# Patient Record
Sex: MALE | Race: BLACK OR AFRICAN AMERICAN | NOT HISPANIC OR LATINO | Employment: OTHER | URBAN - METROPOLITAN AREA
[De-identification: names, ages, dates, MRNs, and addresses within clinical notes are randomized per-mention and may not be internally consistent; named-entity substitution may affect disease eponyms.]

---

## 2017-01-17 ENCOUNTER — CLINICAL SUPPORT (OUTPATIENT)
Dept: CARDIOLOGY | Facility: CLINIC | Age: 47
End: 2017-01-17
Payer: COMMERCIAL

## 2017-01-17 ENCOUNTER — OFFICE VISIT (OUTPATIENT)
Dept: CARDIOLOGY | Facility: CLINIC | Age: 47
End: 2017-01-17
Payer: COMMERCIAL

## 2017-01-17 VITALS
SYSTOLIC BLOOD PRESSURE: 120 MMHG | BODY MASS INDEX: 32.6 KG/M2 | DIASTOLIC BLOOD PRESSURE: 82 MMHG | HEIGHT: 73 IN | WEIGHT: 246 LBS | HEART RATE: 71 BPM

## 2017-01-17 DIAGNOSIS — E78.5 OTHER AND UNSPECIFIED HYPERLIPIDEMIA: ICD-10-CM

## 2017-01-17 DIAGNOSIS — E78.2 MIXED HYPERLIPIDEMIA: ICD-10-CM

## 2017-01-17 DIAGNOSIS — R07.89 CHEST PAIN, ATYPICAL: Primary | ICD-10-CM

## 2017-01-17 DIAGNOSIS — Z82.49 FAMILY HISTORY OF HEART DISEASE IN MALE FAMILY MEMBER BEFORE AGE 55: ICD-10-CM

## 2017-01-17 PROCEDURE — 99204 OFFICE O/P NEW MOD 45 MIN: CPT | Mod: S$GLB,,, | Performed by: INTERNAL MEDICINE

## 2017-01-17 PROCEDURE — 93000 ELECTROCARDIOGRAM COMPLETE: CPT | Mod: S$GLB,,, | Performed by: INTERNAL MEDICINE

## 2017-01-17 PROCEDURE — 99999 PR PBB SHADOW E&M-EST. PATIENT-LVL III: CPT | Mod: PBBFAC,,, | Performed by: INTERNAL MEDICINE

## 2017-01-17 PROCEDURE — 1159F MED LIST DOCD IN RCRD: CPT | Mod: S$GLB,,, | Performed by: INTERNAL MEDICINE

## 2017-01-17 RX ORDER — GABAPENTIN 300 MG/1
300 CAPSULE ORAL 3 TIMES DAILY
COMMUNITY
End: 2021-11-05

## 2017-01-17 RX ORDER — NABUMETONE 500 MG/1
500 TABLET, FILM COATED ORAL 2 TIMES DAILY
COMMUNITY
End: 2017-10-02

## 2017-01-17 RX ORDER — ROSUVASTATIN CALCIUM 10 MG/1
10 TABLET, COATED ORAL DAILY
COMMUNITY

## 2017-01-17 NOTE — MR AVS SNAPSHOT
[facility] Cardiology  [address] [place] [address]  Phone: [phone]  Fax: [phone]                  Jhony Matute   2017 3:40 PM   Office Visit    Description:  Male : 1970   Provider:  Melvin Javier MD   Department:  [facility] - Cardiology           Reason for Visit     Hyperlipidemia           Diagnoses this Visit        Comments    Chest pain, atypical         Mixed hyperlipidemia         Family history of heart disease in male family member before age 55                To Do List           Future Appointments        Provider Department Dept Phone    2017 3:40 PM Melvin Javier MD [facility] Cardiology [phone]    2017 8:00 AM CARDIOVASCULAR, [facility] [phone]      Goals (5 Years of Data)     None      Follow-Up and Disposition     Return if symptoms worsen or fail to improve.    Follow-up and Disposition History      [facility]     [facility] Care [facility] - / Assistance  Registered nurses in the [facility] provide clinical advisement, health education, appointment booking, and other advisory services.  Call for this free service at [phone].             Medications           Message regarding Medications     Verify the changes and/or additions to your medication regime listed below are the same as discussed with your clinician today.  If any of these changes or additions are incorrect, please notify your healthcare provider.             Verify that the below list of medications is an accurate representation of the medications you are currently taking.  If none reported, the list may be blank. If incorrect, please contact your healthcare provider. Carry this list with you in case of emergency.           Current Medications     gabapentin (NEURONTIN) 300 MG capsule Take 300 mg by mouth 3 (three) times daily.    hydrocodone-acetaminophen  (LORTAB)  mg per tablet Take 1 tablet by mouth every 6 (six) hours as needed  "for Pain.    nabumetone (RELAFEN) 500 MG tablet Take 500 mg by mouth 2 (two) times daily.    rosuvastatin (CRESTOR) 10 MG tablet Take 10 mg by mouth once daily.           Clinical Reference Information           Vital Signs - Last Recorded  Most recent update: 1/17/2017  1:50 PM by Shirley Guajardo MA    BP Pulse Ht Wt BMI    120/82 (BP Location: Left arm) 71 6' 1" (1.854 m) 111.6 kg (246 lb) 32.46 kg/m2      Blood Pressure          Most Recent Value    BP  120/82      Allergies as of 1/17/2017     No Known Allergies      Immunizations Administered on Date of Encounter - 1/17/2017     None      Orders Placed During Today's Visit     Future Labs/Procedures Expected by Expires    Exercise stress echo with color flow  As directed 1/17/2018      [facility] Sign-Up     Activating your [facility] account is as easy as 1-2-3!     1) Visit [email or web address], select Sign Up Now, enter this activation code and your date of birth, then select Next.  [email or web address]  Expires: 3/3/2017  2:23 PM      2) Create a username and password to use when you visit [facility] in the future and select a security question in case you lose your password and select Next.    3) Enter your e-mail address and click Sign Up!    Additional Information  If you have questions, please e-mail [email or web address] or call [phone] to talk to our [facility] staff. Remember, [facility] is NOT to be used for urgent needs. For medical emergencies, dial 911.         "

## 2017-01-17 NOTE — PROGRESS NOTES
Subjective:   Patient ID:  Jhony Matute is a 46 y.o. male who presents for evaluation of Hyperlipidemia (hasn't taken in2 or 3 months)      HPI Comments: 47 yo, male, local [employer]. PMH HLD and atypical chest pain.  5 years ago, woke up at night due to sharp chest pain and dyspnea. Then went to [facility]. Sate in the hospital for few days and was told to f/[facility] cardiologist for ? Pericarditis. Pt did not f/u with cardiologist. For years, had recurrent less severe chest pain, lasted for few seconds and resolved after resting. Most time, occurred when doing nothing. Few times a years and no change recently. Last episode was 4 months ago.  No smoking/drinking.  Father had 1st heart attack at early 50's, mother had stroke at 80's. Sister had open heart surgery at 50's.  EKG NSR,  ms      Past Medical History   Diagnosis Date    Hypertension        History reviewed. No pertinent past surgical history.    Social History   Substance Use Topics    Smoking status: Never Smoker    Smokeless tobacco: None    Alcohol use No      Comment: socially       Family History   Problem Relation Age of Onset    Stroke Mother     Stroke Father     Heart attack Father        Review of Systems   Constitution: Negative for decreased appetite, diaphoresis, fever, weakness, malaise/fatigue and night sweats.   HENT: Negative for headaches and nosebleeds.    Eyes: Negative for blurred vision and double vision.   Cardiovascular: Negative for chest pain, claudication, dyspnea on exertion, irregular heartbeat, leg swelling, near-syncope, orthopnea, palpitations, paroxysmal nocturnal dyspnea and syncope.   Respiratory: Negative for cough, shortness of breath, sleep disturbances due to breathing, snoring, sputum production and wheezing.    Endocrine: Negative for cold intolerance and polyuria.   Hematologic/Lymphatic: Does not bruise/bleed easily.   Skin: Negative for rash.   Musculoskeletal: Negative for  back pain, falls, joint pain, joint swelling and neck pain.   Gastrointestinal: Negative for abdominal pain, heartburn, nausea and vomiting.   Genitourinary: Negative for dysuria, frequency and hematuria.   Neurological: Negative for difficulty with concentration, dizziness, focal weakness, light-headedness, numbness and seizures.   Psychiatric/Behavioral: Negative for depression, memory loss and substance abuse. The patient does not have insomnia.    Allergic/Immunologic: Negative for HIV exposure and hives.       Objective:   Physical Exam   Constitutional: He is oriented to person, place, and time. He appears well-nourished.   HENT:   Head: Normocephalic.   Eyes: Pupils are equal, round, and reactive to light.   Neck: Normal carotid pulses and no JVD present. Carotid bruit is not present. No thyromegaly present.   Cardiovascular: Normal rate, regular rhythm, normal heart sounds and normal pulses.   No extrasystoles are present. PMI is not displaced.  Exam reveals no gallop and no S3.    No murmur heard.  Pulmonary/Chest: Breath sounds normal. No stridor. No respiratory distress.   Abdominal: Soft. Bowel sounds are normal. There is no tenderness. There is no rebound.   Musculoskeletal: Normal range of motion.   Neurological: He is alert and oriented to person, place, and time.   Skin: Skin is intact. No rash noted.   Psychiatric: His behavior is normal.       No results found for: CHOL  No results found for: HDL  No results found for: LDLCALC  No results found for: TRIG  No results found for: CHOLHDL    Chemistry    No results found for: NA, K, CL, CO2, BUN, CREATININE, GLU No results found for: CALCIUM, ALKPHOS, AST, ALT, BILITOT       No results found for: TSH  No results found for: INR, PROTIME  No results found for: WBC, HGB, HCT, MCV, PLT  BNP  @LABRCNTIP(BNP,BNPTRIAGEBLO)@  CrCl cannot be calculated (Patient has no serum creatinine result on file.).     Assessment:      1. Chest pain, atypical    2. Mixed  hyperlipidemia    3. Family history of heart disease in male family member before age 55        Plan:   Exercise stress test ordered  Will call for the result.  If negative, f/u with PCP.

## 2017-10-02 ENCOUNTER — OFFICE VISIT (OUTPATIENT)
Dept: INTERNAL MEDICINE | Facility: CLINIC | Age: 47
End: 2017-10-02
Payer: COMMERCIAL

## 2017-10-02 ENCOUNTER — HOSPITAL ENCOUNTER (OUTPATIENT)
Dept: RADIOLOGY | Facility: HOSPITAL | Age: 47
Discharge: HOME | End: 2017-10-02
Attending: NURSE PRACTITIONER
Payer: COMMERCIAL

## 2017-10-02 VITALS
WEIGHT: 245.38 LBS | DIASTOLIC BLOOD PRESSURE: 78 MMHG | BODY MASS INDEX: 32.52 KG/M2 | TEMPERATURE: 97 F | HEART RATE: 81 BPM | SYSTOLIC BLOOD PRESSURE: 128 MMHG | HEIGHT: 73 IN

## 2017-10-02 DIAGNOSIS — M79.671 FOOT PAIN, RIGHT: Primary | ICD-10-CM

## 2017-10-02 DIAGNOSIS — M79.671 FOOT PAIN, RIGHT: ICD-10-CM

## 2017-10-02 DIAGNOSIS — M10.071 ACUTE IDIOPATHIC GOUT OF RIGHT FOOT: Primary | ICD-10-CM

## 2017-10-02 PROCEDURE — 99204 OFFICE O/P NEW MOD 45 MIN: CPT | Mod: 25,S$GLB,, | Performed by: NURSE PRACTITIONER

## 2017-10-02 PROCEDURE — 99999 PR PBB SHADOW E&M-EST. PATIENT-LVL III: CPT | Mod: PBBFAC,,, | Performed by: NURSE PRACTITIONER

## 2017-10-02 PROCEDURE — 73630 X-RAY EXAM OF FOOT: CPT | Mod: TC,PO,RT

## 2017-10-02 PROCEDURE — 73630 X-RAY EXAM OF FOOT: CPT | Mod: 26,RT,, | Performed by: RADIOLOGY

## 2017-10-02 PROCEDURE — 96372 THER/PROPH/DIAG INJ SC/IM: CPT | Mod: S$GLB,,, | Performed by: NURSE PRACTITIONER

## 2017-10-02 RX ORDER — COLCHICINE 0.6 MG/1
0.6 TABLET ORAL 2 TIMES DAILY
Qty: 10 TABLET | Refills: 0 | Status: SHIPPED | OUTPATIENT
Start: 2017-10-02 | End: 2018-01-15

## 2017-10-02 RX ORDER — NAPROXEN 500 MG/1
500 TABLET ORAL 2 TIMES DAILY WITH MEALS
Qty: 20 TABLET | Refills: 0 | Status: SHIPPED | OUTPATIENT
Start: 2017-10-02 | End: 2017-10-13

## 2017-10-02 RX ORDER — KETOROLAC TROMETHAMINE 30 MG/ML
60 INJECTION, SOLUTION INTRAMUSCULAR; INTRAVENOUS ONCE
Status: COMPLETED | OUTPATIENT
Start: 2017-10-02 | End: 2017-10-02

## 2017-10-02 RX ADMIN — KETOROLAC TROMETHAMINE 60 MG: 30 INJECTION, SOLUTION INTRAMUSCULAR; INTRAVENOUS at 02:10
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
## 2017-10-02 NOTE — PATIENT INSTRUCTIONS
Ice/warm compresses to affected area   Warm epsom salt soaks to affected area  Start naproxen tomorrow   Will call with xray results and uric acid level

## 2017-10-13 ENCOUNTER — OFFICE VISIT (OUTPATIENT)
Dept: INTERNAL MEDICINE | Facility: CLINIC | Age: 47
End: 2017-10-13
Payer: COMMERCIAL

## 2017-10-13 VITALS
HEIGHT: 73 IN | OXYGEN SATURATION: 96 % | WEIGHT: 244.25 LBS | BODY MASS INDEX: 32.37 KG/M2 | TEMPERATURE: 98 F | DIASTOLIC BLOOD PRESSURE: 76 MMHG | HEART RATE: 90 BPM | SYSTOLIC BLOOD PRESSURE: 122 MMHG

## 2017-10-13 DIAGNOSIS — M10.9 ACUTE GOUT OF RIGHT FOOT, UNSPECIFIED CAUSE: Primary | ICD-10-CM

## 2017-10-13 DIAGNOSIS — G47.26 SHIFT WORK SLEEP DISORDER: Chronic | ICD-10-CM

## 2017-10-13 DIAGNOSIS — Z11.4 SCREENING FOR HIV WITHOUT PRESENCE OF RISK FACTORS: ICD-10-CM

## 2017-10-13 DIAGNOSIS — E66.9 CLASS 1 OBESITY: Chronic | ICD-10-CM

## 2017-10-13 DIAGNOSIS — Z79.899 ENCOUNTER FOR LONG-TERM (CURRENT) USE OF MEDICATIONS: Chronic | ICD-10-CM

## 2017-10-13 DIAGNOSIS — E78.2 MIXED HYPERLIPIDEMIA: ICD-10-CM

## 2017-10-13 DIAGNOSIS — G47.10 HYPERSOMNIA: Chronic | ICD-10-CM

## 2017-10-13 DIAGNOSIS — R53.83 FATIGUE, UNSPECIFIED TYPE: ICD-10-CM

## 2017-10-13 PROBLEM — E66.811 CLASS 1 OBESITY: Chronic | Status: ACTIVE | Noted: 2017-10-13

## 2017-10-13 PROCEDURE — 99999 PR PBB SHADOW E&M-EST. PATIENT-LVL III: CPT | Mod: PBBFAC,,, | Performed by: FAMILY MEDICINE

## 2017-10-13 PROCEDURE — 99204 OFFICE O/P NEW MOD 45 MIN: CPT | Mod: S$GLB,,, | Performed by: FAMILY MEDICINE

## 2017-10-13 RX ORDER — DEXTROAMPHETAMINE SACCHARATE, AMPHETAMINE ASPARTATE, DEXTROAMPHETAMINE SULFATE AND AMPHETAMINE SULFATE 2.5; 2.5; 2.5; 2.5 MG/1; MG/1; MG/1; MG/1
10 TABLET ORAL 2 TIMES DAILY PRN
COMMUNITY
Start: 2017-09-16 | End: 2018-01-15

## 2017-10-13 RX ORDER — ALLOPURINOL 100 MG/1
100 TABLET ORAL DAILY
Qty: 30 TABLET | Refills: 1 | Status: SHIPPED | OUTPATIENT
Start: 2017-10-13 | End: 2018-01-15

## 2017-10-13 NOTE — ASSESSMENT & PLAN NOTE
STOP-BANG questionnaire to assess risk for obstructive sleep apnea (YANI)  · Snoring: Do you snore loudly? ANSWER: YES  · Tired: Do you often feel tired, fatigued, or sleepy during daytime? ANSWER: YES ([place] Sleepiness Scale score = 14.)  · Observed: Has anyone observed you stop breathing during your sleep? ANSWER: YES  · Pressure: Do you have or are you being treated for high blood pressure? ANSWER: NO  · BMI: BMI more than 35 kg/m2? ANSWER: NO (BMI = Body mass index is 32.23 kg/m².)   · Age: Age over 50 yr old? ANSWER: NO  · Neck circumference: Neck circumference greater than 40 cm? ANSWER: YES (Neck circumference = 43.5 cm, Mallampati class 3 oropharynx.  · Gender: Gender male? ANSWER: YES (Gender = male)    STOP-BANG Score = 6 (HIGH risk of YANI)    ADDITIONAL RELEVANT HISTORY: reports non-restorative sleep, reports waking up gasping/choking and reports involuntary weight-gain

## 2017-10-17 NOTE — PROGRESS NOTES
HEALTH MAINTENANCE REVIEW  Health Maintenance   Topic Date Due    TETANUS VACCINE  11/27/1988    Lipid Panel  08/19/2021    Influenza Vaccine  Completed        HEALTH MAINTENANCE INTERVENTIONS - DUE OR DUE SOON  Health Maintenance Due   Topic Date Due    TETANUS VACCINE  11/27/1988       FUTURE APPOINTMENTS  No future appointments.    CHIEF COMPLAINT  Foot Pain      HISTORY OF PRESENT ILLNESS  PROBLEM/CONDITION: No problem is acute inflammatory arthropathy of right midfoot, evaluated by urgent care, treated empirically with colchicine with greater than 90% improvement, evaluated with uric acid level elevated, and all history and symptoms consistent with acute gout, especially given his family history of his father having SEVERE gout. He reports no recent relevant trauma. We discussed risks and benefits of treatment options. In addition to appropriate therapeutic lifestyle changes, we are going to initiate allopurinol and recheck his uric acid level in about one month and titrate as clinically indicated.    PROBLEM/CONDITION: Appears to be tolerating statin for dyslipidemia without apparent myositis or hepatic dysfunction.    PROBLEM/CONDITION: MILD obesity noted with MILD to MODERATE involuntary weight gain over the last year. We discussed contributing factors.    PROBLEM/CONDITION: He reports chronic fatigue, hypersomnia, and difficulty focusing and maintaining attention. He has never been properly evaluated for attention deficit disorder, but his previous primary care physician treated empirically with Adderall to address his shiftwork sleep disorder. He reports fair but suboptimal results. We discussed differential diagnosis. He has multiple anatomic and historical risk factors for obstructive sleep apnea, as noted. He agreed to obtain tests as ordered in return soon thereafter to review test results and discuss treatment plan.    No other complaints or concerns reported.    Problem List Items Addressed  This Visit     Mixed hyperlipidemia    Relevant Orders    TSH    Comprehensive metabolic panel    Lipid panel    Acute gout of right foot - Primary    Relevant Medications    allopurinol (ZYLOPRIM) 100 MG tablet    Other Relevant Orders    Uric acid    Fatigue    Relevant Orders    Home Sleep Studies    TSH    CBC auto differential    Comprehensive metabolic panel    Encounter for long-term (current) use of medications (Chronic)    Shift work sleep disorder (Chronic)    Class 1 obesity (Chronic)    Relevant Orders    TSH    Hypersomnia (Chronic)    Current Assessment & Plan     STOP-BANG questionnaire to assess risk for obstructive sleep apnea (YANI)  · Snoring: Do you snore loudly? ANSWER: YES  · Tired: Do you often feel tired, fatigued, or sleepy during daytime? ANSWER: YES ([place] Sleepiness Scale score = 14.)  · Observed: Has anyone observed you stop breathing during your sleep? ANSWER: YES  · Pressure: Do you have or are you being treated for high blood pressure? ANSWER: NO  · BMI: BMI more than 35 kg/m2? ANSWER: NO (BMI = Body mass index is 32.23 kg/m².)   · Age: Age over 50 yr old? ANSWER: NO  · Neck circumference: Neck circumference greater than 40 cm? ANSWER: YES (Neck circumference = 43.5 cm, Mallampati class 3 oropharynx.  · Gender: Gender male? ANSWER: YES (Gender = male)    STOP-BANG Score = 6 (HIGH risk of YANI)    ADDITIONAL RELEVANT HISTORY: reports non-restorative sleep, reports waking up gasping/choking and reports involuntary weight-gain           Relevant Orders    Home Sleep Studies      Other Visit Diagnoses     Screening for HIV without presence of risk factors        Relevant Orders    HIV-1 and HIV-2 antibodies          REVIEW OF SYSTEMS  CONSTITUTIONAL: No fever reported.  CARDIOVASCULAR: No chest pain reported.  PULMONARY: No trouble breathing reported.     PHYSICAL EXAM  Vitals:    10/13/17 1549   BP: 122/76   BP Location: Right arm   Patient Position: Sitting   BP Method: Large  "(Manual)   Pulse: 90   Temp: 97.9 °F (36.6 °C)   TempSrc: Tympanic   SpO2: 96%   Weight: 110.8 kg (244 lb 4.3 oz)   Height: 6' 1" (1.854 m)   CONSTITUTIONAL: Vital signs noted. No apparent distress. Does not appear acutely ill or septic. Appears adequately hydrated.  HEENT: External ENT grossly unremarkable. Hearing grossly intact. Oropharynx moist.  PULM: Lungs clear. Breathing unlabored.  HEART: Auscultation reveals regular rate and rhythm without murmur, gallop or rub.  DERM: Skin warm and moist with normal turgor.  NEURO: There are no gross focal motor deficits or gross deficits of cranial nerves III-XII.  PSYCHIATRIC: Alert and oriented x 3. Mood is grossly neutral. Affect appropriate. Judgment and insight not grossly compromised.  MUSCULOSKELETAL: Grossly normal stance and gait. Feet are unremarkable to inspection, palpation, and ROM testing.    PAST MEDICAL HISTORY, FAMILY HISTORY, SOCIAL HISTORY, CURRENT MEDICATION LIST, and ALLERGY LIST reviewed by me (IQRA Gordon MD) and are updated consistent with the patient's report.    ASSESSMENT and PLAN  Acute gout of right foot, unspecified cause  -     allopurinol (ZYLOPRIM) 100 MG tablet; Take 1 tablet (100 mg total) by mouth once daily. - for gout prevention  Dispense: 30 tablet; Refill: 1  -     Uric acid; Future; Expected date: 11/03/2017    Encounter for long-term (current) use of medications    Shift work sleep disorder    Class 1 obesity  -     TSH; Future; Expected date: 11/03/2017    Fatigue, unspecified type  -     Home Sleep Studies; Future  -     TSH; Future; Expected date: 11/03/2017  -     CBC auto differential; Future; Expected date: 11/03/2017  -     Comprehensive metabolic panel; Future; Expected date: 11/03/2017    Hypersomnia  -     Home Sleep Studies; Future    Screening for HIV without presence of risk factors  -     HIV-1 and HIV-2 antibodies; Future; Expected date: 11/03/2017    Mixed hyperlipidemia  -     TSH; Future; Expected " "date: 11/03/2017  -     Comprehensive metabolic panel; Future; Expected date: 11/03/2017  -     Lipid panel; Future; Expected date: 11/03/2017        Medication List with Changes/Refills   New Medications    ALLOPURINOL (ZYLOPRIM) 100 MG TABLET    Take 1 tablet (100 mg total) by mouth once daily. - for gout prevention   Current Medications    COLCHICINE 0.6 MG TABLET    Take 1 tablet (0.6 mg total) by mouth 2 (two) times daily.    DEXTROAMPHETAMINE-AMPHETAMINE 10 MG TAB    Take 10 mg by mouth 2 (two) times daily as needed.    GABAPENTIN (NEURONTIN) 300 MG CAPSULE    Take 300 mg by mouth 3 (three) times daily.    ROSUVASTATIN (CRESTOR) 10 MG TABLET    Take 10 mg by mouth once daily.       Return in about 4 weeks (around 11/10/2017) for review test results and discuss treatment plan, re-evaluate problem(s) discussed today.    ABOUT THIS DOCUMENTATION:  · The order of the conditions listed in the HPI is one of convenience and does not necessarily reflect the chronology of the appointment, nor the relative importance of a condition. It is possible that additional description or status details about condition(s) may be found elsewhere in the documentation for today's encounter.  · Documentation entered by me for this encounter was done in part using speech-recognition technology. Although I have made an effort to ensure accuracy, "sound like" errors may exist and should be interpreted in context.                        -IQRA Gordon MD    There are no Patient Instructions on file for this visit.    "

## 2017-11-06 ENCOUNTER — TELEPHONE (OUTPATIENT)
Dept: PULMONOLOGY | Facility: HOSPITAL | Age: 47
End: 2017-11-06

## 2017-12-08 ENCOUNTER — OFFICE VISIT (OUTPATIENT)
Dept: SLEEP MEDICINE | Facility: CLINIC | Age: 47
End: 2017-12-08
Payer: COMMERCIAL

## 2017-12-08 DIAGNOSIS — G47.33 OSA (OBSTRUCTIVE SLEEP APNEA): ICD-10-CM

## 2017-12-08 DIAGNOSIS — G47.10 HYPERSOMNIA: Chronic | ICD-10-CM

## 2017-12-08 PROCEDURE — 99999 PR PBB SHADOW E&M-EST. PATIENT-LVL II: CPT | Mod: PBBFAC,,,

## 2017-12-08 PROCEDURE — 95806 SLEEP STUDY UNATT&RESP EFFT: CPT | Mod: S$GLB,,, | Performed by: INTERNAL MEDICINE

## 2017-12-08 PROCEDURE — 99499 UNLISTED E&M SERVICE: CPT | Mod: S$GLB,,, | Performed by: INTERNAL MEDICINE

## 2017-12-08 NOTE — Clinical Note
Assessment and Recommendations Device collected data for 7 hours 13 minutes. Excluded respiratory signal was 20 minutes. Lowest oxygen saturation was 82%. Average heart rate was 66 bpm with a maximal heart rate of 94 bpm. Snoring was recorded above 50 dB 100% of the time. Apnea hypopnea index: 15 events per hour. Total events 109. Obstructive sleep apnea-hypopnea syndrome detected. Treatment recommendations: CPAP would be the guideline recommendation for first-line treatment for obstructive sleep apnea. Either order in lab CPAP titration or AutoPAP device. Follow-up evaluation and treatment in the Ambulatory sleep disorder clinic. Other modalities for treatment of obstructive sleep apnea may be explored in patients with intolerant to CPAP including evaluation by ear nose and throat, mandibular advancement device, nonsupine sleep positioning device. Significant weight loss is recommended to normal ranges. Close follow-up to ensure resolution of symptoms.

## 2017-12-21 ENCOUNTER — TELEPHONE (OUTPATIENT)
Dept: PULMONOLOGY | Facility: CLINIC | Age: 47
End: 2017-12-21

## 2017-12-29 ENCOUNTER — TELEPHONE (OUTPATIENT)
Dept: PULMONOLOGY | Facility: CLINIC | Age: 47
End: 2017-12-29

## 2017-12-29 NOTE — TELEPHONE ENCOUNTER
Spoke to patient appointment offered for today and 12/ 30/17 patient states that he will keep scheduled appointment

## 2017-12-29 NOTE — TELEPHONE ENCOUNTER
----- Message from Samantha Del Toro sent at 12/29/2017  9:19 AM CST -----  Contact: Emma/wife  Emma called and stated the patient came in on 12/20/17 to see Mrs. Lejeune; he stated he arrived at 1:40 pm and sat there until 4:00 pm and no one ever called him to the back. He is trying to get in while he on vacation from work and would like to be worked in today. She can be contacted at [phone] or [phone] home.     Thanks,  Samantha

## 2018-01-15 ENCOUNTER — OFFICE VISIT (OUTPATIENT)
Dept: INTERNAL MEDICINE | Facility: CLINIC | Age: 48
End: 2018-01-15
Payer: COMMERCIAL

## 2018-01-15 ENCOUNTER — HOSPITAL ENCOUNTER (OUTPATIENT)
Dept: RADIOLOGY | Facility: HOSPITAL | Age: 48
Discharge: HOME | End: 2018-01-15
Attending: NURSE PRACTITIONER
Payer: COMMERCIAL

## 2018-01-15 ENCOUNTER — OFFICE VISIT (OUTPATIENT)
Dept: CARDIOLOGY | Facility: CLINIC | Age: 48
End: 2018-01-15
Payer: COMMERCIAL

## 2018-01-15 ENCOUNTER — TELEPHONE (OUTPATIENT)
Dept: CARDIOLOGY | Facility: CLINIC | Age: 48
End: 2018-01-15

## 2018-01-15 VITALS
HEART RATE: 84 BPM | DIASTOLIC BLOOD PRESSURE: 80 MMHG | HEIGHT: 73 IN | SYSTOLIC BLOOD PRESSURE: 132 MMHG | WEIGHT: 240 LBS | BODY MASS INDEX: 31.81 KG/M2

## 2018-01-15 VITALS
BODY MASS INDEX: 32.49 KG/M2 | HEIGHT: 73 IN | WEIGHT: 245.13 LBS | TEMPERATURE: 97 F | DIASTOLIC BLOOD PRESSURE: 80 MMHG | HEART RATE: 113 BPM | SYSTOLIC BLOOD PRESSURE: 130 MMHG

## 2018-01-15 DIAGNOSIS — M54.2 NECK PAIN: ICD-10-CM

## 2018-01-15 DIAGNOSIS — R07.89 ATYPICAL CHEST PAIN: Primary | ICD-10-CM

## 2018-01-15 DIAGNOSIS — Z82.49 FAMILY HISTORY OF HEART DISEASE IN MALE FAMILY MEMBER BEFORE AGE 55: ICD-10-CM

## 2018-01-15 DIAGNOSIS — M62.838 MUSCLE SPASMS OF NECK: Primary | ICD-10-CM

## 2018-01-15 DIAGNOSIS — E78.2 MIXED HYPERLIPIDEMIA: ICD-10-CM

## 2018-01-15 DIAGNOSIS — M62.838 MUSCLE SPASMS OF NECK: ICD-10-CM

## 2018-01-15 PROCEDURE — 72040 X-RAY EXAM NECK SPINE 2-3 VW: CPT | Mod: 26,,, | Performed by: RADIOLOGY

## 2018-01-15 PROCEDURE — 96372 THER/PROPH/DIAG INJ SC/IM: CPT | Mod: S$GLB,,, | Performed by: FAMILY MEDICINE

## 2018-01-15 PROCEDURE — 99999 PR PBB SHADOW E&M-EST. PATIENT-LVL III: CPT | Mod: PBBFAC,,, | Performed by: INTERNAL MEDICINE

## 2018-01-15 PROCEDURE — 99214 OFFICE O/P EST MOD 30 MIN: CPT | Mod: S$GLB,,, | Performed by: INTERNAL MEDICINE

## 2018-01-15 PROCEDURE — 93000 ELECTROCARDIOGRAM COMPLETE: CPT | Mod: S$GLB,,, | Performed by: NUCLEAR MEDICINE

## 2018-01-15 PROCEDURE — 99999 PR PBB SHADOW E&M-EST. PATIENT-LVL IV: CPT | Mod: PBBFAC,,, | Performed by: NURSE PRACTITIONER

## 2018-01-15 PROCEDURE — 72040 X-RAY EXAM NECK SPINE 2-3 VW: CPT | Mod: TC,FY,PO

## 2018-01-15 PROCEDURE — 99214 OFFICE O/P EST MOD 30 MIN: CPT | Mod: 25,S$GLB,, | Performed by: NURSE PRACTITIONER

## 2018-01-15 RX ORDER — CYCLOBENZAPRINE HCL 10 MG
10 TABLET ORAL NIGHTLY PRN
Qty: 14 TABLET | Refills: 0 | Status: SHIPPED | OUTPATIENT
Start: 2018-01-15 | End: 2018-01-25

## 2018-01-15 RX ORDER — NABUMETONE 500 MG/1
500 TABLET, FILM COATED ORAL 2 TIMES DAILY
COMMUNITY
End: 2022-04-18

## 2018-01-15 RX ORDER — METHYLPREDNISOLONE ACETATE 40 MG/ML
60 INJECTION, SUSPENSION INTRA-ARTICULAR; INTRALESIONAL; INTRAMUSCULAR; SOFT TISSUE
Status: COMPLETED | OUTPATIENT
Start: 2018-01-15 | End: 2018-01-15

## 2018-01-15 RX ADMIN — METHYLPREDNISOLONE ACETATE 60 MG: 40 INJECTION, SUSPENSION INTRA-ARTICULAR; INTRALESIONAL; INTRAMUSCULAR; SOFT TISSUE at 03:01

## 2018-01-15 NOTE — PROGRESS NOTES
Subjective:       Patient ID: Jhony Matute is a 47 y.o. male.    Chief Complaint: Back Pain (upper back) and Neck Pain    Neck pain x 1 week- believes he has slept wrong. He reports stiffness and limited ROM. Left side of neck is mostly affected. Radiates across shoulders and between shoulder blades. There is also intermittent moderate headache. No fever, blurred vision, dizziness, n/v. Pt was just seen by cardiology due to cp due to rotation of neck. Cardiac workup underway.       Review of Systems   Constitutional: Negative for chills, fatigue and fever.   HENT: Negative for congestion, ear pain, postnasal drip, rhinorrhea, sinus pressure, sneezing and sore throat.    Eyes: Negative for photophobia, pain and visual disturbance.   Respiratory: Negative for cough, chest tightness and shortness of breath.    Cardiovascular: Negative for chest pain, palpitations and leg swelling.   Gastrointestinal: Negative for abdominal pain, constipation, diarrhea, nausea and vomiting.   Genitourinary: Negative for dysuria and frequency.   Musculoskeletal: Positive for neck pain and neck stiffness. Negative for arthralgias.   Neurological: Negative for dizziness, light-headedness and headaches.   Psychiatric/Behavioral: Negative for sleep disturbance.       Objective:      Physical Exam   Constitutional: He is oriented to person, place, and time. He appears well-developed and well-nourished.   HENT:   Head: Normocephalic and atraumatic.   Right Ear: Tympanic membrane normal.   Left Ear: Tympanic membrane normal.   Eyes: Conjunctivae and EOM are normal. Pupils are equal, round, and reactive to light.   Neck: Neck supple. Muscular tenderness present. No spinous process tenderness present. No neck rigidity. Decreased range of motion present. No edema and no erythema present.   Cardiovascular: Normal rate, regular rhythm and normal heart sounds.    Pulmonary/Chest: Effort normal and breath sounds normal.   Abdominal: Soft. Bowel  sounds are normal.   Neurological: He is alert and oriented to person, place, and time.   Skin: Skin is warm and dry.   Psychiatric: He has a normal mood and affect.       Assessment:       1. Muscle spasms of neck        Plan:   Muscle spasms of neck  -     X-Ray Cervical Spine AP And Lateral; Future; Expected date: 01/15/2018  -     cyclobenzaprine (FLEXERIL) 10 MG tablet; Take 1 tablet (10 mg total) by mouth nightly as needed for Muscle spasms.  Dispense: 14 tablet; Refill: 0  -     methylPREDNISolone acetate injection 60 mg; Inject 1.5 mLs (60 mg total) into the muscle one time.      As above   Continue hot soaks/hot showers  Muscle rub/ neck and shoulder stretches  Steroid shot today. Restart Relafen tomorrow only if pain persists.  Muscle relaxant in evening as needed. Caution for sedation as discussed.  Will call with neck xray result.

## 2018-01-15 NOTE — PATIENT INSTRUCTIONS
Continue hot soaks/hot showers  Muscle rub/ neck and shoulder stretches  Steroid shot today. Restart Relafen tomorrow only if pain persists.  Muscle relaxant in evening as needed. Caution for sedation as discussed.  Will call with neck xray result.

## 2018-01-15 NOTE — PROGRESS NOTES
"Subjective:    Patient ID:  Jhony Matute is a 47 y.o. male who presents for evaluation of Chest Pain and Shortness of Breath      HPI  Pt presents for f/u.  This is my first clinic visit with pt.  He saw Dr. Javier one year ago for atypical cp after hospital eval at [facility], possible pericarditis.  He has family h/o CAD (father MI, mother stroke, sister CABG).  Stress echo was ordered by Dr. Javier a year ago but pt cancelled his test.   Now here.   States he has pain in back and chest over 1.5 week  Worse if he relaxes. Mineral bath helped.  Worse with positional movements.  If he moves a certain way, sxs get worse.   No typical anginal sxs.  No cp with exertion.    Some dyspnea, on chronic basis, some with exertion.  ecg today is normal other than possible LAE.   Had some cold and/or flu 2 weeks ago at work, no seems back to normal on this end.      Review of Systems   Constitution: Positive for malaise/fatigue.   HENT: Negative.    Eyes: Negative.    Cardiovascular: Positive for chest pain and dyspnea on exertion.   Respiratory: Positive for shortness of breath.    Endocrine: Negative.    Hematologic/Lymphatic: Negative.    Skin: Negative.    Musculoskeletal: Positive for arthritis, back pain and neck pain.   Gastrointestinal: Negative.    Genitourinary: Negative.    Neurological: Negative.    Psychiatric/Behavioral: Negative.    Allergic/Immunologic: Negative.        /80 (BP Location: Left arm)   Pulse 84   Ht 6' 1" (1.854 m)   Wt 108.9 kg (240 lb)   BMI 31.66 kg/m²     Wt Readings from Last 3 Encounters:   01/15/18 108.9 kg (240 lb)   10/13/17 110.8 kg (244 lb 4.3 oz)   10/02/17 111.3 kg (245 lb 6 oz)     Temp Readings from Last 3 Encounters:   10/13/17 97.9 °F (36.6 °C) (Tympanic)   10/02/17 96.8 °F (36 °C) (Tympanic)     BP Readings from Last 3 Encounters:   01/15/18 132/80   10/13/17 122/76   10/02/17 128/78     Pulse Readings from Last 3 Encounters:   01/15/18 84   10/13/17 90   10/02/17 81 "          Objective:    Physical Exam   Constitutional: He is oriented to person, place, and time. He appears well-developed and well-nourished.   HENT:   Head: Normocephalic.   Neck: Normal range of motion. Neck supple. Normal carotid pulses, no hepatojugular reflux and no JVD present. Carotid bruit is not present. No thyromegaly present.   Cardiovascular: Normal rate, regular rhythm, S1 normal and S2 normal.  PMI is not displaced.  Exam reveals no S3, no S4, no distant heart sounds, no friction rub, no midsystolic click and no opening snap.    No murmur heard.  Pulses:       Radial pulses are 2+ on the right side, and 2+ on the left side.   Reproducible cp with rotation of neck   Pulmonary/Chest: Effort normal and breath sounds normal. He has no wheezes. He has no rales.   Abdominal: Soft. Bowel sounds are normal. He exhibits no distension, no abdominal bruit, no ascites and no mass. There is no tenderness.   Musculoskeletal: He exhibits no edema.   Neurological: He is alert and oriented to person, place, and time.   Skin: Skin is warm.   Psychiatric: He has a normal mood and affect. His behavior is normal.   Nursing note and vitals reviewed.      I have reviewed all pertinent labs and cardiac studies.      Chemistry    No results found for: NA, K, CL, CO2, BUN, CREATININE, GLU No results found for: CALCIUM, ALKPHOS, AST, ALT, BILITOT, ESTGFRAFRICA, EGFRNONAA     No results found for: WBC, HGB, HCT, MCV, PLT  No results found for: LABA1C, HGBA1C  No results found for: CHOL  No results found for: HDL  No results found for: LDLCALC  No results found for: TRIG  No results found for: CHOLHDL        Assessment:       1. Atypical chest pain    2. Mixed hyperlipidemia    3. Family history of heart disease in male family member before age 55    4. Neck pain         Plan:             - Atypical cp sxs, not typical for cardiac etiology at all but seems MSK or cervical spine related sxs.  - ECG is normal other than possible  LAE.  - At risk for CAD, do advise stress test due to risk factors and h/o atypical cp and this was ordered a year ago but pt never did test.  Will reschedule.  - Echo  - CXR/c spine xray  - Advise pt to go to Urgent Care or PCP today for further eval of neck discomfort.   - Check lipids-- adjust Crestor dose if needed.    Phone review for test results.

## 2018-01-16 ENCOUNTER — PATIENT MESSAGE (OUTPATIENT)
Dept: CARDIOLOGY | Facility: CLINIC | Age: 48
End: 2018-01-16

## 2018-01-16 NOTE — TELEPHONE ENCOUNTER
Please call pt  Cholesterol 197 and triglycerides 259  Cholesterol could be better.  Increase Crestor to 20 mg nightly  Scheduled repeat CMP, FLP 2 months after starting new dose.    Dr Cooper

## 2018-01-18 ENCOUNTER — TELEPHONE (OUTPATIENT)
Dept: CARDIOLOGY | Facility: CLINIC | Age: 48
End: 2018-01-18

## 2018-01-19 NOTE — TELEPHONE ENCOUNTER
We need to call patient about his results, not forward my email about results.  Also need to have labs scheduled as recommended by telephone call.    Thanks    Dr Cooper

## 2018-01-19 NOTE — TELEPHONE ENCOUNTER
Patient was emailed due to unable to reach by phone at that time. It was to inform him until he could be reached by phone or return our call.

## 2018-01-22 ENCOUNTER — TELEPHONE (OUTPATIENT)
Dept: CARDIOLOGY | Facility: HOSPITAL | Age: 48
End: 2018-01-22

## 2018-01-23 NOTE — TELEPHONE ENCOUNTER
Did nursing staff contact pt by phone call and schedule labs and give my recommendations?    Dr Cooper

## 2019-08-23 ENCOUNTER — TELEPHONE (OUTPATIENT)
Dept: ORTHOPEDICS | Facility: CLINIC | Age: 49
End: 2019-08-23

## 2019-08-23 DIAGNOSIS — M51.36 DDD (DEGENERATIVE DISC DISEASE), LUMBAR: Primary | ICD-10-CM

## 2019-08-28 ENCOUNTER — OFFICE VISIT (OUTPATIENT)
Dept: ORTHOPEDICS | Facility: CLINIC | Age: 49
End: 2019-08-28
Payer: COMMERCIAL

## 2019-08-28 ENCOUNTER — HOSPITAL ENCOUNTER (OUTPATIENT)
Dept: RADIOLOGY | Facility: HOSPITAL | Age: 49
Discharge: HOME | End: 2019-08-28
Attending: PHYSICIAN ASSISTANT
Payer: COMMERCIAL

## 2019-08-28 VITALS — HEIGHT: 73 IN | WEIGHT: 241.88 LBS | BODY MASS INDEX: 32.06 KG/M2

## 2019-08-28 DIAGNOSIS — M51.36 DDD (DEGENERATIVE DISC DISEASE), LUMBAR: ICD-10-CM

## 2019-08-28 DIAGNOSIS — M54.16 LUMBAR RADICULOPATHY: Primary | ICD-10-CM

## 2019-08-28 PROCEDURE — 99204 OFFICE O/P NEW MOD 45 MIN: CPT | Mod: S$GLB,,, | Performed by: PHYSICIAN ASSISTANT

## 2019-08-28 PROCEDURE — 72120 X-RAY BEND ONLY L-S SPINE: CPT | Mod: 26,,, | Performed by: RADIOLOGY

## 2019-08-28 PROCEDURE — 99204 PR OFFICE/OUTPT VISIT, NEW, LEVL IV, 45-59 MIN: ICD-10-PCS | Mod: S$GLB,,, | Performed by: PHYSICIAN ASSISTANT

## 2019-08-28 PROCEDURE — 72100 X-RAY EXAM L-S SPINE 2/3 VWS: CPT | Mod: TC

## 2019-08-28 PROCEDURE — 3008F PR BODY MASS INDEX (BMI) DOCUMENTED: ICD-10-PCS | Mod: CPTII,S$GLB,, | Performed by: PHYSICIAN ASSISTANT

## 2019-08-28 PROCEDURE — 99999 PR PBB SHADOW E&M-EST. PATIENT-LVL III: ICD-10-PCS | Mod: PBBFAC,,, | Performed by: PHYSICIAN ASSISTANT

## 2019-08-28 PROCEDURE — 72120 XR LUMBAR SPINE AP AND LAT WITH FLEX/EXT: ICD-10-PCS | Mod: 26,,, | Performed by: RADIOLOGY

## 2019-08-28 PROCEDURE — 72100 X-RAY EXAM L-S SPINE 2/3 VWS: CPT | Mod: 26,,, | Performed by: RADIOLOGY

## 2019-08-28 PROCEDURE — 99999 PR PBB SHADOW E&M-EST. PATIENT-LVL III: CPT | Mod: PBBFAC,,, | Performed by: PHYSICIAN ASSISTANT

## 2019-08-28 PROCEDURE — 3008F BODY MASS INDEX DOCD: CPT | Mod: CPTII,S$GLB,, | Performed by: PHYSICIAN ASSISTANT

## 2019-08-28 PROCEDURE — 72100 XR LUMBAR SPINE AP AND LAT WITH FLEX/EXT: ICD-10-PCS | Mod: 26,,, | Performed by: RADIOLOGY

## 2019-08-28 RX ORDER — OMEGA-3-ACID ETHYL ESTERS 1 G/1
CAPSULE, LIQUID FILLED ORAL
COMMUNITY
Start: 2019-07-23

## 2019-08-28 RX ORDER — HYDROCODONE BITARTRATE AND ACETAMINOPHEN 10; 325 MG/1; MG/1
TABLET ORAL
COMMUNITY
Start: 2019-08-16

## 2019-08-28 RX ORDER — ONDANSETRON 8 MG/1
TABLET, ORALLY DISINTEGRATING ORAL
COMMUNITY
Start: 2019-08-21 | End: 2021-11-05

## 2019-08-28 RX ORDER — CYCLOBENZAPRINE HCL 10 MG
TABLET ORAL
COMMUNITY
Start: 2019-08-19 | End: 2023-03-08 | Stop reason: SDUPTHER

## 2019-08-28 RX ORDER — DEXTROAMPHETAMINE SACCHARATE, AMPHETAMINE ASPARTATE, DEXTROAMPHETAMINE SULFATE AND AMPHETAMINE SULFATE 3.75; 3.75; 3.75; 3.75 MG/1; MG/1; MG/1; MG/1
TABLET ORAL
COMMUNITY
Start: 2019-07-25

## 2019-08-28 NOTE — PROGRESS NOTES
DATE: 8/28/2019  PATIENT: Jhony Matute    Supervising Physician: Franklin Adkins M.D.    CHIEF COMPLAINT: back and right leg pain    HISTORY:  Jhony Matute is a 48 y.o. male here for initial evaluation of low back and right>left leg pain (Back - 7, Leg - 7).  The pain in the back is what bothers him most.  The pain has been present for 4-5 years but has progressively worsened over the last 2 months.  The pain is usually in the right leg but started in the left leg too about 2 months ago.  The patient describes the pain as sharp.  The pain is worse with standing, sitting, squatting and kneeling and improved by lying flat with his feet elevated. There is associated numbness and tingling in the right thigh. There is subjective weakness, particularly with going upstairs. Prior treatments have included gabapentin, norco, flexeril, relafen, physical therapy 3-4 years ago and 3 ESIs over the last 4 years, but no surgery.  His most recent [place] was 7/1/2019 with Dr. allred in [place].  He says it did not give him any relief, no even for a day.     The patient denies myelopathic symptoms such as handwriting changes or difficulty with buttons/coins/keys. Denies perineal paresthesias, bowel/bladder dysfunction.    PAST MEDICAL/SURGICAL HISTORY:  Past Medical History:   Diagnosis Date    Hypertension      History reviewed. No pertinent surgical history.    Medications:   Current Outpatient Medications on File Prior to Visit   Medication Sig Dispense Refill    cyclobenzaprine (FLEXERIL) 10 MG tablet       dextroamphetamine-amphetamine (ADDERALL) 15 mg tablet       gabapentin (NEURONTIN) 300 MG capsule Take 300 mg by mouth 3 (three) times daily.      HYDROcodone-acetaminophen (NORCO)  mg per tablet       nabumetone (RELAFEN) 500 MG tablet Take 500 mg by mouth 2 (two) times daily.      omega-3 acid ethyl esters (LOVAZA) 1 gram capsule       rosuvastatin (CRESTOR) 10 MG tablet Take 10 mg by mouth once daily.       ondansetron (ZOFRAN-ODT) 8 MG TbDL        No current facility-administered medications on file prior to visit.        Social History:   Social History     Socioeconomic History    Marital status:      Spouse name: Not on file    Number of children: Not on file    Years of education: Not on file    Highest education level: Not on file   Occupational History    Not on file   Social Needs    Financial resource strain: Not on file    Food insecurity:     Worry: Not on file     Inability: Not on file    Transportation needs:     Medical: Not on file     Non-medical: Not on file   Tobacco Use    Smoking status: Never Smoker    Smokeless tobacco: Never Used   Substance and Sexual Activity    Alcohol use: No     Comment: socially    Drug use: No    Sexual activity: Yes     Partners: Female   Lifestyle    Physical activity:     Days per week: Not on file     Minutes per session: Not on file    Stress: Not on file   Relationships    Social connections:     Talks on phone: Not on file     Gets together: Not on file     Attends [employer] service: Not on file     Active member of club or organization: Not on file     Attends meetings of clubs or organizations: Not on file     Relationship status: Not on file   Other Topics Concern    Not on file   Social History Narrative    Not on file       REVIEW OF SYSTEMS:  Constitution: Negative. Negative for chills, fever and night sweats.   Cardiovascular: Negative for chest pain and syncope.   Respiratory: Negative for cough and shortness of breath.   Gastrointestinal: See HPI. Negative for nausea/vomiting. Negative for abdominal pain.  Genitourinary: See HPI. Negative for discoloration or dysuria.  Skin: Negative for dry skin, itching and rash.   Hematologic/Lymphatic: Negative for bleeding problem. Does not bruise/bleed easily.   Musculoskeletal: Negative for falls and muscle weakness.   Neurological: See HPI. No seizures.   Endocrine: Negative for  "polydipsia, polyphagia and polyuria.   Allergic/Immunologic: Negative for hives and persistent infections.     EXAM:  Ht 6' 1" (1.854 m)   Wt 109.7 kg (241 lb 13.5 oz)   BMI 31.91 kg/m²     General: The patient is a very pleasant 48 y.o. male in no apparent distress, the patient is oriented to person, place and time.  Psych: Normal mood and affect  HEENT: Vision grossly intact, hearing intact to the spoken word.  Lungs: Respirations unlabored.  Gait: Normal station and gait, no difficulty with toe or heel walk.   Skin: Dorsal lumbar skin negative for rashes, lesions, hairy patches and surgical scars. There is mild lumbar tenderness to palpation.  Range of motion: Lumbar range of motion is acceptable.  Spinal Balance: Global saggital and coronal spinal balance acceptable, not significant for scoliosis and kyphosis.  Musculoskeletal: No pain with the range of motion of the bilateral hips. No trochanteric tenderness to palpation.  Vascular: Bilateral lower extremities warm and well perfused, dorsalis pedis pulses 2+ bilaterally.  Neurological: Normal strength and tone in all major motor groups in the bilateral lower extremities. Normal sensation to light touch in the L2-S1 dermatomes bilaterally.  Deep tendon reflexes symmetric 2+ in the bilateral lower extremities.  Negative Babinski bilaterally. Straight leg raise positive bilaterally, reproduces back pain.  straight leg raise on the right reproduces leg pain as well.     IMAGING:      Today I personally reviewed AP, Lat and Flex/Ex  upright L-spine films that demonstrate normal disc spacing and alignment.  Mild degenerative changes.         Body mass index is 31.91 kg/m².    No results found for: HGBA1C        ASSESSMENT/PLAN:    Jhony was seen today for low-back pain and leg pain.    Diagnoses and all orders for this visit:    Lumbar radiculopathy        The patient is having low back and R>L lower extremity radiculopathy that has been present for several " years.    Today we discussed at length all of the different treatment options including anti-inflammatories, acetaminophen, rest, ice, heat, physical therapy including strengthening and stretching exercises, home exercises, ROM, aerobic conditioning, aqua therapy, other modalities including ultrasound, massage, and dry needling, epidural steroid injections and finally surgical intervention.      The patient has failed conservative treatment including medications, physical therapy and injections.  He recently had an MRI at an outside facility but does not have the disc with him.  He will have this put on a disc and drop it off for me to review.  I will also have him fill out a release of information today so we can get his records from Dr. Penaloza.  I will call once I receive his records and review his imaging.       Follow up if symptoms worsen or fail to improve.

## 2019-09-09 DIAGNOSIS — M54.16 LUMBAR RADICULOPATHY: Primary | ICD-10-CM

## 2019-09-12 ENCOUNTER — TELEPHONE (OUTPATIENT)
Dept: NEUROLOGY | Facility: CLINIC | Age: 49
End: 2019-09-12

## 2019-09-12 NOTE — TELEPHONE ENCOUNTER
Returned call no answer no vm.      ----- Message from Jacqueline French sent at 9/12/2019  8:59 AM CDT -----  Contact: Emma (wife)[phone]  Type: Patient Call Back    Who called:Emma    What is the request in detail: Emma, wife of the patient, is requesting a call back from the staff. She would like to reschedule the patient's EMG appointment    Can the clinic reply by [facility]?no    Would the patient rather a call back or a response via [facility]? Call back    Best call back number:[phone]

## 2019-09-18 ENCOUNTER — PROCEDURE VISIT (OUTPATIENT)
Dept: NEUROLOGY | Facility: CLINIC | Age: 49
End: 2019-09-18
Payer: COMMERCIAL

## 2019-09-18 VITALS — WEIGHT: 241 LBS | HEIGHT: 73 IN | BODY MASS INDEX: 31.94 KG/M2

## 2019-09-18 DIAGNOSIS — M54.16 LUMBAR RADICULOPATHY: ICD-10-CM

## 2019-09-18 PROCEDURE — 95909 NRV CNDJ TST 5-6 STUDIES: CPT | Mod: S$GLB,,, | Performed by: PSYCHIATRY & NEUROLOGY

## 2019-09-18 PROCEDURE — 95886 MUSC TEST DONE W/N TEST COMP: CPT | Mod: S$GLB,,, | Performed by: PSYCHIATRY & NEUROLOGY

## 2019-09-18 PROCEDURE — 95886 PR EMG COMPLETE, W/ NERVE CONDUCTION STUDIES, 5+ MUSCLES: ICD-10-PCS | Mod: S$GLB,,, | Performed by: PSYCHIATRY & NEUROLOGY

## 2019-09-18 PROCEDURE — 95909 PR NERVE CONDUCTION STUDY; 5-6 STUDIES: ICD-10-PCS | Mod: S$GLB,,, | Performed by: PSYCHIATRY & NEUROLOGY

## 2019-09-22 NOTE — PROCEDURES
Procedures     Patient was referred for EMG/NCS. EMG/NCS was performed. Please see scanned EMG/NCS report for further details.    Patient was advised to follow up with referring physician for further management.    Namita Florence

## 2019-09-25 ENCOUNTER — PATIENT OUTREACH (OUTPATIENT)
Dept: ADMINISTRATIVE | Facility: OTHER | Age: 49
End: 2019-09-25

## 2019-09-25 ENCOUNTER — TELEPHONE (OUTPATIENT)
Dept: ORTHOPEDICS | Facility: CLINIC | Age: 49
End: 2019-09-25

## 2019-09-25 RX ORDER — HYDROCODONE BITARTRATE AND ACETAMINOPHEN 10; 325 MG/1; MG/1
TABLET ORAL
Status: CANCELLED | OUTPATIENT
Start: 2019-09-25

## 2019-10-14 ENCOUNTER — TELEPHONE (OUTPATIENT)
Dept: PAIN MEDICINE | Facility: CLINIC | Age: 49
End: 2019-10-14

## 2019-10-14 DIAGNOSIS — M54.16 LUMBAR RADICULOPATHY: Primary | ICD-10-CM

## 2019-11-15 ENCOUNTER — HOSPITAL ENCOUNTER (OUTPATIENT)
Facility: OTHER | Age: 49
Discharge: HOME | End: 2019-11-15
Attending: ANESTHESIOLOGY | Admitting: ANESTHESIOLOGY
Payer: COMMERCIAL

## 2019-11-15 VITALS
DIASTOLIC BLOOD PRESSURE: 91 MMHG | SYSTOLIC BLOOD PRESSURE: 158 MMHG | WEIGHT: 245 LBS | HEART RATE: 71 BPM | TEMPERATURE: 98 F | OXYGEN SATURATION: 96 % | BODY MASS INDEX: 32.47 KG/M2 | HEIGHT: 73 IN | RESPIRATION RATE: 18 BRPM

## 2019-11-15 DIAGNOSIS — M54.16 LUMBAR RADICULOPATHY: Primary | ICD-10-CM

## 2019-11-15 DIAGNOSIS — G89.29 CHRONIC PAIN: ICD-10-CM

## 2019-11-15 PROCEDURE — 63600175 PHARM REV CODE 636 W HCPCS: Performed by: ANESTHESIOLOGY

## 2019-11-15 PROCEDURE — 25000003 PHARM REV CODE 250: Performed by: ANESTHESIOLOGY

## 2019-11-15 PROCEDURE — 25500020 PHARM REV CODE 255: Performed by: ANESTHESIOLOGY

## 2019-11-15 PROCEDURE — 64484 NJX AA&/STRD TFRM EPI L/S EA: CPT | Mod: RT,,, | Performed by: ANESTHESIOLOGY

## 2019-11-15 PROCEDURE — 99152 PR MOD CONSCIOUS SEDATION, SAME PHYS, 5+ YRS, FIRST 15 MIN: ICD-10-PCS | Mod: ,,, | Performed by: ANESTHESIOLOGY

## 2019-11-15 PROCEDURE — 64483 PR EPIDURAL INJ, ANES/STEROID, TRANSFORAMINAL, LUMB/SACR, SNGL LEVL: ICD-10-PCS | Mod: RT,,, | Performed by: ANESTHESIOLOGY

## 2019-11-15 PROCEDURE — 64484 NJX AA&/STRD TFRM EPI L/S EA: CPT | Performed by: ANESTHESIOLOGY

## 2019-11-15 PROCEDURE — 99152 MOD SED SAME PHYS/QHP 5/>YRS: CPT | Mod: ,,, | Performed by: ANESTHESIOLOGY

## 2019-11-15 PROCEDURE — 64483 NJX AA&/STRD TFRM EPI L/S 1: CPT | Mod: RT,,, | Performed by: ANESTHESIOLOGY

## 2019-11-15 PROCEDURE — 64484 PRA INJECT ANES/STEROID FORAMEN LUMBAR/SACRAL W IMG GUIDE ,EA ADD LEVEL: ICD-10-PCS | Mod: RT,,, | Performed by: ANESTHESIOLOGY

## 2019-11-15 PROCEDURE — 64483 NJX AA&/STRD TFRM EPI L/S 1: CPT | Performed by: ANESTHESIOLOGY

## 2019-11-15 RX ORDER — FENTANYL CITRATE 50 UG/ML
INJECTION, SOLUTION INTRAMUSCULAR; INTRAVENOUS
Status: DISCONTINUED | OUTPATIENT
Start: 2019-11-15 | End: 2019-11-15 | Stop reason: HOSPADM

## 2019-11-15 RX ORDER — MIDAZOLAM HYDROCHLORIDE 1 MG/ML
INJECTION INTRAMUSCULAR; INTRAVENOUS
Status: DISCONTINUED | OUTPATIENT
Start: 2019-11-15 | End: 2019-11-15 | Stop reason: HOSPADM

## 2019-11-15 RX ORDER — LIDOCAINE HYDROCHLORIDE 5 MG/ML
INJECTION, SOLUTION INFILTRATION; INTRAVENOUS
Status: DISCONTINUED | OUTPATIENT
Start: 2019-11-15 | End: 2019-11-15 | Stop reason: HOSPADM

## 2019-11-15 RX ORDER — LIDOCAINE HYDROCHLORIDE 10 MG/ML
INJECTION INFILTRATION; PERINEURAL
Status: DISCONTINUED | OUTPATIENT
Start: 2019-11-15 | End: 2019-11-15 | Stop reason: HOSPADM

## 2019-11-15 RX ORDER — SODIUM CHLORIDE 9 MG/ML
500 INJECTION, SOLUTION INTRAVENOUS CONTINUOUS
Status: DISCONTINUED | OUTPATIENT
Start: 2019-11-15 | End: 2019-11-15 | Stop reason: HOSPADM

## 2019-11-15 RX ORDER — DEXAMETHASONE SODIUM PHOSPHATE 10 MG/ML
INJECTION INTRAMUSCULAR; INTRAVENOUS
Status: DISCONTINUED | OUTPATIENT
Start: 2019-11-15 | End: 2019-11-15 | Stop reason: HOSPADM

## 2019-11-15 RX ADMIN — SODIUM CHLORIDE 500 ML: 0.9 INJECTION, SOLUTION INTRAVENOUS at 10:11

## 2019-11-15 NOTE — H&P
"HPI  Patient presenting for Procedure(s) (LRB):  LUMBAR TRANSFORAMINAL RIGHT L4/5 AND L5/S1 TF [place] (Right)     Patient on Anti-coagulation No    No health changes since previous encounter    Past Medical History:   Diagnosis Date    Hypertension      History reviewed. No pertinent surgical history.  Review of patient's allergies indicates:  No Known Allergies   Current Facility-Administered Medications   Medication    0.9%  NaCl infusion       PMHx, PSHx, Allergies, Medications reviewed in epic    ROS negative except pain complaints in HPI    OBJECTIVE:    BP (!) 163/88 (BP Location: Right arm, Patient Position: Sitting)   Pulse 72   Temp 98.2 °F (36.8 °C) (Oral)   Resp 18   Ht 6' 1" (1.854 m)   Wt 111.1 kg (245 lb)   SpO2 98%   BMI 32.32 kg/m²     PHYSICAL EXAMINATION:    GENERAL: Well appearing, in no acute distress, alert and oriented x3.  PSYCH:  Mood and affect appropriate.  SKIN: Skin color, texture, turgor normal, no rashes or lesions which will impact the procedure.  CV: RRR with palpation of the radial artery.  PULM: No evidence of respiratory difficulty, symmetric chest rise. Clear to auscultation.  NEURO: Cranial nerves grossly intact.    Plan:    Proceed with procedure as planned Procedure(s) (LRB):  LUMBAR TRANSFORAMINAL RIGHT L4/5 AND L5/S1 TF [place] (Right)    Sherif Preston  11/15/2019            "

## 2019-11-15 NOTE — DISCHARGE SUMMARY
Discharge Note  Short Stay      SUMMARY     Admit Date: 11/15/2019    Attending Physician: Sheirf Preston      Discharge Physician: Sherif Preston      Discharge Date: 11/15/2019 11:16 AM    Procedure(s) (LRB):  LUMBAR TRANSFORAMINAL RIGHT L4/5 AND L5/S1 TF [place] (Right)    Final Diagnosis: Lumbar radiculopathy [M54.16]    Disposition: Home or self care    Patient Instructions:   Current Discharge Medication List      CONTINUE these medications which have NOT CHANGED    Details   cyclobenzaprine (FLEXERIL) 10 MG tablet       dextroamphetamine-amphetamine (ADDERALL) 15 mg tablet       gabapentin (NEURONTIN) 300 MG capsule Take 300 mg by mouth 3 (three) times daily.      HYDROcodone-acetaminophen (NORCO)  mg per tablet       nabumetone (RELAFEN) 500 MG tablet Take 500 mg by mouth 2 (two) times daily.      omega-3 acid ethyl esters (LOVAZA) 1 gram capsule       ondansetron (ZOFRAN-ODT) 8 MG TbDL       rosuvastatin (CRESTOR) 10 MG tablet Take 10 mg by mouth once daily.                 Discharge Diagnosis: Lumbar radiculopathy [M54.16]  Condition on Discharge: Stable with no complications to procedure   Diet on Discharge: Same as before.  Activity: as per instruction sheet.  Discharge to: Home with a responsible adult.  Follow up: 2-4 weeks       Please call my office or pager at [phone] if experienced any weakness or loss of sensation, fever > 101.5, pain uncontrolled with oral medications, persistent nausea/vomiting/or diarrhea, redness or drainage from the incisions, or any other worrisome concerns. If physician on call was not reached or could not communicate with our office for any reason please go to the nearest emergency department

## 2019-11-15 NOTE — DISCHARGE INSTRUCTIONS
Thank you for allowing us to care for you today. You may receive a survey about the care we provided. Your feedback is valuable and helps us provide excellent care throughout the community.     Home Care Instructions for Pain Management:    1. DIET:   You may resume your normal diet today.   2. BATHING:   You may shower with luke warm water. No tub baths or anything that will soak injection sites under water for the next 24 hours.  3. DRESSING:   You may remove your bandage today.   4. ACTIVITY LEVEL:   You may resume your normal activities 24 hrs after your procedure. Nothing strenuous today.  5. MEDICATIONS:   You may resume your normal medications today. To restart blood thinners, ask your doctor.  6. DRIVING    If you have received any sedatives by mouth today, you may not drive for 12 hours.    If you have received any sedation through your IV, you may not drive for 24 hrs.   7. SPECIAL INSTRUCTIONS:   No heat to the injection site for 24 hrs including, hot bath or shower, heating pad, moist heat, or hot tubs.    Use ice pack to injection site for any pain or discomfort.  Apply ice packs for 20 minute intervals as needed.    IF you have diabetes, be sure to monitor your blood sugar more closely. IF your injection contained steroids your blood sugar levels may become higher than normal.    If you are still having pain upon discharge:  Your pain may improve over the next 48 hours. The anesthetic (numbing medication) works immediately to 48 hours. IF your injection contained a steroid (anti-inflammatory medication), it takes approximately 3 days to start feeling relief and 7-10 days to see your greatest results from the medication. It is possible you may need subsequent injections. This would be discussed at your follow up appointment with pain management or your referring doctor.    Please call the PAIN MANAGEMENT office at [phone] or ON CALL pager at [phone] if you experienced any:   -Weakness or  loss of sensation  -Fever > 101.5  -Pain uncontrolled with oral medications   -Persistent nausea, vomiting, or diarrhea  -Redness or drainage from the injection sites, or any other worrisome concerns.   If physician on call was not reached or could not communicate with our office for any reason please go to the nearest emergency department.  Adult Procedural Sedation Instructions    Recovery After Procedural Sedation (Adult)  You have been given medicine by vein to make you sleep during your surgery. This may have included both a pain medicine and sleeping medicine. Most of the effects have worn off. But you may still have some drowsiness for the next 6 to 8 hours.  Home care  Follow these guidelines when you get home:  · For the next 8 hours, you should be watched by a responsible adult. This person should make sure your condition is not getting worse.  · Don't drink any alcohol for the next 24 hours.  · Don't drive, operate dangerous machinery, or make important business or personal decisions during the next 24 hours.  Note: Your healthcare provider may tell you not to take any medicine by mouth for pain or sleep in the next 4 hours. These medicines may react with the medicines you were given in the hospital. This could cause a much stronger response than usual.  Follow-up care  Follow up with your healthcare provider if you are not alert and back to your usual level of activity within 12 hours.  When to seek medical advice  Call your healthcare provider right away if any of these occur:  · Drowsiness gets worse  · Weakness or dizziness gets worse  · Repeated vomiting  · You can't be awakened   Date Last Reviewed: 10/18/2016  © 8819-0336 [employer]. [address] [place] [address], [place] [address]. All rights reserved. This information is not intended as a substitute for professional medical care. Always follow your healthcare professional's instructions.

## 2019-11-15 NOTE — OP NOTE
Patient Name: Jhony Matute  MRN: [id number]    INFORMED CONSENT: The procedure, risks, benefits and options were discussed with patient. There are no contraindications to the procedure. The patient expressed understanding and agreed to proceed. The personnel performing the procedure was discussed. I verify that I personally obtained Jhony's consent prior to the start of the procedure and the signed consent can be found on the patient's chart.    Procedure Date: 11/15/2019    Anesthesia: Topical    Pre Procedure diagnosis: Lumbar radiculopathy [M54.16]  1. Lumbar radiculopathy    2. Chronic pain      Post-Procedure diagnosis: SAME      Sedation: Yes - Fentanyl 50 mcg and Midazolam 2 mg    PROCEDURE:Right L4/L5 and L5/S1   TRANSFORAMINAL EPIDURAL STEROID INJECTION        DESCRIPTION OF PROCEDURE: The patient was brought to the procedure room. After performing time out IV access was obtained prior to the procedure. The patient was positioned prone on the fluoroscopy table. Continuous hemodynamic monitoring was initiated including blood pressure, EKG, and pulse oximetry.  The skin was prepped with chlorhexidine three times and draped in a sterile fashion. Skin anesthesia was achieved using 3 mL of lidocaine 1% over the respective injection site.     An oblique fluoroscopic view was obtained, with the superior articular process of the inferior vertebral body aligned with the pedicle. The tip of a 22-gauge 5-inch Quincke-type spinal needle was advanced toward the 6 oclock position of the pedicle under intermittent fluoroscopic guidance. Confirmation of proper needle position was made with AP, oblique, and lateral fluoroscopic views. Negative aspiration for blood or CSF was confirmed. 2 mL of Omnipaque 300 was injected. Live fluoroscopic imaging revealed a clear outline of the spinal nerve with proximal spread of agent through the neural foramen into the anterior epidural space. A total combination of 3 mL of Lidocaine 0.5%  and 10 mg decadron was injected at each level. Contrast spread was noted from L3 to S1 level. There was no pain on injection. The needle was removed and bleeding was nil.  A sterile dressing was applied. Jhony was taken back to the recovery room for further observation.     Blood Loss: Nill  Specimen: None    Michael Montes MD

## 2020-01-06 ENCOUNTER — OFFICE VISIT (OUTPATIENT)
Dept: ORTHOPEDICS | Facility: CLINIC | Age: 50
End: 2020-01-06
Payer: COMMERCIAL

## 2020-01-06 VITALS — HEIGHT: 73 IN | BODY MASS INDEX: 32.47 KG/M2 | WEIGHT: 245 LBS

## 2020-01-06 DIAGNOSIS — M54.16 LUMBAR RADICULOPATHY: Primary | ICD-10-CM

## 2020-01-06 PROCEDURE — 99213 OFFICE O/P EST LOW 20 MIN: CPT | Mod: S$GLB,,, | Performed by: PHYSICIAN ASSISTANT

## 2020-01-06 PROCEDURE — 99213 PR OFFICE/OUTPT VISIT, EST, LEVL III, 20-29 MIN: ICD-10-PCS | Mod: S$GLB,,, | Performed by: PHYSICIAN ASSISTANT

## 2020-01-06 PROCEDURE — 99999 PR PBB SHADOW E&M-EST. PATIENT-LVL III: CPT | Mod: PBBFAC,,, | Performed by: PHYSICIAN ASSISTANT

## 2020-01-06 PROCEDURE — 3008F BODY MASS INDEX DOCD: CPT | Mod: CPTII,S$GLB,, | Performed by: PHYSICIAN ASSISTANT

## 2020-01-06 PROCEDURE — 99999 PR PBB SHADOW E&M-EST. PATIENT-LVL III: ICD-10-PCS | Mod: PBBFAC,,, | Performed by: PHYSICIAN ASSISTANT

## 2020-01-06 PROCEDURE — 3008F PR BODY MASS INDEX (BMI) DOCUMENTED: ICD-10-PCS | Mod: CPTII,S$GLB,, | Performed by: PHYSICIAN ASSISTANT

## 2020-01-06 NOTE — PROGRESS NOTES
DATE: 1/6/2020  PATIENT: Jhony Matute    Attending Physician: Franklin Adkins M.D.    HISTORY:  Jhony Matute is a 49 y.o. male who returns to me today for follow up of low back and right leg pain.  He was last seen by me 8/28/2019.  Today he is doing well but notes he had an [place] 11/15/2019 with Dr. Montes.  He says he had about 65% relief after the injection but it only lasted about 2 weeks.  He has had several injections in the past.  The first injection several years ago lasted a year.  The injections are providing less relief and lasting shorter amounts of time at this point.  The pain is primarily in the low back and radiates to the right leg, but he says now it is radiating to the left as well.  He takes gabapentin, norco, flexeril and relafen for pain.  He says only the norco gives him some relief.  He has tried physical therapy in the past as well with little relief.     The Patient denies myelopathic symptoms such as handwriting changes or difficulty with buttons/coins/keys. Denies perineal paresthesias, bowel/bladder dysfunction.    PMH/PSH/FamHx/SocHx:  Unchanged from prior visit    ROS:  REVIEW OF SYSTEMS:  Constitution: Negative. Negative for chills, fever and night sweats.   HENT: Negative for congestion and headaches.    Eyes: Negative for blurred vision, left vision loss and right vision loss.   Cardiovascular: Negative for chest pain and syncope.   Respiratory: Negative for cough and shortness of breath.    Endocrine: Negative for polydipsia, polyphagia and polyuria.   Hematologic/Lymphatic: Negative for bleeding problem. Does not bruise/bleed easily.   Skin: Negative for dry skin, itching and rash.   Musculoskeletal: Negative for falls and muscle weakness.   Gastrointestinal: Negative for abdominal pain and bowel incontinence.   Allergic/Immunologic: Negative for hives and persistent infections.  Genitourinary: Negative for urinary retention/incontinence and nocturia.   Neurological: Negative  "for disturbances in coordination, no myelopathic symptoms such as handwriting changes or difficulty with buttons, coins, keys or small objects. No loss of balance and seizures.   Psychiatric/Behavioral: Negative for depression. The patient does not have insomnia.   Denies perineal paresthesias, bowel or bladder incontinence    EXAM:  Ht 6' 1" (1.854 m)   Wt 111.1 kg (245 lb)   BMI 32.32 kg/m²     Physical exam stable. Neuro exam stable.       IMAGING:  No new imaging today.    Today I personally re- reviewed AP, Lat and Flex/Ex  upright L-spine that demonstrate normal disc spacing and alignment.  Mild degenerative changes.     MRI lumbar spine from an outside facility shows no significant spinal stenosis.  There is mild neural foraminal stenosis at L4/5 bilaterally.     EMG shows mild chronic bilateral L4/5 and L5/S1 lumbar radiculopathies.  No active denervation.       Body mass index is 32.32 kg/m².    No results found for: HGBA1C      ASSESSMENT/PLAN:    Jhony was seen today for follow-up.    Diagnoses and all orders for this visit:    Lumbar radiculopathy      I will discuss with Dr. Adkins to see what surgical options he might recommend.    Follow up if symptoms worsen or fail to improve.    "

## 2020-01-14 DIAGNOSIS — M54.16 LUMBAR RADICULOPATHY: Primary | ICD-10-CM

## 2020-01-17 ENCOUNTER — HOSPITAL ENCOUNTER (OUTPATIENT)
Dept: RADIOLOGY | Facility: HOSPITAL | Age: 50
Discharge: HOME | End: 2020-01-17
Attending: PHYSICIAN ASSISTANT
Payer: COMMERCIAL

## 2020-01-17 DIAGNOSIS — M54.16 LUMBAR RADICULOPATHY: ICD-10-CM

## 2020-01-17 PROCEDURE — 72148 MRI LUMBAR SPINE WITHOUT CONTRAST: ICD-10-PCS | Mod: 26,,, | Performed by: RADIOLOGY

## 2020-01-17 PROCEDURE — 72148 MRI LUMBAR SPINE W/O DYE: CPT | Mod: TC

## 2020-01-17 PROCEDURE — 72148 MRI LUMBAR SPINE W/O DYE: CPT | Mod: 26,,, | Performed by: RADIOLOGY

## 2020-01-20 ENCOUNTER — OFFICE VISIT (OUTPATIENT)
Dept: ORTHOPEDICS | Facility: CLINIC | Age: 50
End: 2020-01-20
Payer: COMMERCIAL

## 2020-01-20 VITALS — BODY MASS INDEX: 33.29 KG/M2 | WEIGHT: 251.19 LBS | HEIGHT: 73 IN

## 2020-01-20 DIAGNOSIS — M51.36 DDD (DEGENERATIVE DISC DISEASE), LUMBAR: Primary | ICD-10-CM

## 2020-01-20 PROCEDURE — 3008F PR BODY MASS INDEX (BMI) DOCUMENTED: ICD-10-PCS | Mod: CPTII,S$GLB,, | Performed by: ORTHOPAEDIC SURGERY

## 2020-01-20 PROCEDURE — 99213 OFFICE O/P EST LOW 20 MIN: CPT | Mod: S$GLB,,, | Performed by: ORTHOPAEDIC SURGERY

## 2020-01-20 PROCEDURE — 99999 PR PBB SHADOW E&M-EST. PATIENT-LVL II: CPT | Mod: PBBFAC,,, | Performed by: ORTHOPAEDIC SURGERY

## 2020-01-20 PROCEDURE — 99213 PR OFFICE/OUTPT VISIT, EST, LEVL III, 20-29 MIN: ICD-10-PCS | Mod: S$GLB,,, | Performed by: ORTHOPAEDIC SURGERY

## 2020-01-20 PROCEDURE — 3008F BODY MASS INDEX DOCD: CPT | Mod: CPTII,S$GLB,, | Performed by: ORTHOPAEDIC SURGERY

## 2020-01-20 PROCEDURE — 99999 PR PBB SHADOW E&M-EST. PATIENT-LVL II: ICD-10-PCS | Mod: PBBFAC,,, | Performed by: ORTHOPAEDIC SURGERY

## 2020-01-24 NOTE — PROGRESS NOTES
The patient returns for follow-up.  He has been dealing with low back pain and right lower extremity radiculopathy.    This has not responded well to an epidural or physical therapy.    Today I reviewed an MRI of his lumbar spine demonstrate no significant central stenosis or evidence of instability.    An EMG of his lower extremities demonstrates bilateral L4-S1 radiculopathy.    Today we discussed options, I do not recommend surgical management of this problem.  I have referred him to pain management for further evaluation.    I spent 15 minutes with the patient of which greater than 1/2 the time was devoted to counciling the patient regarding treatment options.

## 2020-02-05 ENCOUNTER — OFFICE VISIT (OUTPATIENT)
Dept: PAIN MEDICINE | Facility: CLINIC | Age: 50
End: 2020-02-05
Attending: ANESTHESIOLOGY
Payer: COMMERCIAL

## 2020-02-05 VITALS
DIASTOLIC BLOOD PRESSURE: 94 MMHG | SYSTOLIC BLOOD PRESSURE: 133 MMHG | WEIGHT: 253.06 LBS | RESPIRATION RATE: 18 BRPM | HEART RATE: 71 BPM | BODY MASS INDEX: 33.54 KG/M2 | HEIGHT: 73 IN | OXYGEN SATURATION: 100 % | TEMPERATURE: 98 F

## 2020-02-05 DIAGNOSIS — M54.16 LUMBAR RADICULOPATHY: ICD-10-CM

## 2020-02-05 DIAGNOSIS — G89.4 CHRONIC PAIN DISORDER: ICD-10-CM

## 2020-02-05 DIAGNOSIS — M51.36 DDD (DEGENERATIVE DISC DISEASE), LUMBAR: Primary | ICD-10-CM

## 2020-02-05 PROBLEM — M51.369 DDD (DEGENERATIVE DISC DISEASE), LUMBAR: Status: ACTIVE | Noted: 2020-02-05

## 2020-02-05 PROCEDURE — 99999 PR PBB SHADOW E&M-EST. PATIENT-LVL IV: CPT | Mod: PBBFAC,,, | Performed by: ANESTHESIOLOGY

## 2020-02-05 PROCEDURE — 99214 PR OFFICE/OUTPT VISIT, EST, LEVL IV, 30-39 MIN: ICD-10-PCS | Mod: S$GLB,,, | Performed by: ANESTHESIOLOGY

## 2020-02-05 PROCEDURE — 99214 OFFICE O/P EST MOD 30 MIN: CPT | Mod: S$GLB,,, | Performed by: ANESTHESIOLOGY

## 2020-02-05 PROCEDURE — 3008F PR BODY MASS INDEX (BMI) DOCUMENTED: ICD-10-PCS | Mod: CPTII,S$GLB,, | Performed by: ANESTHESIOLOGY

## 2020-02-05 PROCEDURE — 3008F BODY MASS INDEX DOCD: CPT | Mod: CPTII,S$GLB,, | Performed by: ANESTHESIOLOGY

## 2020-02-05 PROCEDURE — 99999 PR PBB SHADOW E&M-EST. PATIENT-LVL IV: ICD-10-PCS | Mod: PBBFAC,,, | Performed by: ANESTHESIOLOGY

## 2020-02-05 NOTE — H&P (VIEW-ONLY)
"  Chronic Pain - New Consult    Referring Physician: Franklin Adkins MD    Chief Complaint:   Chief Complaint   Patient presents with    Low-back Pain    Leg Pain     Bilateral    Foot Pain     Bilateral        SUBJECTIVE: Disclaimer: This note has been generated using voice-recognition software. There may be typographical errors that have been missed during proof-reading    Initial encounter:    Jhony Matute presents to the clinic for the evaluation of lower back pain. The pain started several eyars ago following no inciting event and symptoms have been unchanged.    Brief history:    Pain Description:    The pain is located in the lumbosacral area and radiates to the BLE to his heels. For several years he has had numbness of his Right lateral thigh. He endorses weakness in his thighs after prolonged sitting and sometimes feels he needs to prop himself up at his desk.     At BEST  8/10     At WORST  10/10 on the WORST day.      On average pain is rated as 10/10.     Today the pain is rated as 9/10    The pain is described as numbing, sharp and shooting      Symptoms interfere with daily activity, sleeping and work.     Exacerbating factors: Sitting, Bending and Walking.      Mitigating factors medications.     Patient denies night fever/night sweats, urinary incontinence, bowel incontinence and significant weight loss.  Patient denies any suicidal or homicidal ideations    Pain Medications:  Current:  -Hydrocodone tid prn; gabapentin 300 bid, nabumetone, flexeril    Tried in Past:  NSAIDs -nabumetone  TCA -Never  SNRI -Never  Anti-convulsants -gabapentin  Muscle Relaxants -flexeril  Opioids-norco    Physical Therapy/Home Exercise: yes (in 2015 or 2016) "did not help much"     report:  Reviewed and consistent with medication use as prescribed.    Pain Procedures:   -11/15/19 R L4-5 and L5-S1 TFESI  - 3 other ESIs over past 3 years in [place]    Chiropractor -never  Acupuncture - never  TENS unit " -never  Spinal decompression -never  Joint replacement -never    Imagin20 MRI Lumbar   FINDINGS:  Alignment: Alignment is within normal limits.    Vertebrae: Vertebral body heights are well maintained with no evidence of fracture or marrow replacement process.    Discs: Multilevel disc desiccation with maintained disc heights.    Cord: Distal cord is normal in contour and signal intensity.  Conus terminates at L1.    Degenerative findings:    T12-L1: Circumferential disc bulge with left foraminal zone protrusion resulting in mild left neural foraminal narrowing.    L1-L2: Circumferential disc bulge results in mild right neural foraminal narrowing.    L2-L3: Circumferential disc bulge and mild facet arthropathy result in mild right neural foraminal narrowing.    L3-L4: Circumferential disc bulge and mild facet arthropathy noted.  No spinal canal stenosis or neural foraminal narrowing.    L4-L5: Circumferential disc bulge and mild facet arthropathy result in mild bilateral neural foraminal narrowing.    L5-S1: Mild facet arthropathy noted.  No spinal canal stenosis or neural foraminal narrowing.    Paraspinal muscles & soft tissues: Unremarkable.    Partially visualized abdominal organs are unremarkable.    Sacroiliac joints are unremarkable.  Small subcentimeter T1/T2 hypointense lesion in the right inferior iliac bone, favoring a bone island.      Impression       Mild multilevel degenerative changes of the lumbar spine resulting in mild neural foraminal narrowing.     19 EMG BLE:  bilateral L4-S1 radiculopathy.      Past Medical History:   Diagnosis Date    Hypertension      Past Surgical History:   Procedure Laterality Date    TRANSFORAMINAL EPIDURAL INJECTION OF STEROID Right 11/15/2019    Procedure: LUMBAR TRANSFORAMINAL RIGHT L4/5 AND L5/S1 TF [place];  Surgeon: Michael Montes MD;  Location: [facility];  Service: Pain Management;  Laterality: Right;  NEEDS CONSENT     Social History      Socioeconomic History    Marital status:      Spouse name: Not on file    Number of children: Not on file    Years of education: Not on file    Highest education level: Not on file   Occupational History    Not on file   Social Needs    Financial resource strain: Not on file    Food insecurity:     Worry: Not on file     Inability: Not on file    Transportation needs:     Medical: Not on file     Non-medical: Not on file   Tobacco Use    Smoking status: Never Smoker    Smokeless tobacco: Never Used   Substance and Sexual Activity    Alcohol use: No     Comment: socially    Drug use: No    Sexual activity: Yes     Partners: Female   Lifestyle    Physical activity:     Days per week: Not on file     Minutes per session: Not on file    Stress: Not on file   Relationships    Social connections:     Talks on phone: Not on file     Gets together: Not on file     Attends Shinto service: Not on file     Active member of club or organization: Not on file     Attends meetings of clubs or organizations: Not on file     Relationship status: Not on file   Other Topics Concern    Not on file   Social History Narrative    Not on file     Family History   Problem Relation Age of Onset    Stroke Mother     Stroke Father     Heart attack Father        Review of patient's allergies indicates:  No Known Allergies    Current Outpatient Medications   Medication Sig    cyclobenzaprine (FLEXERIL) 10 MG tablet     dextroamphetamine-amphetamine (ADDERALL) 15 mg tablet     gabapentin (NEURONTIN) 300 MG capsule Take 300 mg by mouth 3 (three) times daily.    HYDROcodone-acetaminophen (NORCO)  mg per tablet     omega-3 acid ethyl esters (LOVAZA) 1 gram capsule     rosuvastatin (CRESTOR) 10 MG tablet Take 10 mg by mouth once daily.    nabumetone (RELAFEN) 500 MG tablet Take 500 mg by mouth 2 (two) times daily.    ondansetron (ZOFRAN-ODT) 8 MG TbDL      No current facility-administered medications  "for this visit.        REVIEW OF SYSTEMS:    GENERAL:  No weight loss, malaise or fevers.  HEENT:   No recent changes in vision or hearing  NECK:  Negative for lumps, no difficulty with swallowing.  RESPIRATORY:  Negative for cough, wheezing or shortness of breath, patient denies any recent URI.  CARDIOVASCULAR:  Negative for chest pain, leg swelling or palpitations.  GI:  Negative for abdominal discomfort, blood in stools or black stools or change in bowel habits.  MUSCULOSKELETAL:  See HPI.  SKIN:  Negative for lesions, rash, and itching.  PSYCH:  No mood disorder or recent psychosocial stressors.  Sleep disorder  HEMATOLOGY/LYMPHOLOGY:  Negative for prolonged bleeding, bruising easily or swollen nodes.  Patient is not currently taking any anti-coagulants  ENDO: No history of diabetes or thyroid dysfunction. gout  NEURO:   No history of headaches, syncope, paralysis, seizures or tremors.  All other reviewed and negative other than HPI.    OBJECTIVE:    BP (!) 133/94   Pulse 71   Temp 98.1 °F (36.7 °C)   Resp 18   Ht 6' 1" (1.854 m)   Wt 114.8 kg (253 lb 1.4 oz)   SpO2 100%   BMI 33.39 kg/m²     BMP  Lab Results   Component Value Date     01/15/2018    K 4.2 01/15/2018    CL 99 01/15/2018    CO2 28 01/15/2018    BUN 13 01/15/2018    CREATININE 1.2 01/15/2018    CALCIUM 9.7 01/15/2018    ANIONGAP 9 01/15/2018    ESTGFRAFRICA >60.0 01/15/2018    EGFRNONAA >60.0 01/15/2018       PHYSICAL EXAMINATION:    GENERAL: Well appearing, in no acute distress, alert and oriented x3.  PSYCH:  Mood and affect appropriate.  SKIN: Skin color, texture, turgor normal, no rashes or lesions.  HEAD/FACE:  Normocephalic, atraumatic. Cranial nerves grossly intact.  NECK: No pain to palpation over the cervical paraspinous muscles. Spurling Negative. No pain with neck flexion, extension, or lateral flexion.   CV: RRR with palpation of the radial artery.  PULM: No evidence of respiratory difficulty, symmetric chest rise.  GI:  " Soft and non-tender.  BACK: +SLR bilaterally. No pain to palpation over the facet joints of the lumbar spine or spinous processes. Normal range of motion without pain reproduction.  EXTREMITIES: Peripheral joint ROM is full and pain free without obvious instability or laxity in all four extremities. No deformities, edema, or skin discoloration. Good capillary refill.  MUSCULOSKELETAL: Shoulder, hip, and knee provocative maneuvers are negative.  There is no pain with palpation over the sacroiliac joints bilaterally.  FABERs test is negative although causes axial back pain.  FADIRs test is negative.   Strength 4/5 Right EHL extension and thigh flexion. Otherwise strength is normal upper and lower extremity on left.  No atrophy or tone abnormalities are noted.  NEURO: Bilateral upper and lower extremity coordination and muscle stretch reflexes are physiologic and symmetric.  Plantar response are downgoing. No clonus.  Decreased sensation Right thigh above knee  GAIT: antalgic.      ASSESSMENT: 49 y.o. year old male with pain, consistent with     Encounter Diagnoses   Name Primary?    DDD (degenerative disc disease), lumbar Yes    Lumbar radiculopathy     Chronic pain disorder        PLAN:   - I have stressed the importance of physical activity and a home exercise plan to help with pain and improve health.    - Patient can continue with medications for now since they are providing benefits, using them appropriately, and without side effects.    - Schedule for R L2-3 and L4-5 TFESI    - Advised patient to take Gabapentin 300mg TID instead of BID    - Referral to Healthy Back Program    - RTC 2 weeks    The above plan and management options were discussed at length with patient. Patient is in agreement with the above and verbalized understanding. It will be communicated with the referring physician via electronic record, fax, or mail.    Hamzah Cade  02/05/2020    I reviewed and edited the  fellow's note, I  conducted my own interview and physical examination and agree with the findings.     Shon Brunner  02/06/2020

## 2020-02-05 NOTE — PROGRESS NOTES
"  Chronic Pain - New Consult    Referring Physician: Franklin Adkins MD    Chief Complaint:   Chief Complaint   Patient presents with    Low-back Pain    Leg Pain     Bilateral    Foot Pain     Bilateral        SUBJECTIVE: Disclaimer: This note has been generated using voice-recognition software. There may be typographical errors that have been missed during proof-reading    Initial encounter:    Jhony Matute presents to the clinic for the evaluation of lower back pain. The pain started several eyars ago following no inciting event and symptoms have been unchanged.    Brief history:    Pain Description:    The pain is located in the lumbosacral area and radiates to the BLE to his heels. For several years he has had numbness of his Right lateral thigh. He endorses weakness in his thighs after prolonged sitting and sometimes feels he needs to prop himself up at his desk.     At BEST  8/10     At WORST  10/10 on the WORST day.      On average pain is rated as 10/10.     Today the pain is rated as 9/10    The pain is described as numbing, sharp and shooting      Symptoms interfere with daily activity, sleeping and work.     Exacerbating factors: Sitting, Bending and Walking.      Mitigating factors medications.     Patient denies night fever/night sweats, urinary incontinence, bowel incontinence and significant weight loss.  Patient denies any suicidal or homicidal ideations    Pain Medications:  Current:  -Hydrocodone tid prn; gabapentin 300 bid, nabumetone, flexeril    Tried in Past:  NSAIDs -nabumetone  TCA -Never  SNRI -Never  Anti-convulsants -gabapentin  Muscle Relaxants -flexeril  Opioids-norco    Physical Therapy/Home Exercise: yes (in 2015 or 2016) "did not help much"     report:  Reviewed and consistent with medication use as prescribed.    Pain Procedures:   -11/15/19 R L4-5 and L5-S1 TFESI  - 3 other ESIs over past 3 years in [place]    Chiropractor -never  Acupuncture - never  TENS unit " -never  Spinal decompression -never  Joint replacement -never    Imagin20 MRI Lumbar   FINDINGS:  Alignment: Alignment is within normal limits.    Vertebrae: Vertebral body heights are well maintained with no evidence of fracture or marrow replacement process.    Discs: Multilevel disc desiccation with maintained disc heights.    Cord: Distal cord is normal in contour and signal intensity.  Conus terminates at L1.    Degenerative findings:    T12-L1: Circumferential disc bulge with left foraminal zone protrusion resulting in mild left neural foraminal narrowing.    L1-L2: Circumferential disc bulge results in mild right neural foraminal narrowing.    L2-L3: Circumferential disc bulge and mild facet arthropathy result in mild right neural foraminal narrowing.    L3-L4: Circumferential disc bulge and mild facet arthropathy noted.  No spinal canal stenosis or neural foraminal narrowing.    L4-L5: Circumferential disc bulge and mild facet arthropathy result in mild bilateral neural foraminal narrowing.    L5-S1: Mild facet arthropathy noted.  No spinal canal stenosis or neural foraminal narrowing.    Paraspinal muscles & soft tissues: Unremarkable.    Partially visualized abdominal organs are unremarkable.    Sacroiliac joints are unremarkable.  Small subcentimeter T1/T2 hypointense lesion in the right inferior iliac bone, favoring a bone island.      Impression       Mild multilevel degenerative changes of the lumbar spine resulting in mild neural foraminal narrowing.     19 EMG BLE:  bilateral L4-S1 radiculopathy.      Past Medical History:   Diagnosis Date    Hypertension      Past Surgical History:   Procedure Laterality Date    TRANSFORAMINAL EPIDURAL INJECTION OF STEROID Right 11/15/2019    Procedure: LUMBAR TRANSFORAMINAL RIGHT L4/5 AND L5/S1 TF [place];  Surgeon: Michael Montes MD;  Location: [facility];  Service: Pain Management;  Laterality: Right;  NEEDS CONSENT     Social History      Socioeconomic History    Marital status:      Spouse name: Not on file    Number of children: Not on file    Years of education: Not on file    Highest education level: Not on file   Occupational History    Not on file   Social Needs    Financial resource strain: Not on file    Food insecurity:     Worry: Not on file     Inability: Not on file    Transportation needs:     Medical: Not on file     Non-medical: Not on file   Tobacco Use    Smoking status: Never Smoker    Smokeless tobacco: Never Used   Substance and Sexual Activity    Alcohol use: No     Comment: socially    Drug use: No    Sexual activity: Yes     Partners: Female   Lifestyle    Physical activity:     Days per week: Not on file     Minutes per session: Not on file    Stress: Not on file   Relationships    Social connections:     Talks on phone: Not on file     Gets together: Not on file     Attends Shinto service: Not on file     Active member of club or organization: Not on file     Attends meetings of clubs or organizations: Not on file     Relationship status: Not on file   Other Topics Concern    Not on file   Social History Narrative    Not on file     Family History   Problem Relation Age of Onset    Stroke Mother     Stroke Father     Heart attack Father        Review of patient's allergies indicates:  No Known Allergies    Current Outpatient Medications   Medication Sig    cyclobenzaprine (FLEXERIL) 10 MG tablet     dextroamphetamine-amphetamine (ADDERALL) 15 mg tablet     gabapentin (NEURONTIN) 300 MG capsule Take 300 mg by mouth 3 (three) times daily.    HYDROcodone-acetaminophen (NORCO)  mg per tablet     omega-3 acid ethyl esters (LOVAZA) 1 gram capsule     rosuvastatin (CRESTOR) 10 MG tablet Take 10 mg by mouth once daily.    nabumetone (RELAFEN) 500 MG tablet Take 500 mg by mouth 2 (two) times daily.    ondansetron (ZOFRAN-ODT) 8 MG TbDL      No current facility-administered medications  "for this visit.        REVIEW OF SYSTEMS:    GENERAL:  No weight loss, malaise or fevers.  HEENT:   No recent changes in vision or hearing  NECK:  Negative for lumps, no difficulty with swallowing.  RESPIRATORY:  Negative for cough, wheezing or shortness of breath, patient denies any recent URI.  CARDIOVASCULAR:  Negative for chest pain, leg swelling or palpitations.  GI:  Negative for abdominal discomfort, blood in stools or black stools or change in bowel habits.  MUSCULOSKELETAL:  See HPI.  SKIN:  Negative for lesions, rash, and itching.  PSYCH:  No mood disorder or recent psychosocial stressors.  Sleep disorder  HEMATOLOGY/LYMPHOLOGY:  Negative for prolonged bleeding, bruising easily or swollen nodes.  Patient is not currently taking any anti-coagulants  ENDO: No history of diabetes or thyroid dysfunction. gout  NEURO:   No history of headaches, syncope, paralysis, seizures or tremors.  All other reviewed and negative other than HPI.    OBJECTIVE:    BP (!) 133/94   Pulse 71   Temp 98.1 °F (36.7 °C)   Resp 18   Ht 6' 1" (1.854 m)   Wt 114.8 kg (253 lb 1.4 oz)   SpO2 100%   BMI 33.39 kg/m²     BMP  Lab Results   Component Value Date     01/15/2018    K 4.2 01/15/2018    CL 99 01/15/2018    CO2 28 01/15/2018    BUN 13 01/15/2018    CREATININE 1.2 01/15/2018    CALCIUM 9.7 01/15/2018    ANIONGAP 9 01/15/2018    ESTGFRAFR[place] >60.0 01/15/2018    EGFRNONAA >60.0 01/15/2018       PHYSICAL EXAMINATION:    GENERAL: Well appearing, in no acute distress, alert and oriented x3.  PSYCH:  Mood and affect appropriate.  SKIN: Skin color, texture, turgor normal, no rashes or lesions.  HEAD/FACE:  Normocephalic, atraumatic. Cranial nerves grossly intact.  NECK: No pain to palpation over the cervical paraspinous muscles. Spurling Negative. No pain with neck flexion, extension, or lateral flexion.   CV: RRR with palpation of the radial artery.  PULM: No evidence of respiratory difficulty, symmetric chest rise.  GI:  " Soft and non-tender.  BACK: +SLR bilaterally. No pain to palpation over the facet joints of the lumbar spine or spinous processes. Normal range of motion without pain reproduction.  EXTREMITIES: Peripheral joint ROM is full and pain free without obvious instability or laxity in all four extremities. No deformities, edema, or skin discoloration. Good capillary refill.  MUSCULOSKELETAL: Shoulder, hip, and knee provocative maneuvers are negative.  There is no pain with palpation over the sacroiliac joints bilaterally.  FABERs test is negative although causes axial back pain.  FADIRs test is negative.   Strength 4/5 Right EHL extension and thigh flexion. Otherwise strength is normal upper and lower extremity on left.  No atrophy or tone abnormalities are noted.  NEURO: Bilateral upper and lower extremity coordination and muscle stretch reflexes are physiologic and symmetric.  Plantar response are downgoing. No clonus.  Decreased sensation Right thigh above knee  GAIT: antalgic.      ASSESSMENT: 49 y.o. year old male with pain, consistent with     Encounter Diagnoses   Name Primary?    DDD (degenerative disc disease), lumbar Yes    Lumbar radiculopathy     Chronic pain disorder        PLAN:   - I have stressed the importance of physical activity and a home exercise plan to help with pain and improve health.    - Patient can continue with medications for now since they are providing benefits, using them appropriately, and without side effects.    - Schedule for R L2-3 and L4-5 TFESI    - Advised patient to take Gabapentin 300mg TID instead of BID    - Referral to Healthy Back Program    - RTC 2 weeks    The above plan and management options were discussed at length with patient. Patient is in agreement with the above and verbalized understanding. It will be communicated with the referring physician via electronic record, fax, or mail.    Hamzah Cade  02/05/2020    I reviewed and edited the  fellow's note, I  conducted my own interview and physical examination and agree with the findings.     Shon Brunner  02/06/2020

## 2020-02-05 NOTE — LETTER
February 6, 2020      Franklin Adkins MD  [address]  [facility] [address]           [facility] [place] [address] [facility] [address]  [address]  [facility] [address]  Phone: [phone]  Fax: [phone]          Patient: Jhony Matute   MR Number: [id number]   YOB: 1970   Date of Visit: 2/5/2020       Dear Dr. Franklin Adkins:    Thank you for referring Jhony Matute to me for evaluation. Attached you will find relevant portions of my assessment and plan of care.    If you have questions, please do not hesitate to call me. I look forward to following Jhony Matute along with you.    Sincerely,    Shon Brunner MD    Enclosure  CC:  No Recipients    If you would like to receive this communication electronically, please contact [email or web address] or [phone] to request more information on [employer] Link access.    For providers and/or their staff who would like to refer a patient to [facility], please contact us through our one-stop-shop provider referral line, [facility], at [phone].    If you feel you have received this communication in error or would no longer like to receive these types of communications, please e-mail [email or web address]

## 2020-02-06 ENCOUNTER — TELEPHONE (OUTPATIENT)
Dept: PAIN MEDICINE | Facility: CLINIC | Age: 50
End: 2020-02-06

## 2020-02-10 ENCOUNTER — TELEPHONE (OUTPATIENT)
Dept: PAIN MEDICINE | Facility: CLINIC | Age: 50
End: 2020-02-10

## 2020-02-11 ENCOUNTER — TELEPHONE (OUTPATIENT)
Dept: ADMINISTRATIVE | Facility: OTHER | Age: 50
End: 2020-02-11

## 2020-02-11 NOTE — TELEPHONE ENCOUNTER
----- Message from Wendy Kinney MA sent at 2/11/2020  2:13 PM CST -----  Below is  response.                     Wendy   ----- Message -----  From: Shon Brunner MD  Sent: 2/11/2020   1:40 PM CST  To: Wendy Kinney MA    Lets do the procdure, but he will keep doing the healthy back program.  And the healthy back maintenance program.    ----- Message -----  From: Wendy Kinney MA  Sent: 2/10/2020   4:58 PM CST  To: Shon Brunner MD    Patient was contacted as per patient he asked if provider would like for him to schedule his procedure or hold off until he's almost done with the healthy back program. Please advise                                     Wendy   ----- Message -----  From: Sherri Young  Sent: 2/10/2020   4:06 PM CST  To: Myesha Menendez Staff    Pt. States he's scheduled to start Healthy Back Program 2/13/20, he think Dr. Brunner will like for him to schedule  Lumbar Right L2-L3 & L4-L5 Tf Jeannine, 2 or 3 wks after the program has been completed. Please advise.

## 2020-02-13 ENCOUNTER — TELEPHONE (OUTPATIENT)
Dept: ADMINISTRATIVE | Facility: OTHER | Age: 50
End: 2020-02-13

## 2020-02-13 ENCOUNTER — CLINICAL SUPPORT (OUTPATIENT)
Dept: REHABILITATION | Facility: HOSPITAL | Age: 50
End: 2020-02-13
Payer: COMMERCIAL

## 2020-02-13 DIAGNOSIS — M25.69 DECREASED ROM OF TRUNK AND BACK: ICD-10-CM

## 2020-02-13 DIAGNOSIS — R29.898 WEAKNESS OF BOTH HIPS: ICD-10-CM

## 2020-02-13 DIAGNOSIS — G89.4 CHRONIC PAIN SYNDROME: Primary | ICD-10-CM

## 2020-02-13 DIAGNOSIS — G89.4 CHRONIC PAIN DISORDER: ICD-10-CM

## 2020-02-13 DIAGNOSIS — R29.898 IMPAIRED FLEXIBILITY OF LOWER EXTREMITY: ICD-10-CM

## 2020-02-13 DIAGNOSIS — M54.16 LUMBAR RADICULOPATHY: ICD-10-CM

## 2020-02-13 DIAGNOSIS — M51.36 DDD (DEGENERATIVE DISC DISEASE), LUMBAR: ICD-10-CM

## 2020-02-13 PROCEDURE — 97161 PT EVAL LOW COMPLEX 20 MIN: CPT

## 2020-02-13 PROCEDURE — 97110 THERAPEUTIC EXERCISES: CPT

## 2020-02-13 NOTE — PLAN OF CARE
[facility] - PHYSICAL THERAPY EVALUATION     Name: Jhony Matute  Clinic Number: [id number]    Therapy Diagnosis:   Encounter Diagnoses   Name Primary?    DDD (degenerative disc disease), lumbar     Lumbar radiculopathy     Chronic pain disorder     Chronic pain syndrome Yes    Weakness of both hips     Impaired flexibility of lower extremity     Decreased ROM of trunk and back      Physician: Hamzah Cade MD    Physician Orders: PT Eval and Treat   Medical Diagnosis from Referral:  M51.36 (ICD-10-CM) - DDD (degenerative disc disease), lumbar   M54.16 (ICD-10-CM) - Lumbar radiculopathy   G89.4 (ICD-10-CM) - Chronic pain disorder        Evaluation Date: 2/13/2020  Authorization Period Expiration: 5/13/2020  Plan of Care Expiration: 5/13/2020  Reassessment Due: 3/13/2020  Visit # / Visits authorized: 1/ 20    Time In: 8:50  Time Out: 10:15  Total Billable Time: 30 minutes    Precautions: Standard    Pattern of pain determined: 1 PEP      Subjective   Date of onset: 2/5/2020  History of current condition - Jhony reports: bilateral LE radiculopathy and back pain since 2015 with an insidious onset. Reports numbness to lateral right thigh as well as intermittent numbness in feet.      Medical History:   Past Medical History:   Diagnosis Date    Hypertension        Surgical History:   Jhony Matute  has a past surgical history that includes Transforaminal epidural injection of steroid (Right, 11/15/2019).    Medications:   Jhony has a current medication list which includes the following prescription(s): cyclobenzaprine, dextroamphetamine-amphetamine, gabapentin, hydrocodone-acetaminophen, nabumetone, omega-3 acid ethyl esters, ondansetron, and rosuvastatin.    Allergies:   Review of patient's allergies indicates:  No Known Allergies     Imaging, MRI studies:   T12-L1: Circumferential disc bulge with left foraminal zone protrusion resulting in mild left neural foraminal narrowing.    L1-L2:  Circumferential disc bulge results in mild right neural foraminal narrowing.    L2-L3: Circumferential disc bulge and mild facet arthropathy result in mild right neural foraminal narrowing.    L3-L4: Circumferential disc bulge and mild facet arthropathy noted.  No spinal canal stenosis or neural foraminal narrowing.    L4-L5: Circumferential disc bulge and mild facet arthropathy result in mild bilateral neural foraminal narrowing.    L5-S1: Mild facet arthropathy noted.  No spinal canal stenosis or neural foraminal narrowing.    Paraspinal muscles & soft tissues: Unremarkable.    Partially visualized abdominal organs are unremarkable.    Sacroiliac joints are unremarkable.  Small subcentimeter T1/T2 hypointense lesion in the right inferior iliac bone, favoring a bone island.       Impression         Mild multilevel degenerative changes of the lumbar spine resulting in mild neural foraminal narrowing.         Prior Therapy: Received PT without positive results in 2015  Prior Treatment: Received multiple injections in the past with mixed results.   Social History:  lives with their spouse  Occupation:  at [employer]  Leisure: Unable to participate in [employer] activites due to pain      Prior Level of Function: No functional deficits prior to onset of pain in 2015  Current Level of Function: Pain prevents his participation in community or recreational activities  DME owned/used: none        Pain:  Current 8/10, worst 10/10, best 5/10   Location: bilateral back , buttocks  and lower legs  Description: Numb, Sharp and Shooting  Aggravating Factors: Sitting, Bending and Walking  Easing Factors: relaxation  Disturbed Sleep: yes        Pattern of pain questions:  1.  Where is your pain the worst? Back and bilateral LE  2.  Is your pain constant or intermittent? constant  3.  Does bending forward make your typical pain worse? yes  4.  Since the start of your back pain, has there been a change in your bowel or  bladder? no  5.  What can't you do now that you use to be able to do? Bending, recreational duties      Pts goals: Perform occupational duties painfree      Red Flag Screening:   Cough  Sneeze  Strain: (--)  Bladder/ bowel: (--)  Falls: (--)  Night pain: (--)  Unexplained weight loss: (--)  General health: good    OBJECTIVE     Postural examination/scapula alignment: Abnormal trunk flexion, Slouched posture and Decreased lordosis  Joint integrity: Firm end feeling  Skin integrity: intact  Edema: none  Sitting: slouched posture  Standing: minimal trunk flexion  Correction of posture: no effect with lumbar roll    MOVEMENT LOSS    ROM Loss   Flexion moderate loss   Extension minimal loss   Side bending Right minimal loss   Side bending Left minimal loss   Rotation Right minimal loss   Rotation Left moderate loss     Lower Extremity Strength  Right LE  Left LE    Hip flexion: 4/5 Hip flexion: 4/5   Hip extension:  3+/5 Hip extension: 3+/5   Hip abduction: 3+/5 Hip abduction: 3+/5   Hip adduction:  4/5 Hip adduction:  4/5   Hip Internal rotation   4/5 Hip Internal rotation 4/5   Knee Flexion 4/5 Knee Flexion 4/5   Knee Extension 4+/5 Knee Extension 4+/5   Ankle dorsiflexion: 4+/5 Ankle dorsiflexion: 4+/5   Ankle plantarflexion: 4+/5 Ankle plantarflexion: 4+/5       GAIT:  Assistive Device used: none  Level of Assistance: independent  Patient displays the following gait deviations:  antalgic gait.     Special Tests:   Test Name  Test Result   Prone Instability Test (+)   SI Joint Provocation Test (+)   Straight Leg Raise (+)   Neural Tension Test (+)   Crossed Straight Leg Raise (--)   Walking on toes (--)   Walking on heels  (--)       NEUROLOGICAL SCREENING     Sensory deficit: numbness to right lateral thigh    Reflexes:    Left Right   Patella Tendon 2+ 2+   Achilles Tendon 2+ 2+   Babinski  (--) (--)   Clonus (--) (--)     REPEATED TEST MOVEMENTS:  Repeated Flexion in Standing better   Repeated Extension in  Standing no effect   Repeated Flexion in lying worse   Repeated Extension in lying  better       STATIC TESTS   Sitting slouched  worse   Sitting erect better   Standing slouched worse   Standing erect  better   Lying prone in extension  better   Long sitting   worse       Baseline Isometric Testing on Med X equipment: Testing administered by PT  Date of testin2020  ROM 0-36 deg   Max Peak Torque 277    Min Peak Torque 242    Flex/Ext Ratio 1.14   % above normative data 30%         CMS Impairment/Limitation/Restriction for FOTO Survey    Therapist reviewed FOTO scores for Jhony Matute on 2020.   FOTO documents entered into EPIC - see Media section.    Limitation Score: 61%  Category: Other    Current : CL = least 60% but < 80% impaired, limited or restricted  Goal: CK = at least 40% but < 60% impaired, limited or restricted  Discharge:        Oswestry: 37/50= 74%      Treatment   Treatment Time In: 9:45  Treatment Time Out: 10:15  Total Treatment time separate from Evaluation: 30 minutes      Jhony received therapeutic exercises to develop/improve posture, lumbar/cervical ROM, strength and muscular endurance for 30 minutes including the following exercises:     Med x dynamic exercise and baseline IM test  [facility] 2020   Visit Number 1   VAS Pain Rating 8   Extension in Lying 10   Flexion in Lying 10   Flexion in Sitting 10   Femur Restraint 6   Top Dead Center 24   Counterweight 412   Lumbar Flexion 36   Lumbar Extension 0   Lumbar Peak Torque 277   Min Torque 242   Ice - Z Lie (in min.) 10           Written Home Exercises Provided: yes.  Exercises were reviewed and Jhony was able to demonstrate them prior to the end of the session.  Jhony demonstrated good  understanding of the education provided.     See EMR under Media for exercises provided 2020.      Education provided:   - Patient received education regarding proper posture and body mechanics.  Patient was given top 10 tips  handout which discusses posture seated, standing, lifting correctly, components of exercise, importance of nutrition and hydration, and importance of sleep.  - Antonio roll tried, recommended, and purchase information was provided.    - Patient received a handout regarding anticipated muscular soreness following the isometric test and strategies for management were reviewed with patient including stretching, using ice and scheduled rest.   - Patient received education on the Healthy Back program, purpose of the isometric test, progression of back strengthening as well as wellness approach and systemic strengthening.  Details of the program were discussed.  Reviewed that patient should feel support/pressure from med ex restraints but no pain or discomfort and patient expressed understanding.    Jhony received cold pack for 10 minutes to back.    Assessment   Jhony is a 49 y.o. male referred to [facility] with a medical diagnosis of DDD with bilateral LE radiculopathy. Pt presents with point tenderness to L5-S1. Exhibits a guarded trunk with poor hamstring, IT band and piriformis flexibility as well as trunk ROM deficits in all planes. Patient has decreased strength in abdominals as well as bilateral hips. Reported reduced intensity of pain but same locations with repeated trunk extension.  Trunk extension strength is >age related norm. Pain prevents his ability to participate in occupational and recreational duties. Pain reduced from 8 to 6/10. Plans to have a spinal injection next week.    Pain Pattern: 1 PEP       Pt prognosis is Good.   Pt will benefit from skilled outpatient Physical Therapy to address the deficits stated above and in the chart below, provide pt/family education, and to maximize pt's level of independence. Based on the above history and physical examination an active physical therapy program is recommended.  Pt will continue to benefit from skilled outpatient physical therapy to address  the deficits listed below in the chart, provide pt/family education and to maximize pt's level of independence in the home and community environment. .       Plan of care discussed with patient: Yes  Pt's spiritual, cultural and educational needs considered and patient is agreeable to the plan of care and goals as stated below:     Anticipated Barriers for therapy: chronic pain    PT Evaluation Completed? Yes    Medical necessity is demonstrated by the following problem list.    Pt presents with the following impairments:     History  Co-morbidities and personal factors that may impact the plan of care Co-morbidities:   difficulty sleeping    Personal Factors:   no deficits     low   Examination  Body Structures and Functions, activity limitations and participation restrictions that may impact the plan of care Body Regions:   back  lower extremities    Body Systems:    gross symmetry  ROM  strength  gait    Participation Restrictions:   none    Activity limitations:   Learning and applying knowledge  no deficits    General Tasks and Commands  no deficits    Communication  no deficits    Mobility  lifting and carrying objects  walking    Self care  washing oneself (bathing, drying, washing hands)  caring for body parts (brushing teeth, shaving, grooming)    Domestic Life  shopping  cooking  doing house work (cleaning house, washing dishes, laundry)    Interactions/Relationships  no deficits    Life Areas  no deficits    Community and Social Life  community life  recreation and leisure         low   Clinical Presentation stable and uncomplicated low   Decision Making/ Complexity Score: low       GOALS: Pt is in agreement with the following goals.    Short term goals:  6 weeks or 10 visits   1.  Pt will demonstrate increased lumbar ROM by at least 3 degrees from the initial ROM value with improvements noted in functional ROM and ability to perform ADLs.  2.  Pt will demonstrate increased maximum isometric torque value  "by 5% when compared to the initial value resulting in improved ability to perform bending, lifting, and carrying activities safely, confidently.    3.  Patient report a reduction in worst pain score by 1-2 points for improved tolerance for washing his face.  4.  Pt able to perform HEP correctly with minimal cueing or supervision from therapist to encourage independent management of symptoms.       Long term goals: 10 weeks or 20 visits   1. Pt will demonstrate increased lumbar ROM by at least 6 degrees from initial ROM value, resulting in improved ability to perform functional fwd bending while standing and sitting.   2. Pt will demonstrate increased maximum isometric torque value by 10% when compared to the initial value resulting in improved ability to perform bending, lifting, and carrying activities safely, confidently.  3. Pt to demonstrate ability to independently control and reduce their pain through posture positioning and mechanical movements throughout a typical day.  4.  Pt will demonstrate reduced pain and improved functional outcomes as reported on the Oswestry Disability Index by reaching a score of 64 or less in order to demonstrate subjective improvement in pt's condition.    5. Pt will demonstrate independence with the HEP at discharge  6.  Patient will report ability to perform ADL's without pain increase.    Plan   Outpatient physical therapy 2x week for 10 weeks or 20 visits to include the following:   - Patient education  - Therapeutic exercise  - Manual therapy  - Performance testing   - Neuromuscular Re-education  - Therapeutic activity   - Modalities  -Dry needling    Pt may be seen by PTA as part of the rehabilitation team.     Therapist: Michael Mcdermott, PT  2/13/2020    "I certify the need for these services furnished under this plan of treatment and while under my care."    ____________________________________  Physician/Referring Practitioner    _______________  Date of " Signature

## 2020-02-18 ENCOUNTER — CLINICAL SUPPORT (OUTPATIENT)
Dept: REHABILITATION | Facility: HOSPITAL | Age: 50
End: 2020-02-18
Payer: COMMERCIAL

## 2020-02-18 DIAGNOSIS — R29.898 WEAKNESS OF BOTH HIPS: ICD-10-CM

## 2020-02-18 DIAGNOSIS — R29.898 IMPAIRED FLEXIBILITY OF LOWER EXTREMITY: ICD-10-CM

## 2020-02-18 DIAGNOSIS — M25.69 DECREASED ROM OF TRUNK AND BACK: ICD-10-CM

## 2020-02-18 PROCEDURE — 97110 THERAPEUTIC EXERCISES: CPT

## 2020-02-20 ENCOUNTER — CLINICAL SUPPORT (OUTPATIENT)
Dept: REHABILITATION | Facility: HOSPITAL | Age: 50
End: 2020-02-20
Payer: COMMERCIAL

## 2020-02-20 DIAGNOSIS — R29.898 WEAKNESS OF BOTH HIPS: ICD-10-CM

## 2020-02-20 DIAGNOSIS — M25.69 DECREASED ROM OF TRUNK AND BACK: ICD-10-CM

## 2020-02-20 DIAGNOSIS — R29.898 IMPAIRED FLEXIBILITY OF LOWER EXTREMITY: ICD-10-CM

## 2020-02-20 PROCEDURE — 97140 MANUAL THERAPY 1/> REGIONS: CPT

## 2020-02-20 PROCEDURE — 97110 THERAPEUTIC EXERCISES: CPT

## 2020-02-20 NOTE — PROGRESS NOTES
[facility]      Name: Jhony Matute  Clinic Number: [id number]    Therapy Diagnosis:   Encounter Diagnoses   Name Primary?    Weakness of both hips     Impaired flexibility of lower extremity     Decreased ROM of trunk and back      Physician: Hamzah Cade MD    Visit Date: 2020       Physician Orders: PT Eval and Treat   Medical Diagnosis from Referral:  M51.36 (ICD-10-CM) - DDD (degenerative disc disease), lumbar   M54.16 (ICD-10-CM) - Lumbar radiculopathy   G89.4 (ICD-10-CM) - Chronic pain disorder         Evaluation Date: 2020  Authorization Period Expiration: 2020  Plan of Care Expiration: 2020  Reassessment Due: 3/13/2020  Visit # / Visits authorized:       Time In: 1100   Time Out: 1200  Total Billable Time: 60 minutes    Precautions: Standard    Pattern of pain determined: 1 PEP    Subjective   Jhony reports improvement of symptoms.      Patient reports tolerating previous visit 2  Patient reports their pain to be 7/10 on a 0-10 scale with 0 being no pain and 10 being the worst pain imaginable.  Pain Location: bilateral low back, buttocks     Work and leisure:   Pt goals: Perform occupational duties painfree    Objective     Baseline IM Testing Results:   Date of testin2020  ROM 0-36 deg   Max Peak Torque 277    Min Peak Torque 242    Flex/Ext Ratio 1.14   % below normative data 30     Outcomes:  Initial score: CL = least 60% but < 80% impaired, limited or restricted  Visit 5 score:  Goal: CK = at least 40% but < 60% impaired, limited or restricted    [facility] 2020   Visit Number 2   VAS Pain Rating 7   Time 10   Extension in Lying -   Flexion in Lying -   Flexion in Sitting -   Femur Restraint 6   Top Dead Center 24   Counterweight 412   Lumbar Flexion 36   Lumbar Extension 0   Lumbar Peak Torque 277   Min Torque 242   Lumbar Weight 138   Repetitions 15   Rating of Perceived Exertion 8   Ice - Z Lie (in min.) -          Treatment    Pt was instructed in and performed the following:     Jhony received therapeutic exercises to develop/improved posture, cardiovascular endurance, muscular endurance, lumbar/cervical ROM, strength and muscular endurance for  minutes including the following exercises:   SKTC  LTR  Posterior pelvic tilt  Prone hip extension  Prone push ups  Piriformis stretch    Peripheral muscle strengthening which included 1 set of 15-20 repetitions at a slow, controlled 10-13 second per rep pace focused on strengthening supporting musculature for improved body mechanics and functional mobility.  Pt and therapist focused on proper form during treatment to ensure optimal strengthening of each targeted muscle group.  Machines were utilized including torso rotation, leg extension, leg curl, chest press, upright row. Tricep extension, bicep curl, leg press, and hip abduction added visit 3    Jhony received the following manual therapy techniques: Joint mobilizations, Manual traction and Soft tissue Mobilization were applied to the: lumbar, hip, and SI  for 0 minutes.         Home Exercises Provided and Patient Education Provided     Education provided:   - Review of home program    Written Home Exercises Provided: Patient instructed to cont prior HEP.  Exercises were reviewed and Jhony was able to demonstrate them prior to the end of the session.  Jhony demonstrated good  understanding of the education provided.     See EMR under Patient Instructions for exercises provided prior visit.          Assessment       Patient is making good progress towards established goals.  Pt will continue to benefit from skilled outpatient physical therapy to address the deficits stated in the impairment chart, provide pt/family education and to maximize pt's level of independence in the home and community environment.     Anticipated Barriers for therapy: none  Pt's spiritual, cultural and educational needs considered and pt agreeable to  plan of care and goals as stated below:             Goals:   Short term goals:  6 weeks or 10 visits   1.  Pt will demonstrate increased lumbar ROM by at least 3 degrees from the initial ROM value with improvements noted in functional ROM and ability to perform ADLs.  2.  Pt will demonstrate increased maximum isometric torque value by 5% when compared to the initial value resulting in improved ability to perform bending, lifting, and carrying activities safely, confidently.     3.  Patient report a reduction in worst pain score by 1-2 points for improved tolerance for washing his face.  4.  Pt able to perform HEP correctly with minimal cueing or supervision from therapist to encourage independent management of symptoms.         Long term goals: 10 weeks or 20 visits   1. Pt will demonstrate increased lumbar ROM by at least 6 degrees from initial ROM value, resulting in improved ability to perform functional fwd bending while standing and sitting.   2. Pt will demonstrate increased maximum isometric torque value by 10% when compared to the initial value resulting in improved ability to perform bending, lifting, and carrying activities safely, confidently.  3. Pt to demonstrate ability to independently control and reduce their pain through posture positioning and mechanical movements throughout a typical day.  4.  Pt will demonstrate reduced pain and improved functional outcomes as reported on the Oswestry Disability Index by reaching a score of 64 or less in order to demonstrate subjective improvement in pt's condition.    5. Pt will demonstrate independence with the HEP at discharge  6.  Patient will report ability to perform ADL's without pain increase.      Plan   Continue with established Plan of Care towards established PT goals.

## 2020-02-20 NOTE — PROGRESS NOTES
[facility]      Name: Jhony Matute  Clinic Number: [id number]    Therapy Diagnosis:   Encounter Diagnoses   Name Primary?    Weakness of both hips     Impaired flexibility of lower extremity     Decreased ROM of trunk and back      Physician: Hamzah Cade MD    Visit Date: 2020       Physician Orders: PT Eval and Treat   Medical Diagnosis from Referral:  M51.36 (ICD-10-CM) - DDD (degenerative disc disease), lumbar   M54.16 (ICD-10-CM) - Lumbar radiculopathy   G89.4 (ICD-10-CM) - Chronic pain disorder         Evaluation Date: 2020  Authorization Period Expiration: 2020  Plan of Care Expiration: 2020  Reassessment Due: 3/13/2020  Visit # / Visits authorized: 3/ 20      Time In: 11:30  Time Out: 12:32  Total Billable Time: 60 minutes    Precautions: Standard    Pattern of pain determined: 1 PEP    Subjective   Jhony reports soreness following previous visit with reduced leg pain    Patient reports tolerating previous visit with moderate soreness and reduced leg pain  Patient reports their pain to be 7/10 on a 0-10 scale with 0 being no pain and 10 being the worst pain imaginable.  Pain Location: bilateral low back, buttocks     Work and leisure:   Pt goals: Perform occupational duties painfree    Objective     Baseline IM Testing Results:   Date of testin2020  ROM 0-36 deg   Max Peak Torque 277    Min Peak Torque 242    Flex/Ext Ratio 1.14   % below normative data 30     Outcomes:  Initial score: CL = least 60% but < 80% impaired, limited or restricted  Visit 5 score:  Goal: CK = at least 40% but < 60% impaired, limited or restricted      Treatment    Pt was instructed in and performed the following:     Jhony received therapeutic exercises to develop/improved posture, cardiovascular endurance, muscular endurance, lumbar/cervical ROM, strength and muscular endurance for 40 minutes including the following exercises:   SKTC  LTR  Posterior pelvic tilt  with curl up  treadmill  Prone hip extension  Prone push ups  Piriformis stretch  SL hip ABD  Open book    [facility] 2/20/2020   Visit Number 3   VAS Pain Rating 7   Treadmill Time (in min.) 10   Speed 2   Time -   Extension in Lying 10   Flexion in Lying 10   Flexion in Sitting 10   Femur Restraint -   Top Dead Center -   Counterweight -   Lumbar Flexion -   Lumbar Extension -   Lumbar Peak Torque -   Min Torque -   Lumbar Weight 138   Repetitions 18   Rating of Perceived Exertion 5   Ice - Z Lie (in min.) -         Peripheral muscle strengthening which included 1 set of 15-20 repetitions at a slow, controlled 10-13 second per rep pace focused on strengthening supporting musculature for improved body mechanics and functional mobility.  Pt and therapist focused on proper form during treatment to ensure optimal strengthening of each targeted muscle group.  Machines were utilized including torso rotation, leg extension, leg curl, chest press, upright row. Tricep extension, bicep curl, leg press, and hip abduction added visit 3    Jhony received the following manual therapy techniques: Joint mobilizations, Manual traction and Soft tissue Mobilization were applied to the: lumbar, hip, and SI  for 14 minutes.         Home Exercises Provided and Patient Education Provided     Education provided:   - Review of home program    Written Home Exercises Provided: Patient instructed to cont prior HEP.  Exercises were reviewed and Jhony was able to demonstrate them prior to the end of the session.  Jhony demonstrated good  understanding of the education provided.     See EMR under Patient Instructions for exercises provided prior visit.          Assessment   Patient tolerated increased reps of same previous resistance with reduced RPE. Exhibits increased flexibility and trunk ROM. Reports reduced pain x 20%. Ambulating with improved gait mechanics. Will continue to progres as tolerated.    Patient is making good progress  towards established goals.  Pt will continue to benefit from skilled outpatient physical therapy to address the deficits stated in the impairment chart, provide pt/family education and to maximize pt's level of independence in the home and community environment.     Anticipated Barriers for therapy: none  Pt's spiritual, cultural and educational needs considered and pt agreeable to plan of care and goals as stated below:             Goals:   Short term goals:  6 weeks or 10 visits   1.  Pt will demonstrate increased lumbar ROM by at least 3 degrees from the initial ROM value with improvements noted in functional ROM and ability to perform ADLs.  2.  Pt will demonstrate increased maximum isometric torque value by 5% when compared to the initial value resulting in improved ability to perform bending, lifting, and carrying activities safely, confidently.     3.  Patient report a reduction in worst pain score by 1-2 points for improved tolerance for washing his face.  4.  Pt able to perform HEP correctly with minimal cueing or supervision from therapist to encourage independent management of symptoms.         Long term goals: 10 weeks or 20 visits   1. Pt will demonstrate increased lumbar ROM by at least 6 degrees from initial ROM value, resulting in improved ability to perform functional fwd bending while standing and sitting.   2. Pt will demonstrate increased maximum isometric torque value by 10% when compared to the initial value resulting in improved ability to perform bending, lifting, and carrying activities safely, confidently.  3. Pt to demonstrate ability to independently control and reduce their pain through posture positioning and mechanical movements throughout a typical day.  4.  Pt will demonstrate reduced pain and improved functional outcomes as reported on the Oswestry Disability Index by reaching a score of 64 or less in order to demonstrate subjective improvement in pt's condition.    5. Pt will  demonstrate independence with the HEP at discharge  6.  Patient will report ability to perform ADL's without pain increase.      Plan   Continue with established Plan of Care towards established PT goals.

## 2020-02-27 ENCOUNTER — HOSPITAL ENCOUNTER (OUTPATIENT)
Facility: OTHER | Age: 50
Discharge: HOME | End: 2020-02-27
Attending: ANESTHESIOLOGY | Admitting: ANESTHESIOLOGY
Payer: COMMERCIAL

## 2020-02-27 VITALS
HEART RATE: 74 BPM | SYSTOLIC BLOOD PRESSURE: 151 MMHG | TEMPERATURE: 98 F | RESPIRATION RATE: 18 BRPM | OXYGEN SATURATION: 97 % | BODY MASS INDEX: 33.13 KG/M2 | HEIGHT: 73 IN | DIASTOLIC BLOOD PRESSURE: 90 MMHG | WEIGHT: 250 LBS

## 2020-02-27 DIAGNOSIS — M54.16 LUMBAR RADICULOPATHY: ICD-10-CM

## 2020-02-27 DIAGNOSIS — G89.29 CHRONIC PAIN: ICD-10-CM

## 2020-02-27 DIAGNOSIS — M51.36 DDD (DEGENERATIVE DISC DISEASE), LUMBAR: Primary | ICD-10-CM

## 2020-02-27 PROCEDURE — 64484 NJX AA&/STRD TFRM EPI L/S EA: CPT | Mod: RT | Performed by: ANESTHESIOLOGY

## 2020-02-27 PROCEDURE — 64483 NJX AA&/STRD TFRM EPI L/S 1: CPT | Mod: RT,,, | Performed by: ANESTHESIOLOGY

## 2020-02-27 PROCEDURE — 64484 NJX AA&/STRD TFRM EPI L/S EA: CPT | Mod: RT,,, | Performed by: ANESTHESIOLOGY

## 2020-02-27 PROCEDURE — 99152 PR MOD CONSCIOUS SEDATION, SAME PHYS, 5+ YRS, FIRST 15 MIN: ICD-10-PCS | Mod: ,,, | Performed by: ANESTHESIOLOGY

## 2020-02-27 PROCEDURE — 25500020 PHARM REV CODE 255: Performed by: ANESTHESIOLOGY

## 2020-02-27 PROCEDURE — 63600175 PHARM REV CODE 636 W HCPCS: Performed by: ANESTHESIOLOGY

## 2020-02-27 PROCEDURE — 64483 PR EPIDURAL INJ, ANES/STEROID, TRANSFORAMINAL, LUMB/SACR, SNGL LEVL: ICD-10-PCS | Mod: RT,,, | Performed by: ANESTHESIOLOGY

## 2020-02-27 PROCEDURE — 64484 PRA INJECT ANES/STEROID FORAMEN LUMBAR/SACRAL W IMG GUIDE ,EA ADD LEVEL: ICD-10-PCS | Mod: RT,,, | Performed by: ANESTHESIOLOGY

## 2020-02-27 PROCEDURE — 25000003 PHARM REV CODE 250: Performed by: ANESTHESIOLOGY

## 2020-02-27 PROCEDURE — 99152 MOD SED SAME PHYS/QHP 5/>YRS: CPT | Mod: ,,, | Performed by: ANESTHESIOLOGY

## 2020-02-27 PROCEDURE — 64483 NJX AA&/STRD TFRM EPI L/S 1: CPT | Mod: RT | Performed by: ANESTHESIOLOGY

## 2020-02-27 RX ORDER — LIDOCAINE HYDROCHLORIDE 10 MG/ML
INJECTION INFILTRATION; PERINEURAL
Status: DISCONTINUED | OUTPATIENT
Start: 2020-02-27 | End: 2020-02-27 | Stop reason: HOSPADM

## 2020-02-27 RX ORDER — FENTANYL CITRATE 50 UG/ML
INJECTION, SOLUTION INTRAMUSCULAR; INTRAVENOUS
Status: DISCONTINUED | OUTPATIENT
Start: 2020-02-27 | End: 2020-02-27 | Stop reason: HOSPADM

## 2020-02-27 RX ORDER — SODIUM CHLORIDE 9 MG/ML
500 INJECTION, SOLUTION INTRAVENOUS CONTINUOUS
Status: DISCONTINUED | OUTPATIENT
Start: 2020-02-27 | End: 2020-02-27 | Stop reason: HOSPADM

## 2020-02-27 RX ORDER — DEXAMETHASONE SODIUM PHOSPHATE 10 MG/ML
INJECTION INTRAMUSCULAR; INTRAVENOUS
Status: DISCONTINUED | OUTPATIENT
Start: 2020-02-27 | End: 2020-02-27 | Stop reason: HOSPADM

## 2020-02-27 RX ORDER — MIDAZOLAM HYDROCHLORIDE 1 MG/ML
INJECTION INTRAMUSCULAR; INTRAVENOUS
Status: DISCONTINUED | OUTPATIENT
Start: 2020-02-27 | End: 2020-02-27 | Stop reason: HOSPADM

## 2020-02-27 RX ORDER — BUPIVACAINE HYDROCHLORIDE 2.5 MG/ML
INJECTION, SOLUTION EPIDURAL; INFILTRATION; INTRACAUDAL
Status: DISCONTINUED | OUTPATIENT
Start: 2020-02-27 | End: 2020-02-27 | Stop reason: HOSPADM

## 2020-02-27 NOTE — DISCHARGE INSTRUCTIONS

## 2020-02-27 NOTE — INTERVAL H&P NOTE
"HPI  Patient presenting for Procedure(s) (LRB):  INJECTION, STEROID, EPIDURAL, TRANSFORAMINAL APPROACH, L2-L3 AND L4-L5 (Right)     Patient on Anti-coagulation No    No health changes since previous encounter    Past Medical History:   Diagnosis Date    Hypertension      Past Surgical History:   Procedure Laterality Date    TRANSFORAMINAL EPIDURAL INJECTION OF STEROID Right 11/15/2019    Procedure: LUMBAR TRANSFORAMINAL RIGHT L4/5 AND L5/S1 TF [place];  Surgeon: Michael Montes MD;  Location: [facility];  Service: Pain Management;  Laterality: Right;  NEEDS CONSENT     Review of patient's allergies indicates:  No Known Allergies   Current Facility-Administered Medications   Medication    0.9%  NaCl infusion       PMHx, PSHx, Allergies, Medications reviewed in epic    ROS negative except pain complaints in HPI    OBJECTIVE:    BP (!) 147/71 (BP Location: Right arm, Patient Position: Lying)   Pulse 62   Temp 98 °F (36.7 °C) (Oral)   Resp 18   Ht 6' 1" (1.854 m)   Wt 113.4 kg (250 lb)   SpO2 98%   BMI 32.98 kg/m²     PHYSICAL EXAMINATION:    GENERAL: Well appearing, in no acute distress, alert and oriented x3.  PSYCH:  Mood and affect appropriate.  SKIN: Skin color, texture, turgor normal, no rashes or lesions which will impact the procedure.  CV: RRR with palpation of the radial artery.  PULM: No evidence of respiratory difficulty, symmetric chest rise. Clear to auscultation.  NEURO: Cranial nerves grossly intact.    Plan:    Proceed with procedure as planned Procedure(s) (LRB):  INJECTION, STEROID, EPIDURAL, TRANSFORAMINAL APPROACH, L2-L3 AND L4-L5 (Right)    Long Vasquez MD  02/27/2020              The patient has been examined and the H&P has been reviewed:    I concur with the findings and no changes have occurred since H&P was written.    Anesthesia/Surgery risks, benefits and alternative options discussed and understood by patient/family.          Active Hospital Problems    Diagnosis  POA    " Chronic pain [G89.29]  Yes      Resolved Hospital Problems   No resolved problems to display.

## 2020-02-27 NOTE — OP NOTE
INFORMED CONSENT: The procedure, risks, benefits and options were discussed with patient. There are no contraindications to the procedure. The patient expressed understanding and agreed to proceed. The personnel performing the procedure was discussed.    02/27/2020    Surgeon: Shon Brunner MD    Assistants: Long Vasquez MD    PROCEDURE:    1)  Right  L2/3 TRANSFORAMINAL EPIDURAL STEROID INJECTION    2)  Right  L4/5 TRANSFORAMINAL EPIDURAL STEROID INJECTION    Pre Procedure diagnosis:    Right  L2/3 and L4/5  Lumbar radiculopathy [M54.16]    Post-Procedure diagnosis:   same    Complications: None    Specimens: None      DESCRIPTION OF PROCEDURE: The patient was brought to the procedure room. IV access was obtained prior to the procedure. The patient was positioned prone on the fluoroscopy table. Continuous hemodynamic monitoring was initiated including blood pressure, EKG, and pulse oximetry. . The skin was prepped with chlorhexidine and draped in a sterile fashion. Skin anesthesia was achieved using a total of 10mL of lidocaine, 5mL over each respective injection site.     The  L2/3and L4/5 transforaminal spaces were identified with fluoroscopy in the  AP, oblique, and lateral views.  A 22 gauge spinal quinke needle was then advanced into the area of the trans foraminal spaces at the above levels with confirmation of proper needle position using AP, oblique, and lateral fluoroscopic views. Once the needle tip was in the area of the transforaminal space, and there was no blood, CSF or paraesthesias,  1 mL of Omnipaque 300mg/ml was injected on each side for a total of 2 mL.  Fluoroscopic imaging in the AP and lateral views revealed a clear outline of the spinal nerve with proximal spread of agent through the neural foramen into the epidural space. A total combination of 1 mL of Bupivicaine 0.25% and 40 mg depo medrol was injected into each level for a total of 4mL of injected medications with displacement of  the contrast dye confirming that the medication went into the area of the transforaminal spaces at each level. A sterile dressing was applied.   Patient tolerated the procedure well.    Conscious sedation provided by M.D    The patient was monitored with continuous pulse oximetry, EKG, and intermittent blood pressure monitors.  The patient was hemodynamically stable throughout the entire process was responsive to voice, and breathing spontaneously.  Supplemental O2 was provided at 2L/min via nasal cannula.  Patient was comfortable for the duration of the procedure. (See nurse documentation and case log for sedation time)    There was a total of 3mg IV Midazolam and 100mcg Fentanyl titrated for the procedure    Patient was taken back to the recovery room for further observation.     The patient was discharged to home in stable condition

## 2020-02-27 NOTE — DISCHARGE SUMMARY
Discharge Note  Short Stay      SUMMARY     Admit Date: 2/27/2020    Attending Physician: Long Vasquez      Discharge Physician: Long Vasquez      Discharge Date: 2/27/2020 3:19 PM    Procedure(s) (LRB):  INJECTION, STEROID, EPIDURAL, TRANSFORAMINAL APPROACH, L2-L3 AND L4-L5 (Right)    Final Diagnosis: Lumbar radiculopathy [M54.16]    Disposition: Home or self care    Patient Instructions:   Current Discharge Medication List      CONTINUE these medications which have NOT CHANGED    Details   cyclobenzaprine (FLEXERIL) 10 MG tablet       dextroamphetamine-amphetamine (ADDERALL) 15 mg tablet       gabapentin (NEURONTIN) 300 MG capsule Take 300 mg by mouth 3 (three) times daily.      HYDROcodone-acetaminophen (NORCO)  mg per tablet       nabumetone (RELAFEN) 500 MG tablet Take 500 mg by mouth 2 (two) times daily.      omega-3 acid ethyl esters (LOVAZA) 1 gram capsule       ondansetron (ZOFRAN-ODT) 8 MG TbDL       rosuvastatin (CRESTOR) 10 MG tablet Take 10 mg by mouth once daily.                 Discharge Diagnosis: Lumbar radiculopathy [M54.16]  Condition on Discharge: Stable with no complications to procedure   Diet on Discharge: Same as before.  Activity: as per instruction sheet.  Discharge to: Home with a responsible adult.  Follow up: 2-4 weeks       Please call my office or pager at [phone] if experienced any weakness or loss of sensation, fever > 101.5, pain uncontrolled with oral medications, persistent nausea/vomiting/or diarrhea, redness or drainage from the incisions, or any other worrisome concerns. If physician on call was not reached or could not communicate with our office for any reason please go to the nearest emergency department

## 2020-03-05 ENCOUNTER — CLINICAL SUPPORT (OUTPATIENT)
Dept: REHABILITATION | Facility: HOSPITAL | Age: 50
End: 2020-03-05
Payer: COMMERCIAL

## 2020-03-05 DIAGNOSIS — R29.898 IMPAIRED FLEXIBILITY OF LOWER EXTREMITY: ICD-10-CM

## 2020-03-05 DIAGNOSIS — M25.69 DECREASED ROM OF TRUNK AND BACK: ICD-10-CM

## 2020-03-05 DIAGNOSIS — R29.898 WEAKNESS OF BOTH HIPS: ICD-10-CM

## 2020-03-05 PROCEDURE — 97110 THERAPEUTIC EXERCISES: CPT

## 2020-03-05 NOTE — PROGRESS NOTES
[facility]      Name: Jhony Matute  Clinic Number: [id number]    Therapy Diagnosis:   Encounter Diagnoses   Name Primary?    Weakness of both hips     Impaired flexibility of lower extremity     Decreased ROM of trunk and back      Physician: Hamzah Cade MD    Visit Date: 3/5/2020       Physician Orders: PT Eval and Treat   Medical Diagnosis from Referral:  M51.36 (ICD-10-CM) - DDD (degenerative disc disease), lumbar   M54.16 (ICD-10-CM) - Lumbar radiculopathy   G89.4 (ICD-10-CM) - Chronic pain disorder         Evaluation Date: 2020  Authorization Period Expiration: 2020  Plan of Care Expiration: 2020  Reassessment Due: 3/13/2020  Visit # / Visits authorized: 3/ 20      Time In: 11:30  Time Out: 12:40  Total Billable Time: 60 minutes    Precautions: Standard    Pattern of pain determined: 1 PEP    Subjective   Jhony reports back pain has reduced but continues to report 7/10 pain  Patient reports tolerating previous visit with moderate soreness and reduced leg pain  Patient reports their pain to be 7/10 on a 0-10 scale with 0 being no pain and 10 being the worst pain imaginable.  Pain Location: bilateral low back, buttocks     Work and leisure:   Pt goals: Perform occupational duties painfree    Objective     Baseline IM Testing Results:   Date of testin2020  ROM 0-36 deg   Max Peak Torque 277    Min Peak Torque 242    Flex/Ext Ratio 1.14   % below normative data 30     Outcomes:  Initial score: CL = least 60% but < 80% impaired, limited or restricted  Visit 5 score:  Goal: CK = at least 40% but < 60% impaired, limited or restricted      Treatment    Pt was instructed in and performed the following:     Jhony received therapeutic exercises to develop/improved posture, cardiovascular endurance, muscular endurance, lumbar/cervical ROM, strength and muscular endurance for 60 minutes including the following exercises:   SKTC  LTR  Posterior pelvic tilt  with curl up  treadmill  Prone hip extension  Prone push ups  Piriformis stretch  SL hip ABD  Open book    [facility] Short 3/5/2020   Visit Number 4   VAS Pain Rating 7   Treadmill Time (in min.) 10   Speed 2   Time -   Extension in Lying 10   Flexion in Lying 10   Flexion in Sitting 10   Femur Restraint -   Top Dead Center -   Counterweight -   Lumbar Flexion -   Lumbar Extension -   Lumbar Peak Torque -   Lumbar Weight 138   Repetitions 12   Rating of Perceived Exertion 7       Peripheral muscle strengthening which included 1 set of 15-20 repetitions at a slow, controlled 10-13 second per rep pace focused on strengthening supporting musculature for improved body mechanics and functional mobility.  Pt and therapist focused on proper form during treatment to ensure optimal strengthening of each targeted muscle group.  Machines were utilized including torso rotation, leg extension, leg curl, chest press, upright row. Tricep extension, bicep curl, leg press, and hip abduction added visit 3    Jhony received the following manual therapy techniques: Joint mobilizations, Manual traction and Soft tissue Mobilization were applied to the: lumbar, hip, and SI  for 14 minutes.         Home Exercises Provided and Patient Education Provided     Education provided:   - Review of home program    Written Home Exercises Provided: Patient instructed to cont prior HEP.  Exercises were reviewed and Jhony was able to demonstrate them prior to the end of the session.  Jhony demonstrated good  understanding of the education provided.     See EMR under Patient Instructions for exercises provided prior visit.          Assessment   Patient limited this date due to noncompliance with scheduled visits. Last appt was 2-. Performed 138 ft-lbs for 12  Reps at 0-36 degrees ROM. Instructed patient to meet his desired goals he needs to attend as scheduled.     Patient is making good progress towards established goals.  Pt will  continue to benefit from skilled outpatient physical therapy to address the deficits stated in the impairment chart, provide pt/family education and to maximize pt's level of independence in the home and community environment.     Anticipated Barriers for therapy: none  Pt's spiritual, cultural and educational needs considered and pt agreeable to plan of care and goals as stated below:             Goals:   Short term goals:  6 weeks or 10 visits   1.  Pt will demonstrate increased lumbar ROM by at least 3 degrees from the initial ROM value with improvements noted in functional ROM and ability to perform ADLs.  2.  Pt will demonstrate increased maximum isometric torque value by 5% when compared to the initial value resulting in improved ability to perform bending, lifting, and carrying activities safely, confidently.     3.  Patient report a reduction in worst pain score by 1-2 points for improved tolerance for washing his face.  4.  Pt able to perform HEP correctly with minimal cueing or supervision from therapist to encourage independent management of symptoms.         Long term goals: 10 weeks or 20 visits   1. Pt will demonstrate increased lumbar ROM by at least 6 degrees from initial ROM value, resulting in improved ability to perform functional fwd bending while standing and sitting.   2. Pt will demonstrate increased maximum isometric torque value by 10% when compared to the initial value resulting in improved ability to perform bending, lifting, and carrying activities safely, confidently.  3. Pt to demonstrate ability to independently control and reduce their pain through posture positioning and mechanical movements throughout a typical day.  4.  Pt will demonstrate reduced pain and improved functional outcomes as reported on the Oswestry Disability Index by reaching a score of 64 or less in order to demonstrate subjective improvement in pt's condition.    5. Pt will demonstrate independence with the HEP at  discharge  6.  Patient will report ability to perform ADL's without pain increase.      Plan   Continue with established Plan of Care towards established PT goals.

## 2020-03-10 ENCOUNTER — CLINICAL SUPPORT (OUTPATIENT)
Dept: REHABILITATION | Facility: HOSPITAL | Age: 50
End: 2020-03-10
Payer: COMMERCIAL

## 2020-03-10 DIAGNOSIS — R29.898 IMPAIRED FLEXIBILITY OF LOWER EXTREMITY: ICD-10-CM

## 2020-03-10 DIAGNOSIS — M25.69 DECREASED ROM OF TRUNK AND BACK: ICD-10-CM

## 2020-03-10 DIAGNOSIS — R29.898 WEAKNESS OF BOTH HIPS: ICD-10-CM

## 2020-03-10 PROCEDURE — 97110 THERAPEUTIC EXERCISES: CPT

## 2020-03-11 ENCOUNTER — OFFICE VISIT (OUTPATIENT)
Dept: PAIN MEDICINE | Facility: CLINIC | Age: 50
End: 2020-03-11
Attending: ANESTHESIOLOGY
Payer: COMMERCIAL

## 2020-03-11 VITALS
WEIGHT: 253.31 LBS | TEMPERATURE: 99 F | BODY MASS INDEX: 33.57 KG/M2 | DIASTOLIC BLOOD PRESSURE: 89 MMHG | SYSTOLIC BLOOD PRESSURE: 155 MMHG | OXYGEN SATURATION: 100 % | HEART RATE: 66 BPM | HEIGHT: 73 IN | RESPIRATION RATE: 18 BRPM

## 2020-03-11 DIAGNOSIS — G89.4 CHRONIC PAIN SYNDROME: Primary | ICD-10-CM

## 2020-03-11 DIAGNOSIS — M51.36 DDD (DEGENERATIVE DISC DISEASE), LUMBAR: ICD-10-CM

## 2020-03-11 DIAGNOSIS — M54.16 LUMBAR RADICULOPATHY: ICD-10-CM

## 2020-03-11 PROCEDURE — 99999 PR PBB SHADOW E&M-EST. PATIENT-LVL IV: CPT | Mod: PBBFAC,,, | Performed by: ANESTHESIOLOGY

## 2020-03-11 PROCEDURE — 99214 OFFICE O/P EST MOD 30 MIN: CPT | Mod: S$GLB,,, | Performed by: ANESTHESIOLOGY

## 2020-03-11 PROCEDURE — 3008F PR BODY MASS INDEX (BMI) DOCUMENTED: ICD-10-PCS | Mod: CPTII,S$GLB,, | Performed by: ANESTHESIOLOGY

## 2020-03-11 PROCEDURE — 99999 PR PBB SHADOW E&M-EST. PATIENT-LVL IV: ICD-10-PCS | Mod: PBBFAC,,, | Performed by: ANESTHESIOLOGY

## 2020-03-11 PROCEDURE — 99214 PR OFFICE/OUTPT VISIT, EST, LEVL IV, 30-39 MIN: ICD-10-PCS | Mod: S$GLB,,, | Performed by: ANESTHESIOLOGY

## 2020-03-11 PROCEDURE — 3008F BODY MASS INDEX DOCD: CPT | Mod: CPTII,S$GLB,, | Performed by: ANESTHESIOLOGY

## 2020-03-11 RX ORDER — DULOXETIN HYDROCHLORIDE 20 MG/1
CAPSULE, DELAYED RELEASE ORAL
COMMUNITY
Start: 2020-02-12 | End: 2022-04-18

## 2020-03-11 RX ORDER — GABAPENTIN 300 MG/1
CAPSULE ORAL
Qty: 180 CAPSULE | Refills: 2 | Status: SHIPPED | OUTPATIENT
Start: 2020-03-11 | End: 2021-11-05

## 2020-03-11 NOTE — PROGRESS NOTES
[facility]      Name: Jhony Matute  Clinic Number: [id number]    Therapy Diagnosis:   Encounter Diagnoses   Name Primary?    Weakness of both hips     Impaired flexibility of lower extremity     Decreased ROM of trunk and back      Physician: Hamzah Cade MD    Visit Date: 3/10/2020       Physician Orders: PT Eval and Treat   Medical Diagnosis from Referral:  M51.36 (ICD-10-CM) - DDD (degenerative disc disease), lumbar   M54.16 (ICD-10-CM) - Lumbar radiculopathy   G89.4 (ICD-10-CM) - Chronic pain disorder         Evaluation Date: 2020  Authorization Period Expiration: 2020  Plan of Care Expiration: 2020  Reassessment Due: 3/13/2020  Visit # / Visits authorized:       Time In: 11:15  Time Out: 12:15  Total Billable Time: 60 minutes    Precautions: Standard    Pattern of pain determined: 1 PEP    Subjective   Jhony reports back pain has reduced but continues to report 7/10 pain. Denies performing HEP as recommended.  Patient reports tolerating previous visit with moderate soreness and reduced leg pain  Patient reports their pain to be 7/10 on a 0-10 scale with 0 being no pain and 10 being the worst pain imaginable.  Pain Location: bilateral low back, buttocks     Work and leisure:   Pt goals: Perform occupational duties painfree    Objective     Baseline IM Testing Results:   Date of testin2020  ROM 0-36 deg   Max Peak Torque 277    Min Peak Torque 242    Flex/Ext Ratio 1.14   % below normative data 30     Outcomes:  Initial score: CL = least 60% but < 80% impaired, limited or restricted  Visit 5 score:  Goal: CK = at least 40% but < 60% impaired, limited or restricted      Treatment    Pt was instructed in and performed the following:     Jhony received therapeutic exercises to develop/improved posture, cardiovascular endurance, muscular endurance, lumbar/cervical ROM, strength and muscular endurance for 60 minutes including the following  exercises:   SKTC  LTR  Posterior pelvic tilt with curl up  treadmill  Prone hip extension  Prone push ups  Piriformis stretch  SL hip ABD  Open book    [facility] 3/11/2020   Visit Number 5   VAS Pain Rating 7   Treadmill Time (in min.) 10   Speed 2   Time -   Extension in Lying 10   Flexion in Lying -   Flexion in Sitting -   Femur Restraint -   Top Dead Center -   Counterweight -   Lumbar Flexion -   Lumbar Extension -   Lumbar Peak Torque -   Min Torque -   Lumbar Weight 138   Repetitions 14   Rating of Perceived Exertion 5   Ice - Z Lie (in min.) -         Peripheral muscle strengthening which included 1 set of 15-20 repetitions at a slow, controlled 10-13 second per rep pace focused on strengthening supporting musculature for improved body mechanics and functional mobility.  Pt and therapist focused on proper form during treatment to ensure optimal strengthening of each targeted muscle group.  Machines were utilized including torso rotation, leg extension, leg curl, chest press, upright row. Tricep extension, bicep curl, leg press, and hip abduction added visit 3    Jhony received the following manual therapy techniques: Joint mobilizations, Manual traction and Soft tissue Mobilization were applied to the: lumbar, hip, and SI  for 14 minutes.         Home Exercises Provided and Patient Education Provided     Education provided:   - Review of home program    Written Home Exercises Provided: Patient instructed to cont prior HEP.  Exercises were reviewed and Jhony was able to demonstrate them prior to the end of the session.  Jhony demonstrated good  understanding of the education provided.     See EMR under Patient Instructions for exercises provided prior visit.          Assessment   Patient remains limited due to compliance with scheduled visits as well as decreased frequency of HEP. Would benefit from further flexibility and strength training to maximize trunk stability.   Will remain at current  resistance until completion of 20 reps.  Patient is making good progress towards established goals.  Pt will continue to benefit from skilled outpatient physical therapy to address the deficits stated in the impairment chart, provide pt/family education and to maximize pt's level of independence in the home and community environment.     Anticipated Barriers for therapy: none  Pt's spiritual, cultural and educational needs considered and pt agreeable to plan of care and goals as stated below:             Goals:   Short term goals:  6 weeks or 10 visits   1.  Pt will demonstrate increased lumbar ROM by at least 3 degrees from the initial ROM value with improvements noted in functional ROM and ability to perform ADLs.  2.  Pt will demonstrate increased maximum isometric torque value by 5% when compared to the initial value resulting in improved ability to perform bending, lifting, and carrying activities safely, confidently.     3.  Patient report a reduction in worst pain score by 1-2 points for improved tolerance for washing his face.  4.  Pt able to perform HEP correctly with minimal cueing or supervision from therapist to encourage independent management of symptoms.         Long term goals: 10 weeks or 20 visits   1. Pt will demonstrate increased lumbar ROM by at least 6 degrees from initial ROM value, resulting in improved ability to perform functional fwd bending while standing and sitting.   2. Pt will demonstrate increased maximum isometric torque value by 10% when compared to the initial value resulting in improved ability to perform bending, lifting, and carrying activities safely, confidently.  3. Pt to demonstrate ability to independently control and reduce their pain through posture positioning and mechanical movements throughout a typical day.  4.  Pt will demonstrate reduced pain and improved functional outcomes as reported on the Oswestry Disability Index by reaching a score of 64 or less in order to  demonstrate subjective improvement in pt's condition.    5. Pt will demonstrate independence with the HEP at discharge  6.  Patient will report ability to perform ADL's without pain increase.      Plan   Continue with established Plan of Care towards established PT goals.

## 2020-03-11 NOTE — PROGRESS NOTES
Chronic Pain - Follow Up    Referring Physician: No ref. provider found    Chief Complaint:   Chief Complaint   Patient presents with    Follow-up     2 weeks after injection        SUBJECTIVE: Disclaimer: This note has been generated using voice-recognition software. There may be typographical errors that have been missed during proof-reading  Interval history 03/11/2020:  Since previous encounter the patient is status post right-sided L2-3 and L4-5 transforaminal epidural steroid injections on 02/27/2020 he had about 30% improvement from the previous injection, he has also had some improvement from increasing his gabapentin to 300 mg t.i.d..  He does not have any side effects to this medication.  He continues to have weakness in his right lower extremity continues to have radicular pain symptoms, he is undergoing physical therapy without any worsening of his symptoms or significant improvement.  He needs paperwork filled out for FMLA for his with job.  Initial encounter:    Jhony Matute presents to the clinic for the evaluation of lower back pain. The pain started several eyars ago following no inciting event and symptoms have been unchanged.    Brief history:    Pain Description:    The pain is located in the lumbosacral area and radiates to the BLE to his heels. For several years he has had numbness of his Right lateral thigh. He endorses weakness in his thighs after prolonged sitting and sometimes feels he needs to prop himself up at his desk.     At BEST  8/10     At WORST  10/10 on the WORST day.      On average pain is rated as 10/10.     Today the pain is rated as 9/10    The pain is described as numbing, sharp and shooting      Symptoms interfere with daily activity, sleeping and work.     Exacerbating factors: Sitting, Bending and Walking.      Mitigating factors medications.     Patient denies night fever/night sweats, urinary incontinence, bowel incontinence and significant weight loss.  Patient  "denies any suicidal or homicidal ideations    Pain Medications:  Current:  -Hydrocodone tid prn; gabapentin 300 bid, nabumetone, flexeril    Tried in Past:  NSAIDs -nabumetone  TCA -Never  SNRI -Never  Anti-convulsants -gabapentin  Muscle Relaxants -flexeril  Opioids-norco    Physical Therapy/Home Exercise: yes (in  or ) "did not help much"     report:  Reviewed and consistent with medication use as prescribed.    Pain Procedures:   -11/15/19 R L4-5 and L5-S1 TFESI  - 3 other ESIs over past 3 years in [place]  2020-right L2-3 and L4-5 transforaminal epidural steroid    Chiropractor -never  Acupuncture - never  TENS unit -never  Spinal decompression -never  Joint replacement -never    Imagin20 MRI Lumbar   FINDINGS:  Alignment: Alignment is within normal limits.    Vertebrae: Vertebral body heights are well maintained with no evidence of fracture or marrow replacement process.    Discs: Multilevel disc desiccation with maintained disc heights.    Cord: Distal cord is normal in contour and signal intensity.  Conus terminates at L1.    Degenerative findings:    T12-L1: Circumferential disc bulge with left foraminal zone protrusion resulting in mild left neural foraminal narrowing.    L1-L2: Circumferential disc bulge results in mild right neural foraminal narrowing.    L2-L3: Circumferential disc bulge and mild facet arthropathy result in mild right neural foraminal narrowing.    L3-L4: Circumferential disc bulge and mild facet arthropathy noted.  No spinal canal stenosis or neural foraminal narrowing.    L4-L5: Circumferential disc bulge and mild facet arthropathy result in mild bilateral neural foraminal narrowing.    L5-S1: Mild facet arthropathy noted.  No spinal canal stenosis or neural foraminal narrowing.    Paraspinal muscles & soft tissues: Unremarkable.    Partially visualized abdominal organs are unremarkable.    Sacroiliac joints are unremarkable.  Small subcentimeter T1/T2 " hypointense lesion in the right inferior iliac bone, favoring a bone island.      Impression       Mild multilevel degenerative changes of the lumbar spine resulting in mild neural foraminal narrowing.     9/18/19 EMG BLE:  bilateral L4-S1 radiculopathy.      Past Medical History:   Diagnosis Date    Hypertension      Past Surgical History:   Procedure Laterality Date    TRANSFORAMINAL EPIDURAL INJECTION OF STEROID Right 11/15/2019    Procedure: LUMBAR TRANSFORAMINAL RIGHT L4/5 AND L5/S1 TF [place];  Surgeon: Michael Montes MD;  Location: [facility];  Service: Pain Management;  Laterality: Right;  NEEDS CONSENT    TRANSFORAMINAL EPIDURAL INJECTION OF STEROID Right 2/27/2020    Procedure: INJECTION, STEROID, EPIDURAL, TRANSFORAMINAL APPROACH, L2-L3 AND L4-L5;  Surgeon: Shon Brunner MD;  Location: [facility];  Service: Pain Management;  Laterality: Right;     Social History     Socioeconomic History    Marital status:      Spouse name: Not on file    Number of children: Not on file    Years of education: Not on file    Highest education level: Not on file   Occupational History    Not on file   Social Needs    Financial resource strain: Not on file    Food insecurity:     Worry: Not on file     Inability: Not on file    Transportation needs:     Medical: Not on file     Non-medical: Not on file   Tobacco Use    Smoking status: Never Smoker    Smokeless tobacco: Never Used   Substance and Sexual Activity    Alcohol use: No     Comment: socially    Drug use: No    Sexual activity: Yes     Partners: Female   Lifestyle    Physical activity:     Days per week: Not on file     Minutes per session: Not on file    Stress: Not on file   Relationships    Social connections:     Talks on phone: Not on file     Gets together: Not on file     Attends Worship service: Not on file     Active member of club or organization: Not on file     Attends meetings of clubs or organizations: Not on file      Relationship status: Not on file   Other Topics Concern    Not on file   Social History Narrative    Not on file     Family History   Problem Relation Age of Onset    Stroke Mother     Stroke Father     Heart attack Father        Review of patient's allergies indicates:  No Known Allergies    Current Outpatient Medications   Medication Sig    cyclobenzaprine (FLEXERIL) 10 MG tablet     dextroamphetamine-amphetamine (ADDERALL) 15 mg tablet     DULoxetine (CYMBALTA) 20 MG capsule     gabapentin (NEURONTIN) 300 MG capsule Take 300 mg by mouth 3 (three) times daily.    HYDROcodone-acetaminophen (NORCO)  mg per tablet     nabumetone (RELAFEN) 500 MG tablet Take 500 mg by mouth 2 (two) times daily.    omega-3 acid ethyl esters (LOVAZA) 1 gram capsule     rosuvastatin (CRESTOR) 10 MG tablet Take 10 mg by mouth once daily.    ondansetron (ZOFRAN-ODT) 8 MG TbDL      No current facility-administered medications for this visit.        REVIEW OF SYSTEMS:    GENERAL:  No weight loss, malaise or fevers.  HEENT:   No recent changes in vision or hearing  NECK:  Negative for lumps, no difficulty with swallowing.  RESPIRATORY:  Negative for cough, wheezing or shortness of breath, patient denies any recent URI.  CARDIOVASCULAR:  Negative for chest pain, leg swelling or palpitations.  GI:  Negative for abdominal discomfort, blood in stools or black stools or change in bowel habits.  MUSCULOSKELETAL:  See HPI.  SKIN:  Negative for lesions, rash, and itching.  PSYCH:  No mood disorder or recent psychosocial stressors.  Sleep disorder  HEMATOLOGY/LYMPHOLOGY:  Negative for prolonged bleeding, bruising easily or swollen nodes.  Patient is not currently taking any anti-coagulants  ENDO: No history of diabetes or thyroid dysfunction. gout  NEURO:   No history of headaches, syncope, paralysis, seizures or tremors.  All other reviewed and negative other than HPI.    OBJECTIVE:    BP (!) 155/89   Pulse 66   Temp 98.5 °F  "(36.9 °C)   Resp 18   Ht 6' 1" (1.854 m)   Wt 114.9 kg (253 lb 4.9 oz)   SpO2 100%   BMI 33.42 kg/m²     BMP  Lab Results   Component Value Date     01/15/2018    K 4.2 01/15/2018    CL 99 01/15/2018    CO2 28 01/15/2018    BUN 13 01/15/2018    CREATININE 1.2 01/15/2018    CALCIUM 9.7 01/15/2018    ANIONGAP 9 01/15/2018    ESTGFRAFRICA >60.0 01/15/2018    EGFRNONAA >60.0 01/15/2018       PHYSICAL EXAMINATION:    GENERAL: Well appearing, in no acute distress, alert and oriented x3.  PSYCH:  Mood and affect appropriate.  SKIN: Skin color, texture, turgor normal, no rashes or lesions.  HEAD/FACE:  Normocephalic, atraumatic. Cranial nerves grossly intact.  NECK: No pain to palpation over the cervical paraspinous muscles. Spurling Negative. No pain with neck flexion, extension, or lateral flexion.   CV: RRR with palpation of the radial artery.  PULM: No evidence of respiratory difficulty, symmetric chest rise.  GI:  Soft and non-tender.  BACK: +SLR bilaterally. No pain to palpation over the facet joints of the lumbar spine or spinous processes. Normal range of motion without pain reproduction.  EXTREMITIES: Peripheral joint ROM is full and pain free without obvious instability or laxity in all four extremities. No deformities, edema, or skin discoloration. Good capillary refill.  MUSCULOSKELETAL: Shoulder, hip, and knee provocative maneuvers are negative.  There is no pain with palpation over the sacroiliac joints bilaterally.  FABERs test is negative although causes axial back pain.  FADIRs test is negative.   Strength 4/5 Right EHL extension and thigh flexion. Otherwise strength is normal upper and lower extremity on left.  No atrophy or tone abnormalities are noted.  NEURO: Bilateral upper and lower extremity coordination and muscle stretch reflexes are physiologic and symmetric.  Plantar response are downgoing. No clonus.  Decreased sensation Right thigh above knee  GAIT: antalgic.    Creatinine 0.96 " 12/12/2019  ASSESSMENT: 49 y.o. year old male with pain, consistent with     Encounter Diagnoses   Name Primary?    Chronic pain syndrome Yes    DDD (degenerative disc disease), lumbar     Lumbar radiculopathy        PLAN:   - I have stressed the importance of physical activity and a home exercise plan to help with pain and improve health.    - Patient can continue with medications for now since they are providing benefits, using them appropriately, and without side effects.    - I will schedule the patient for a L5-S1 interlaminar epidural steroid injection in 2 weeks time after escalation of gabapentin    -escalate gabapentin to 1800 mg per day, dose escalation instructions were provided the patient a refill provided to the patient today.    -continue healthy back Program    -given his acute radiculopathy with weakness as the patient is continue not ago treatment he needs to continue to have work restrictions using FMLA paperwork was given to me from the patient today for his job this will be sent to our FMLA department.    The above plan and management options were discussed at length with patient. Patient is in agreement with the above and verbalized understanding. It will be communicated with the referring physician via electronic record, fax, or mail.    Shon Brunner  03/11/2020

## 2020-03-12 ENCOUNTER — CLINICAL SUPPORT (OUTPATIENT)
Dept: REHABILITATION | Facility: HOSPITAL | Age: 50
End: 2020-03-12
Payer: COMMERCIAL

## 2020-03-12 DIAGNOSIS — R29.898 WEAKNESS OF BOTH HIPS: ICD-10-CM

## 2020-03-12 DIAGNOSIS — M25.69 DECREASED ROM OF TRUNK AND BACK: ICD-10-CM

## 2020-03-12 DIAGNOSIS — R29.898 IMPAIRED FLEXIBILITY OF LOWER EXTREMITY: ICD-10-CM

## 2020-03-12 PROCEDURE — 97110 THERAPEUTIC EXERCISES: CPT

## 2020-03-12 NOTE — PROGRESS NOTES
[facility]      Name: Jhony Matute  Clinic Number: [id number]    Therapy Diagnosis:   Encounter Diagnoses   Name Primary?    Weakness of both hips     Impaired flexibility of lower extremity     Decreased ROM of trunk and back      Physician: Hamzah Cade MD    Visit Date: 3/12/2020       Physician Orders: PT Eval and Treat   Medical Diagnosis from Referral:  M51.36 (ICD-10-CM) - DDD (degenerative disc disease), lumbar   M54.16 (ICD-10-CM) - Lumbar radiculopathy   G89.4 (ICD-10-CM) - Chronic pain disorder         Evaluation Date: 2020  Authorization Period Expiration: 2020  Plan of Care Expiration: 2020  Reassessment Due: 3/13/2020  Visit # / Visits authorized:       Time In: 11:00  Time Out: 12:15  Total Billable Time: 60 minutes    Precautions: Standard    Pattern of pain determined: 1 PEP    Subjective   Jhony reports back pain has reduced . Pain reduced to 6/10 after treatment. Patient reports tolerating previous visit with moderate soreness and reduced leg pain  Patient reports their pain to be 7/10 on a 0-10 scale with 0 being no pain and 10 being the worst pain imaginable.  Pain Location: bilateral low back, buttocks     Work and leisure:   Pt goals: Perform occupational duties painfree    Objective     Baseline IM Testing Results:   Date of testin2020  ROM 0-36 deg   Max Peak Torque 277    Min Peak Torque 242    Flex/Ext Ratio 1.14   % below normative data 30     Outcomes:  Initial score: CL = least 60% but < 80% impaired, limited or restricted  Visit 5 score:  Goal: CK = at least 40% but < 60% impaired, limited or restricted      Treatment    Pt was instructed in and performed the following:     Jhony received therapeutic exercises to develop/improved posture, cardiovascular endurance, muscular endurance, lumbar/cervical ROM, strength and muscular endurance for 60 minutes including the following exercises:   SKTC  LTR  Posterior pelvic  tilt with curl up  treadmill  Prone hip extension  Prone push ups  Piriformis stretch  SL hip ABD  Open book  Dead lifts  Squat press  Rev lunge and curl    [facility] 3/12/2020   Visit Number 6   VAS Pain Rating 7   Treadmill Time (in min.) 10   Speed 2   Time -   Extension in Lying 10   Flexion in Lying -   Flexion in Sitting -   Femur Restraint -   Top Dead Center -   Counterweight -   Lumbar Flexion -   Lumbar Extension -   Lumbar Peak Torque -   Min Torque -   Lumbar Weight 138   Repetitions 11   Rating of Perceived Exertion 5   Ice - Z Lie (in min.) -         Peripheral muscle strengthening which included 1 set of 15-20 repetitions at a slow, controlled 10-13 second per rep pace focused on strengthening supporting musculature for improved body mechanics and functional mobility.  Pt and therapist focused on proper form during treatment to ensure optimal strengthening of each targeted muscle group.  Machines were utilized including torso rotation, leg extension, leg curl, chest press, upright row. Tricep extension, bicep curl, leg press, and hip abduction added visit 3    Jhony received the following manual therapy techniques: Joint mobilizations, Manual traction and Soft tissue Mobilization were applied to the: lumbar, hip, and SI  for 14 minutes.         Home Exercises Provided and Patient Education Provided     Education provided:   - Review of home program    Written Home Exercises Provided: Patient instructed to cont prior HEP.  Exercises were reviewed and Jhony was able to demonstrate them prior to the end of the session.  Jhony demonstrated good  understanding of the education provided.     See EMR under Patient Instructions for exercises provided prior visit.          Assessment   Patient participated in high intensity training this date with a reduction in pain. Patient is making good progress towards established goals. Educated patient on pain science and anxiety.    Pt will continue to benefit  from skilled outpatient physical therapy to address the deficits stated in the impairment chart, provide pt/family education and to maximize pt's level of independence in the home and community environment.     Anticipated Barriers for therapy: none  Pt's spiritual, cultural and educational needs considered and pt agreeable to plan of care and goals as stated below:             Goals:   Short term goals:  6 weeks or 10 visits   1.  Pt will demonstrate increased lumbar ROM by at least 3 degrees from the initial ROM value with improvements noted in functional ROM and ability to perform ADLs.  2.  Pt will demonstrate increased maximum isometric torque value by 5% when compared to the initial value resulting in improved ability to perform bending, lifting, and carrying activities safely, confidently.     3.  Patient report a reduction in worst pain score by 1-2 points for improved tolerance for washing his face.  4.  Pt able to perform HEP correctly with minimal cueing or supervision from therapist to encourage independent management of symptoms.         Long term goals: 10 weeks or 20 visits   1. Pt will demonstrate increased lumbar ROM by at least 6 degrees from initial ROM value, resulting in improved ability to perform functional fwd bending while standing and sitting.   2. Pt will demonstrate increased maximum isometric torque value by 10% when compared to the initial value resulting in improved ability to perform bending, lifting, and carrying activities safely, confidently.  3. Pt to demonstrate ability to independently control and reduce their pain through posture positioning and mechanical movements throughout a typical day.  4.  Pt will demonstrate reduced pain and improved functional outcomes as reported on the Oswestry Disability Index by reaching a score of 64 or less in order to demonstrate subjective improvement in pt's condition.    5. Pt will demonstrate independence with the HEP at discharge  6.   Patient will report ability to perform ADL's without pain increase.      Plan   Continue with established Plan of Care towards established PT goals.

## 2020-03-18 ENCOUNTER — TELEPHONE (OUTPATIENT)
Dept: PAIN MEDICINE | Facility: CLINIC | Age: 50
End: 2020-03-18

## 2020-03-19 ENCOUNTER — TELEPHONE (OUTPATIENT)
Dept: REHABILITATION | Facility: HOSPITAL | Age: 50
End: 2020-03-19

## 2020-03-19 NOTE — TELEPHONE ENCOUNTER
Patient: Jhony Matute  Date: 3/19/2020  Diagnosis: No diagnosis found.  MRN: [id number]    Spoke with patient due to therapy following updates regarding COVID-19 closely and taking every precaution to ensure the safety of our patients, staff and community.  In an abundance of caution and in an effort to help reduce risk and limit community spread, we have decided to temporarily postpone appointments for patients who may be at increased risk to attend in-person therapy or where therapy is not critically needed at this time. Plan of care and home exercise program were reviewed and patient has what they need to continue therapy at home. All patient questions were answered. Also stated to patient that we are exploring virtual methods of providing care and will be in touch over the next few weeks. Patient verbalized understanding to all.    Tay Weathers  3/19/2020

## 2020-04-07 ENCOUNTER — TELEPHONE (OUTPATIENT)
Dept: PAIN MEDICINE | Facility: CLINIC | Age: 50
End: 2020-04-07

## 2020-04-07 ENCOUNTER — OFFICE VISIT (OUTPATIENT)
Dept: PAIN MEDICINE | Facility: CLINIC | Age: 50
End: 2020-04-07
Attending: ANESTHESIOLOGY
Payer: COMMERCIAL

## 2020-04-07 ENCOUNTER — TELEPHONE (OUTPATIENT)
Dept: INTERNAL MEDICINE | Facility: CLINIC | Age: 50
End: 2020-04-07

## 2020-04-07 DIAGNOSIS — M51.36 DDD (DEGENERATIVE DISC DISEASE), LUMBAR: ICD-10-CM

## 2020-04-07 DIAGNOSIS — M54.16 LUMBAR RADICULOPATHY: Primary | ICD-10-CM

## 2020-04-07 PROCEDURE — 99213 PR OFFICE/OUTPT VISIT, EST, LEVL III, 20-29 MIN: ICD-10-PCS | Mod: 95,,, | Performed by: ANESTHESIOLOGY

## 2020-04-07 PROCEDURE — 99213 OFFICE O/P EST LOW 20 MIN: CPT | Mod: 95,,, | Performed by: ANESTHESIOLOGY

## 2020-04-07 RX ORDER — METHYLPREDNISOLONE 4 MG/1
TABLET ORAL
Qty: 1 PACKAGE | Refills: 0 | Status: SHIPPED | OUTPATIENT
Start: 2020-04-07 | End: 2020-04-28

## 2020-04-07 NOTE — TELEPHONE ENCOUNTER
Chronic Pain-Tele-Medicine-Established Note (Follow up visit)      The patient location is: home  The chief complaint leading to consultation is: leg pain  Visit type: Virtual visit with synchronous audio and video  Total time spent with patient: 25mins  Each patient to whom he or she provides medical services by telemedicine is:  (1) informed of the relationship between the physician and patient and the respective role of any other health care provider with respect to management of the patient; and (2) notified that he or she may decline to receive medical services by telemedicine and may withdraw from such care at any time.    Referring Physician: No ref. provider found    Chief Complaint:   No chief complaint on file.       SUBJECTIVE: Disclaimer: This note has been generated using voice-recognition software. There may be typographical errors that have been missed during proof-reading    Interval History 4/7/20:  Follow up for lumbar radicular pain. Patient's scheduled L5-S1 ILESI has been postponed due to covid outbreak.He continues to have lower back pain with bilateral radicular pain to his heels. His gabapentin was increased to 600 mg tid since last visit. He notices slight improvement in pain since increasing gabapentin. He also takes Norco 10 tid prn that is prescribed by his PCP. He is also taking Cymbalta 20 mg qday.     Interval history 03/11/2020:  Since previous encounter the patient is status post right-sided L2-3 and L4-5 transforaminal epidural steroid injections on 02/27/2020 he had about 30% improvement from the previous injection, he has also had some improvement from increasing his gabapentin to 300 mg t.i.d..  He does not have any side effects to this medication.  He continues to have weakness in his right lower extremity continues to have radicular pain symptoms, he is undergoing physical therapy without any worsening of his symptoms or significant improvement.  He needs paperwork filled out  "for FMLA for his with job. He stopped taking nabumetone because it did not help. He had started PT and he thinks it may have been helping but now he is doing home PT    Initial encounter:    Jhony Matute presents to the clinic for the evaluation of lower back pain. The pain started several eyars ago following no inciting event and symptoms have been unchanged.    Brief history:    Pain Description:    The pain is located in the lumbosacral area and radiates to the BLE to his heels. For several years he has had numbness of his Right lateral thigh. He endorses weakness in his thighs after prolonged sitting and sometimes feels he needs to prop himself up at his desk.     At BEST  8/10     At WORST  10/10 on the WORST day.      On average pain is rated as 10/10.     Today the pain is rated as 9/10    The pain is described as numbing, sharp and shooting      Symptoms interfere with daily activity, sleeping and work.     Exacerbating factors: Sitting, Bending and Walking.      Mitigating factors medications.     Patient denies night fever/night sweats, urinary incontinence, bowel incontinence and significant weight loss.  Patient denies any suicidal or homicidal ideations    Pain Medications:  Current:  -Hydrocodone tid prn; gabapentin 300 bid, nabumetone, flexeril    Tried in Past:  NSAIDs -nabumetone  TCA -Never  SNRI -Never  Anti-convulsants -gabapentin  Muscle Relaxants -flexeril  Opioids-norco    Physical Therapy/Home Exercise: yes (in  or ) "did not help much"     report:  Reviewed and consistent with medication use as prescribed.    Pain Procedures:   -11/15/19 R L4-5 and L5-S1 TFESI  - 3 other ESIs over past 3 years in [place]  2020-right L2-3 and L4-5 transforaminal epidural steroid    Chiropractor -never  Acupuncture - never  TENS unit -never  Spinal decompression -never  Joint replacement -never    Imagin20 MRI Lumbar   FINDINGS:  Alignment: Alignment is within normal " limits.    Vertebrae: Vertebral body heights are well maintained with no evidence of fracture or marrow replacement process.    Discs: Multilevel disc desiccation with maintained disc heights.    Cord: Distal cord is normal in contour and signal intensity.  Conus terminates at L1.    Degenerative findings:    T12-L1: Circumferential disc bulge with left foraminal zone protrusion resulting in mild left neural foraminal narrowing.    L1-L2: Circumferential disc bulge results in mild right neural foraminal narrowing.    L2-L3: Circumferential disc bulge and mild facet arthropathy result in mild right neural foraminal narrowing.    L3-L4: Circumferential disc bulge and mild facet arthropathy noted.  No spinal canal stenosis or neural foraminal narrowing.    L4-L5: Circumferential disc bulge and mild facet arthropathy result in mild bilateral neural foraminal narrowing.    L5-S1: Mild facet arthropathy noted.  No spinal canal stenosis or neural foraminal narrowing.    Paraspinal muscles & soft tissues: Unremarkable.    Partially visualized abdominal organs are unremarkable.    Sacroiliac joints are unremarkable.  Small subcentimeter T1/T2 hypointense lesion in the right inferior iliac bone, favoring a bone island.      Impression       Mild multilevel degenerative changes of the lumbar spine resulting in mild neural foraminal narrowing.     9/18/19 EMG BLE:  bilateral L4-S1 radiculopathy.      Past Medical History:   Diagnosis Date    Hypertension      Past Surgical History:   Procedure Laterality Date    TRANSFORAMINAL EPIDURAL INJECTION OF STEROID Right 11/15/2019    Procedure: LUMBAR TRANSFORAMINAL RIGHT L4/5 AND L5/S1 TF [place];  Surgeon: Michael Montes MD;  Location: [facility];  Service: Pain Management;  Laterality: Right;  NEEDS CONSENT    TRANSFORAMINAL EPIDURAL INJECTION OF STEROID Right 2/27/2020    Procedure: INJECTION, STEROID, EPIDURAL, TRANSFORAMINAL APPROACH, L2-L3 AND L4-L5;  Surgeon: Shon POLANCO  MD Myesha;  Location: [facility], [place], operated by [facility];  Service: Pain Management;  Laterality: Right;     Social History     Socioeconomic History    Marital status:      Spouse name: Not on file    Number of children: Not on file    Years of education: Not on file    Highest education level: Not on file   Occupational History    Not on file   Social Needs    Financial resource strain: Not on file    Food insecurity:     Worry: Not on file     Inability: Not on file    Transportation needs:     Medical: Not on file     Non-medical: Not on file   Tobacco Use    Smoking status: Never Smoker    Smokeless tobacco: Never Used   Substance and Sexual Activity    Alcohol use: No     Comment: socially    Drug use: No    Sexual activity: Yes     Partners: Female   Lifestyle    Physical activity:     Days per week: Not on file     Minutes per session: Not on file    Stress: Not on file   Relationships    Social connections:     Talks on phone: Not on file     Gets together: Not on file     Attends Latter day service: Not on file     Active member of club or organization: Not on file     Attends meetings of clubs or organizations: Not on file     Relationship status: Not on file   Other Topics Concern    Not on file   Social History Narrative    Not on file     Family History   Problem Relation Age of Onset    Stroke Mother     Stroke Father     Heart attack Father        Review of patient's allergies indicates:  No Known Allergies    Current Outpatient Medications   Medication Sig    cyclobenzaprine (FLEXERIL) 10 MG tablet     dextroamphetamine-amphetamine (ADDERALL) 15 mg tablet     DULoxetine (CYMBALTA) 20 MG capsule     gabapentin (NEURONTIN) 300 MG capsule Take 300 mg by mouth 3 (three) times daily.    gabapentin (NEURONTIN) 300 MG capsule Increase to 2 tablets at night, 1 in AM and 1 in afternoon for 3 days.  Then increase to 2 tablets at night and 2 in morning, 1 tablet in afternoon for 3 days.  Then  "increase to 2 tablets three times a day (1800mg)    HYDROcodone-acetaminophen (NORCO)  mg per tablet     nabumetone (RELAFEN) 500 MG tablet Take 500 mg by mouth 2 (two) times daily.    omega-3 acid ethyl esters (LOVAZA) 1 gram capsule     ondansetron (ZOFRAN-ODT) 8 MG TbDL     rosuvastatin (CRESTOR) 10 MG tablet Take 10 mg by mouth once daily.     No current facility-administered medications for this visit.        REVIEW OF SYSTEMS:    GENERAL:  No weight loss, malaise or fevers.  HEENT:   No recent changes in vision or hearing  NECK:  Negative for lumps, no difficulty with swallowing.  RESPIRATORY:  Negative for cough, wheezing or shortness of breath, patient denies any recent URI.  CARDIOVASCULAR:  Negative for chest pain, leg swelling or palpitations.  GI:  Negative for abdominal discomfort, blood in stools or black stools or change in bowel habits.  MUSCULOSKELETAL:  See HPI.  SKIN:  Negative for lesions, rash, and itching.  PSYCH:  No mood disorder or recent psychosocial stressors.  Sleep disorder  HEMATOLOGY/LYMPHOLOGY:  Negative for prolonged bleeding, bruising easily or swollen nodes.  Patient is not currently taking any anti-coagulants  ENDO: No history of diabetes or thyroid dysfunction. gout  NEURO:   No history of headaches, syncope, paralysis, seizures or tremors.  All other reviewed and negative other than HPI.    OBJECTIVE:    BP (!) 155/89   Pulse 66   Temp 98.5 °F (36.9 °C)   Resp 18   Ht 6' 1" (1.854 m)   Wt 114.9 kg (253 lb 4.9 oz)   SpO2 100%   BMI 33.42 kg/m²     BMP  Lab Results   Component Value Date     01/15/2018    K 4.2 01/15/2018    CL 99 01/15/2018    CO2 28 01/15/2018    BUN 13 01/15/2018    CREATININE 1.2 01/15/2018    CALCIUM 9.7 01/15/2018    ANIONGAP 9 01/15/2018    ESTGFRAFRICA >60.0 01/15/2018    EGFRNONAA >60.0 01/15/2018       PHYSICAL EXAMINATION:    FINDINGS BELOW ARE FROM PREVIOUS ENCOUNTER, AS THIS ENCOUNTER VIA TELEHEALTH VISIT    GENERAL: Well " appearing, in no acute distress, alert and oriented x3.  PSYCH:  Mood and affect appropriate.  SKIN: Skin color, texture, turgor normal, no rashes or lesions.  HEAD/FACE:  Normocephalic, atraumatic. Cranial nerves grossly intact.  NECK: No pain to palpation over the cervical paraspinous muscles. Spurling Negative. No pain with neck flexion, extension, or lateral flexion.   CV: RRR with palpation of the radial artery.  PULM: No evidence of respiratory difficulty, symmetric chest rise.  GI:  Soft and non-tender.  BACK: +SLR bilaterally. No pain to palpation over the facet joints of the lumbar spine or spinous processes. Normal range of motion without pain reproduction.  EXTREMITIES: Peripheral joint ROM is full and pain free without obvious instability or laxity in all four extremities. No deformities, edema, or skin discoloration. Good capillary refill.  MUSCULOSKELETAL: Shoulder, hip, and knee provocative maneuvers are negative.  There is no pain with palpation over the sacroiliac joints bilaterally.  FABERs test is negative although causes axial back pain.  FADIRs test is negative.   Strength 4/5 Right EHL extension and thigh flexion. Otherwise strength is normal upper and lower extremity on left.  No atrophy or tone abnormalities are noted.  NEURO: Bilateral upper and lower extremity coordination and muscle stretch reflexes are physiologic and symmetric.  Plantar response are downgoing. No clonus.  Decreased sensation Right thigh above knee  GAIT: antalgic.    Creatinine 0.96 12/12/2019  ASSESSMENT: 49 y.o. year old male with pain, consistent with     Encounter Diagnoses   Name Primary?    Lumbar radiculopathy Yes    DDD (degenerative disc disease), lumbar        PLAN:     - I have stressed the importance of physical activity and a home exercise plan to help with pain and improve health.    - Patient can continue with medications for now since they are providing benefits, using them appropriately, and without  side effects.    - Consider medrol dosepack if pain worsens prior to follow up    - Plan to s/f  L5-S1 ILESI when procedures resume    - Continue Gabapentin 600 mg tid and Cymbalta 20 mg qday    - Consider increasing Gabapentin or Cymbalta at next visit    - Resume Healthy Back Program when possible    - Follow up in 1 Month    The above plan and management options were discussed at length with patient. Patient is in agreement with the above and verbalized understanding. It will be communicated with the referring physician via electronic record, fax, or mail.    Hamzah Cade  04/07/2020

## 2020-04-07 NOTE — PATIENT INSTRUCTIONS
Exercise guide:    https://order.bethel.nih.gov/sites/default/files/[email or web address]/bethel-exercise-guide.pdf

## 2020-04-07 NOTE — PROGRESS NOTES
Chronic Pain-Tele-Medicine-Established Note (Follow up visit)        The patient location is: home  The chief complaint leading to consultation is: leg pain  Visit type: Virtual visit with synchronous audio and video  Total time spent with patient: 25mins  Each patient to whom he or she provides medical services by telemedicine is:  (1) informed of the relationship between the physician and patient and the respective role of any other health care provider with respect to management of the patient; and (2) notified that he or she may decline to receive medical services by telemedicine and may withdraw from such care at any time.     Referring Physician: No ref. provider found     Chief Complaint:   No chief complaint on file.                   SUBJECTIVE: Disclaimer: This note has been generated using voice-recognition software. There may be typographical errors that have been missed during proof-reading     Interval History 4/7/20:  Follow up for lumbar radicular pain. Patient's scheduled L5-S1 ILESI has been postponed due to covid outbreak.He continues to have lower back pain with bilateral radicular pain to his heels. His gabapentin was increased to 600 mg tid since last visit. He notices slight improvement in pain since increasing gabapentin. He also takes Norco 10 tid prn that is prescribed by his PCP. He is also taking Cymbalta 20 mg qday.      Interval history 03/11/2020:  Since previous encounter the patient is status post right-sided L2-3 and L4-5 transforaminal epidural steroid injections on 02/27/2020 he had about 30% improvement from the previous injection, he has also had some improvement from increasing his gabapentin to 300 mg t.i.d..  He does not have any side effects to this medication.  He continues to have weakness in his right lower extremity continues to have radicular pain symptoms, he is undergoing physical therapy without any worsening of his symptoms or significant improvement.   "He needs paperwork filled out for FMLA for his with job. He stopped taking nabumetone because it did not help. He had started PT and he thinks it may have been helping but now he is doing home PT     Initial encounter:     Jhony Matute presents to the clinic for the evaluation of lower back pain. The pain started several eyars ago following no inciting event and symptoms have been unchanged.     Brief history:     Pain Description:     The pain is located in the lumbosacral area and radiates to the BLE to his heels. For several years he has had numbness of his Right lateral thigh. He endorses weakness in his thighs after prolonged sitting and sometimes feels he needs to prop himself up at his desk.      At BEST  8/10      At WORST  10/10 on the WORST day.       On average pain is rated as 10/10.      Today the pain is rated as 9/10     The pain is described as numbing, sharp and shooting       Symptoms interfere with daily activity, sleeping and work.      Exacerbating factors: Sitting, Bending and Walking.       Mitigating factors medications.      Patient denies night fever/night sweats, urinary incontinence, bowel incontinence and significant weight loss.  Patient denies any suicidal or homicidal ideations     Pain Medications:  Current:  -Hydrocodone tid prn; gabapentin 300 bid, nabumetone, flexeril     Tried in Past:  NSAIDs -nabumetone  TCA -Never  SNRI -Never  Anti-convulsants -gabapentin  Muscle Relaxants -flexeril  Opioids-norco     Physical Therapy/Home Exercise: yes (in  or ) "did not help much"      report:  Reviewed and consistent with medication use as prescribed.     Pain Procedures:   -11/15/19 R L4-5 and L5-S1 TFESI  - 3 other ESIs over past 3 years in [place]  2020-right L2-3 and L4-5 transforaminal epidural steroid     Chiropractor -never  Acupuncture - never  TENS unit -never  Spinal decompression -never  Joint replacement -never     Imagin20 MRI Lumbar      "   FINDINGS:  Alignment: Alignment is within normal limits.    Vertebrae: Vertebral body heights are well maintained with no evidence of fracture or marrow replacement process.    Discs: Multilevel disc desiccation with maintained disc heights.    Cord: Distal cord is normal in contour and signal intensity.  Conus terminates at L1.    Degenerative findings:    T12-L1: Circumferential disc bulge with left foraminal zone protrusion resulting in mild left neural foraminal narrowing.    L1-L2: Circumferential disc bulge results in mild right neural foraminal narrowing.    L2-L3: Circumferential disc bulge and mild facet arthropathy result in mild right neural foraminal narrowing.    L3-L4: Circumferential disc bulge and mild facet arthropathy noted.  No spinal canal stenosis or neural foraminal narrowing.    L4-L5: Circumferential disc bulge and mild facet arthropathy result in mild bilateral neural foraminal narrowing.    L5-S1: Mild facet arthropathy noted.  No spinal canal stenosis or neural foraminal narrowing.    Paraspinal muscles & soft tissues: Unremarkable.    Partially visualized abdominal organs are unremarkable.    Sacroiliac joints are unremarkable.  Small subcentimeter T1/T2 hypointense lesion in the right inferior iliac bone, favoring a bone island.       Impression         Mild multilevel degenerative changes of the lumbar spine resulting in mild neural foraminal narrowing.      9/18/19 EMG BLE:  bilateral L4-S1 radiculopathy.             Past Medical History:   Diagnosis Date    Hypertension              Past Surgical History:   Procedure Laterality Date    TRANSFORAMINAL EPIDURAL INJECTION OF STEROID Right 11/15/2019     Procedure: LUMBAR TRANSFORAMINAL RIGHT L4/5 AND L5/S1 TF [place];  Surgeon: Michael Montes MD;  [facility];  Service: Pain Management;  Laterality: Right;  NEEDS CONSENT    TRANSFORAMINAL EPIDURAL INJECTION OF STEROID Right 2/27/2020     Procedure: INJECTION, STEROID,  EPIDURAL, TRANSFORAMINAL APPROACH, L2-L3 AND L4-L5;  Surgeon: Shon Brunner MD;  Location: [facility];  Service: Pain Management;  Laterality: Right;      Social History               Socioeconomic History    Marital status:        Spouse name: Not on file    Number of children: Not on file    Years of education: Not on file    Highest education level: Not on file   Occupational History    Not on file   Social Needs    Financial resource strain: Not on file    Food insecurity:       Worry: Not on file       Inability: Not on file    Transportation needs:       Medical: Not on file       Non-medical: Not on file   Tobacco Use    Smoking status: Never Smoker    Smokeless tobacco: Never Used   Substance and Sexual Activity    Alcohol use: No       Comment: socially    Drug use: No    Sexual activity: Yes       Partners: Female   Lifestyle    Physical activity:       Days per week: Not on file       Minutes per session: Not on file    Stress: Not on file   Relationships    Social connections:       Talks on phone: Not on file       Gets together: Not on file       Attends Bahai service: Not on file       Active member of club or organization: Not on file       Attends meetings of clubs or organizations: Not on file       Relationship status: Not on file   Other Topics Concern    Not on file   Social History Narrative    Not on file               Family History   Problem Relation Age of Onset    Stroke Mother      Stroke Father      Heart attack Father           Review of patient's allergies indicates:  No Known Allergies          Current Outpatient Medications   Medication Sig    cyclobenzaprine (FLEXERIL) 10 MG tablet      dextroamphetamine-amphetamine (ADDERALL) 15 mg tablet      DULoxetine (CYMBALTA) 20 MG capsule      gabapentin (NEURONTIN) 300 MG capsule Take 300 mg by mouth 3 (three) times daily.    gabapentin (NEURONTIN) 300 MG capsule Increase to 2 tablets at night, 1  "in AM and 1 in afternoon for 3 days.  Then increase to 2 tablets at night and 2 in morning, 1 tablet in afternoon for 3 days.  Then increase to 2 tablets three times a day (1800mg)    HYDROcodone-acetaminophen (NORCO)  mg per tablet      nabumetone (RELAFEN) 500 MG tablet Take 500 mg by mouth 2 (two) times daily.    omega-3 acid ethyl esters (LOVAZA) 1 gram capsule      ondansetron (ZOFRAN-ODT) 8 MG TbDL      rosuvastatin (CRESTOR) 10 MG tablet Take 10 mg by mouth once daily.      No current facility-administered medications for this visit.          REVIEW OF SYSTEMS:     GENERAL:  No weight loss, malaise or fevers.  HEENT:   No recent changes in vision or hearing  NECK:  Negative for lumps, no difficulty with swallowing.  RESPIRATORY:  Negative for cough, wheezing or shortness of breath, patient denies any recent URI.  CARDIOVASCULAR:  Negative for chest pain, leg swelling or palpitations.  GI:  Negative for abdominal discomfort, blood in stools or black stools or change in bowel habits.  MUSCULOSKELETAL:  See HPI.  SKIN:  Negative for lesions, rash, and itching.  PSYCH:  No mood disorder or recent psychosocial stressors.  Sleep disorder  HEMATOLOGY/LYMPHOLOGY:  Negative for prolonged bleeding, bruising easily or swollen nodes.  Patient is not currently taking any anti-coagulants  ENDO: No history of diabetes or thyroid dysfunction. gout  NEURO:   No history of headaches, syncope, paralysis, seizures or tremors.  All other reviewed and negative other than HPI.     OBJECTIVE:     BP (!) 155/89   Pulse 66   Temp 98.5 °F (36.9 °C)   Resp 18   Ht 6' 1" (1.854 m)   Wt 114.9 kg (253 lb 4.9 oz)   SpO2 100%   BMI 33.42 kg/m²      BMP        Lab Results   Component Value Date      01/15/2018     K 4.2 01/15/2018     CL 99 01/15/2018     CO2 28 01/15/2018     BUN 13 01/15/2018     CREATININE 1.2 01/15/2018     CALCIUM 9.7 01/15/2018     ANIONGAP 9 01/15/2018     ESTGFRAFRICA >60.0 01/15/2018     " EGFRNONAA >60.0 01/15/2018         PHYSICAL EXAMINATION:     FINDINGS BELOW ARE FROM PREVIOUS ENCOUNTER, AS THIS ENCOUNTER VIA TELEHEALTH VISIT     GENERAL: Well appearing, in no acute distress, alert and oriented x3.  PSYCH:  Mood and affect appropriate.  SKIN: Skin color, texture, turgor normal, no rashes or lesions.  HEAD/FACE:  Normocephalic, atraumatic. Cranial nerves grossly intact.  NECK: No pain to palpation over the cervical paraspinous muscles. Spurling Negative. No pain with neck flexion, extension, or lateral flexion.   CV: RRR with palpation of the radial artery.  PULM: No evidence of respiratory difficulty, symmetric chest rise.  GI:  Soft and non-tender.  BACK: +SLR bilaterally. No pain to palpation over the facet joints of the lumbar spine or spinous processes. Normal range of motion without pain reproduction.  EXTREMITIES: Peripheral joint ROM is full and pain free without obvious instability or laxity in all four extremities. No deformities, edema, or skin discoloration. Good capillary refill.  MUSCULOSKELETAL: Shoulder, hip, and knee provocative maneuvers are negative.  There is no pain with palpation over the sacroiliac joints bilaterally.  FABERs test is negative although causes axial back pain.  FADIRs test is negative.   Strength 4/5 Right EHL extension and thigh flexion. Otherwise strength is normal upper and lower extremity on left.  No atrophy or tone abnormalities are noted.  NEURO: Bilateral upper and lower extremity coordination and muscle stretch reflexes are physiologic and symmetric.  Plantar response are downgoing. No clonus.  Decreased sensation Right thigh above knee  GAIT: antalgic.     Creatinine 0.96 12/12/2019  ASSESSMENT: 49 y.o. year old male with pain, consistent with        Encounter Diagnoses   Name Primary?    Lumbar radiculopathy Yes    DDD (degenerative disc disease), lumbar       PLAN:      - I have stressed the importance of physical activity and a home exercise  plan to help with pain and improve health.     - Patient can continue with medications for now since they are providing benefits, using them appropriately, and without side effects.     - Consider medrol dosepack if pain worsens prior to follow up - a prescription was sent to his pharmacy     - Plan to s/f  L5-S1 ILESI when procedures resume     - Continue Gabapentin 600 mg tid and Cymbalta 20 mg qday     - Consider increasing Gabapentin or Cymbalta at next visit     - Resume Healthy Back Program when possible     - Follow up in 1 Month     The above plan and management options were discussed at length with patient. Patient is in agreement with the above and verbalized understanding. It will be communicated with the referring physician via electronic record, fax, or mail.     Hamzah Cade  04/07/2020     I have personally reviewed the phone encounter with resident/fellow/NP and personally spoke with patient for over 5 min after addressing all questions and concerns   The phone call was initiated by patient who consented and verbalized understanding to the type of encounter not related to any office visit or other encounter in the past 7 days    Shon Brunner MD 04/07/2020

## 2020-04-27 ENCOUNTER — TELEPHONE (OUTPATIENT)
Dept: PAIN MEDICINE | Facility: CLINIC | Age: 50
End: 2020-04-27

## 2020-05-13 NOTE — PROGRESS NOTES
[facility]      Name: Jhony Matute  Clinic Number: [id number]    Therapy Diagnosis:   Encounter Diagnoses   Name Primary?    Weakness of both hips     Impaired flexibility of lower extremity     Decreased ROM of trunk and back      Physician: Hamzah Cade MD    Visit Date: 2020       Physician Orders: PT Eval and Treat   Medical Diagnosis from Referral:  M51.36 (ICD-10-CM) - DDD (degenerative disc disease), lumbar   M54.16 (ICD-10-CM) - Lumbar radiculopathy   G89.4 (ICD-10-CM) - Chronic pain disorder         Evaluation Date: 2020  Authorization Period Expiration: 21  Plan of Care Expiration: 2020  Reassessment Due: 3/13/2020  Visit # / Visits authorized:       Time In: 10:00  Time Out: 11:00  Total Billable Time: 45 minutes    Precautions: Standard    Pattern of pain determined: 1 PEP    Subjective   Jhony reports see in UPOC  Pain Location: bilateral low back, buttocks     Work and leisure: difficulty  Pt goals: Perform occupational duties painfree    Objective     Baseline IM Testing Results:   Date of testin2020  ROM 0-36 deg   Max Peak Torque 277    Min Peak Torque 242    Flex/Ext Ratio 1.14   % below normative data 30     Outcomes:  Initial score: CL = least 60% but < 80% impaired, limited or restricted  Visit 5 score: 47% on 20  Goal: CK = at least 40% but < 60% impaired, limited or restricted      Treatment    Pt was instructed in and performed the following:     Jhony received therapeutic exercises to develop/improved posture, cardiovascular endurance, muscular endurance, lumbar/cervical ROM, strength and muscular endurance for 15 minutes including the following exercises:   SKTC  LTR with ball  DKTC with ball  Cat cow  Quadruped rockback    [facility] 2020   Visit Number 7   VAS Pain Rating 8   Treadmill Time (in min.) 0   Speed 2   Time -   Extension in Lying -   Flexion in Lying -   Flexion in Sitting -   Femur  Restraint 6   Top Dead Center 18   Counterweight 412   Lumbar Flexion 30   Lumbar Extension 0   Lumbar Peak Torque -167   Min Torque 135   Lumbar Weight 60   Repetitions 4   Rating of Perceived Exertion 6   Ice - Z Lie (in min.) 10     NOT TODAY ON 5/19/20  Peripheral muscle strengthening which included 1 set of 15-20 repetitions at a slow, controlled 10-13 second per rep pace focused on strengthening supporting musculature for improved body mechanics and functional mobility.  Pt and therapist focused on proper form during treatment to ensure optimal strengthening of each targeted muscle group.  Machines were utilized including torso rotation, leg extension, leg curl, chest press, upright row. Tricep extension, bicep curl, leg press, and hip abduction added visit 3    Jhony received the following manual therapy techniques: Joint mobilizations, Manual traction and Soft tissue Mobilization were applied to the lumbar spine for 15 minutes:  STM to lumbar paraspinals  Thoracolumbar P/As    Home Exercises Provided and Patient Education Provided     Education provided:   - Review of home program    Written Home Exercises Provided: yes.  Exercises were reviewed and Jhony was able to demonstrate them prior to the end of the session.  Jhony demonstrated good  understanding of the education provided.     See EMR under Patient Instructions for exercises provided 5/19/20.    Assessment   See in UPOC    Pt will continue to benefit from skilled outpatient physical therapy to address the deficits stated in the impairment chart, provide pt/family education and to maximize pt's level of independence in the home and community environment.     Anticipated Barriers for therapy: none  Pt's spiritual, cultural and educational needs considered and pt agreeable to plan of care and goals as stated below:     Goals:   Short term goals:  6 weeks or 10 visits   1.  Pt will demonstrate increased lumbar ROM by at least 3 degrees from the initial ROM  value with improvements noted in functional ROM and ability to perform ADLs. NOT MET    2.  Pt will demonstrate increased maximum isometric torque value by 5% when compared to the initial value resulting in improved ability to perform bending, lifting, and carrying activities safely, confidently. NOT MET     3.  Patient report a reduction in worst pain score by 1-2 points for improved tolerance for washing his face. NOT MET    4.  Pt able to perform HEP correctly with minimal cueing or supervision from therapist to encourage independent management of symptoms.  NOT MET     Long term goals: 10 weeks or 20 visits   1. Pt will demonstrate increased lumbar ROM by at least 6 degrees from initial ROM value, resulting in improved ability to perform functional fwd bending while standing and sitting. NOT MET    2. Pt will demonstrate increased maximum isometric torque value by 10% when compared to the initial value resulting in improved ability to perform bending, lifting, and carrying activities safely, confidently. NOT MET    3. Pt to demonstrate ability to independently control and reduce their pain through posture positioning and mechanical movements throughout a typical day. NOT MET    4.  Pt will demonstrate reduced pain and improved functional outcomes as reported on the Oswestry Disability Index by reaching a score of 64 or less in order to demonstrate subjective improvement in pt's condition.   NOT MET    5. Pt will demonstrate independence with the HEP at discharge NOT MET    6.  Patient will report ability to perform ADL's without pain increase. NOT MET    Plan   Continue with established Plan of Care towards established PT goals.

## 2020-05-19 ENCOUNTER — CLINICAL SUPPORT (OUTPATIENT)
Dept: REHABILITATION | Facility: HOSPITAL | Age: 50
End: 2020-05-19
Payer: COMMERCIAL

## 2020-05-19 DIAGNOSIS — M25.69 DECREASED ROM OF TRUNK AND BACK: ICD-10-CM

## 2020-05-19 DIAGNOSIS — R29.898 WEAKNESS OF BOTH HIPS: ICD-10-CM

## 2020-05-19 DIAGNOSIS — R29.898 IMPAIRED FLEXIBILITY OF LOWER EXTREMITY: ICD-10-CM

## 2020-05-19 PROCEDURE — 97110 THERAPEUTIC EXERCISES: CPT

## 2020-05-19 PROCEDURE — 97140 MANUAL THERAPY 1/> REGIONS: CPT

## 2020-05-19 PROCEDURE — 97164 PT RE-EVAL EST PLAN CARE: CPT | Mod: 59

## 2020-06-03 ENCOUNTER — TELEPHONE (OUTPATIENT)
Dept: ADMINISTRATIVE | Facility: OTHER | Age: 50
End: 2020-06-03

## 2020-06-22 ENCOUNTER — TELEPHONE (OUTPATIENT)
Dept: ADMINISTRATIVE | Facility: OTHER | Age: 50
End: 2020-06-22

## 2020-06-22 NOTE — TELEPHONE ENCOUNTER
----- Message from Kasi Block sent at 6/19/2020  2:42 PM CDT -----  Regarding: Appointment Access  Contact: RENÉ RED [[id number]]  Name of Who is Calling: RENÉ RED [[id number]]      What is the request in detail: Would like to speak staff in regards to scheduling procedure. Please advise      Can the clinic reply by [facility]: no      What Number to Call Back if not in [facility]: [phone]

## 2020-08-16 ENCOUNTER — TELEPHONE (OUTPATIENT)
Dept: ADMINISTRATIVE | Facility: OTHER | Age: 50
End: 2020-08-16

## 2020-08-16 NOTE — TELEPHONE ENCOUNTER
----- Message from Wendy Kinney MA sent at 8/14/2020  4:00 PM CDT -----  Regarding: FW: schedule a procedure  Please contact and schedule. The following     Plan to s/f  L5-S1 ILESI when procedures resume      Wendy   ----- Message -----  From: Patricia Oreilly  Sent: 8/14/2020   3:47 PM CDT  To: [facility] Staff  Subject: schedule a procedure                             Name of Who is Calling: RENÉ RED [[id number]]      What is the request in detail:  Patient is requesting a call back to schedule his procedure       Can the clinic reply by [facility]: no      What Number to Call Back if not in [facility]: [phone]

## 2020-08-19 ENCOUNTER — TELEPHONE (OUTPATIENT)
Dept: ADMINISTRATIVE | Facility: OTHER | Age: 50
End: 2020-08-19

## 2020-09-16 ENCOUNTER — OFFICE VISIT (OUTPATIENT)
Dept: PAIN MEDICINE | Facility: CLINIC | Age: 50
End: 2020-09-16
Attending: ANESTHESIOLOGY
Payer: COMMERCIAL

## 2020-09-16 VITALS
HEIGHT: 73 IN | DIASTOLIC BLOOD PRESSURE: 67 MMHG | WEIGHT: 255 LBS | BODY MASS INDEX: 33.8 KG/M2 | HEART RATE: 72 BPM | OXYGEN SATURATION: 100 % | TEMPERATURE: 98 F | RESPIRATION RATE: 18 BRPM | SYSTOLIC BLOOD PRESSURE: 127 MMHG

## 2020-09-16 DIAGNOSIS — M54.16 LUMBAR RADICULOPATHY: Primary | ICD-10-CM

## 2020-09-16 DIAGNOSIS — M51.36 DDD (DEGENERATIVE DISC DISEASE), LUMBAR: ICD-10-CM

## 2020-09-16 DIAGNOSIS — G89.4 CHRONIC PAIN DISORDER: ICD-10-CM

## 2020-09-16 DIAGNOSIS — G89.4 CHRONIC PAIN SYNDROME: ICD-10-CM

## 2020-09-16 PROCEDURE — 1125F AMNT PAIN NOTED PAIN PRSNT: CPT | Mod: S$GLB,,, | Performed by: ANESTHESIOLOGY

## 2020-09-16 PROCEDURE — 99999 PR PBB SHADOW E&M-EST. PATIENT-LVL III: ICD-10-PCS | Mod: PBBFAC,,, | Performed by: ANESTHESIOLOGY

## 2020-09-16 PROCEDURE — 99214 PR OFFICE/OUTPT VISIT, EST, LEVL IV, 30-39 MIN: ICD-10-PCS | Mod: S$GLB,,, | Performed by: ANESTHESIOLOGY

## 2020-09-16 PROCEDURE — 99214 OFFICE O/P EST MOD 30 MIN: CPT | Mod: S$GLB,,, | Performed by: ANESTHESIOLOGY

## 2020-09-16 PROCEDURE — 3008F PR BODY MASS INDEX (BMI) DOCUMENTED: ICD-10-PCS | Mod: CPTII,S$GLB,, | Performed by: ANESTHESIOLOGY

## 2020-09-16 PROCEDURE — 1125F PR PAIN SEVERITY QUANTIFIED, PAIN PRESENT: ICD-10-PCS | Mod: S$GLB,,, | Performed by: ANESTHESIOLOGY

## 2020-09-16 PROCEDURE — 99999 PR PBB SHADOW E&M-EST. PATIENT-LVL III: CPT | Mod: PBBFAC,,, | Performed by: ANESTHESIOLOGY

## 2020-09-16 PROCEDURE — 3008F BODY MASS INDEX DOCD: CPT | Mod: CPTII,S$GLB,, | Performed by: ANESTHESIOLOGY

## 2020-09-16 RX ORDER — GABAPENTIN 600 MG/1
TABLET ORAL
Qty: 180 TABLET | Refills: 11 | Status: SHIPPED | OUTPATIENT
Start: 2020-09-16 | End: 2021-11-05

## 2020-09-16 NOTE — PROGRESS NOTES
Follow up  Referring Physician: No ref. provider found     Chief Complaint:   No chief complaint on file.                   SUBJECTIVE: Disclaimer: This note has been generated using voice-recognition software. There may be typographical errors that have been missed during proof-reading   Interval history 09/16/2020:  Since previous encounter the patient has been taking gabapentin 1800 mg per day without any side effects and some improvement in his symptoms, and we will previously scheduled to perform a the epidural injection for his lower extremity radicular pain but was canceled secondary to COVID.  Currently he continues to have bilateral lower extremity radicular pain symptoms worse on the right.  Previous EMG showed mild chronic bilateral lower extremity radicular pains    Interval History 4/7/20:  Follow up for lumbar radicular pain. Patient's scheduled L5-S1 ILESI has been postponed due to covid outbreak.He continues to have lower back pain with bilateral radicular pain to his heels. His gabapentin was increased to 600 mg tid since last visit. He notices slight improvement in pain since increasing gabapentin. He also takes Norco 10 tid prn that is prescribed by his PCP. He is also taking Cymbalta 20 mg qday.      Interval history 03/11/2020:  Since previous encounter the patient is status post right-sided L2-3 and L4-5 transforaminal epidural steroid injections on 02/27/2020 he had about 30% improvement from the previous injection, he has also had some improvement from increasing his gabapentin to 300 mg t.i.d..  He does not have any side effects to this medication.  He continues to have weakness in his right lower extremity continues to have radicular pain symptoms, he is undergoing physical therapy without any worsening of his symptoms or significant improvement.  He needs paperwork filled out for FMLA for his with job. He stopped taking nabumetone because it did not help. He had started PT and  "he thinks it may have been helping but now he is doing home PT     Initial encounter:     Jhony Matute presents to the clinic for the evaluation of lower back pain. The pain started several eyars ago following no inciting event and symptoms have been unchanged.     Brief history:     Pain Description:     The pain is located in the lumbosacral area and radiates to the BLE to his heels. For several years he has had numbness of his Right lateral thigh. He endorses weakness in his thighs after prolonged sitting and sometimes feels he needs to prop himself up at his desk.      At BEST  8/10      At WORST  10/10 on the WORST day.       On average pain is rated as 10/10.      Today the pain is rated as 9/10     The pain is described as numbing, sharp and shooting       Symptoms interfere with daily activity, sleeping and work.      Exacerbating factors: Sitting, Bending and Walking.       Mitigating factors medications.      Patient denies night fever/night sweats, urinary incontinence, bowel incontinence and significant weight loss.  Patient denies any suicidal or homicidal ideations     Pain Medications:  Current:  -Hydrocodone tid prn; gabapentin 300 bid, nabumetone, flexeril     Tried in Past:  NSAIDs -nabumetone  TCA -Never  SNRI -Never  Anti-convulsants -gabapentin  Muscle Relaxants -flexeril  Opioids-norco     Physical Therapy/Home Exercise: yes (in  or ) "did not help much"      report:  Reviewed and consistent with medication use as prescribed.     Pain Procedures:   -11/15/19 R L4-5 and L5-S1 TFESI  - 3 other ESIs over past 3 years in [place]  2020-right L2-3 and L4-5 transforaminal epidural steroid     Chiropractor -never  Acupuncture - never  TENS unit -never  Spinal decompression -never  Joint replacement -never     Imagin20 MRI Lumbar        FINDINGS:  Alignment: Alignment is within normal limits.    Vertebrae: Vertebral body heights are well maintained with no evidence of " fracture or marrow replacement process.    Discs: Multilevel disc desiccation with maintained disc heights.    Cord: Distal cord is normal in contour and signal intensity.  Conus terminates at L1.    Degenerative findings:    T12-L1: Circumferential disc bulge with left foraminal zone protrusion resulting in mild left neural foraminal narrowing.    L1-L2: Circumferential disc bulge results in mild right neural foraminal narrowing.    L2-L3: Circumferential disc bulge and mild facet arthropathy result in mild right neural foraminal narrowing.    L3-L4: Circumferential disc bulge and mild facet arthropathy noted.  No spinal canal stenosis or neural foraminal narrowing.    L4-L5: Circumferential disc bulge and mild facet arthropathy result in mild bilateral neural foraminal narrowing.    L5-S1: Mild facet arthropathy noted.  No spinal canal stenosis or neural foraminal narrowing.    Paraspinal muscles & soft tissues: Unremarkable.    Partially visualized abdominal organs are unremarkable.    Sacroiliac joints are unremarkable.  Small subcentimeter T1/T2 hypointense lesion in the right inferior iliac bone, favoring a bone island.       Impression         Mild multilevel degenerative changes of the lumbar spine resulting in mild neural foraminal narrowing.      9/18/19 EMG BLE:  bilateral L4-S1 radiculopathy.             Past Medical History:   Diagnosis Date    Hypertension              Past Surgical History:   Procedure Laterality Date    TRANSFORAMINAL EPIDURAL INJECTION OF STEROID Right 11/15/2019     Procedure: LUMBAR TRANSFORAMINAL RIGHT L4/5 AND L5/S1 TF [place];  Surgeon: Michael Montes MD;  Location: [facility];  Service: Pain Management;  Laterality: Right;  NEEDS CONSENT    TRANSFORAMINAL EPIDURAL INJECTION OF STEROID Right 2/27/2020     Procedure: INJECTION, STEROID, EPIDURAL, TRANSFORAMINAL APPROACH, L2-L3 AND L4-L5;  Surgeon: Shon Brunner MD;  Location: [facility];  Service: Pain Management;   Laterality: Right;      Social History               Socioeconomic History    Marital status:        Spouse name: Not on file    Number of children: Not on file    Years of education: Not on file    Highest education level: Not on file   Occupational History    Not on file   Social Needs    Financial resource strain: Not on file    Food insecurity:       Worry: Not on file       Inability: Not on file    Transportation needs:       Medical: Not on file       Non-medical: Not on file   Tobacco Use    Smoking status: Never Smoker    Smokeless tobacco: Never Used   Substance and Sexual Activity    Alcohol use: No       Comment: socially    Drug use: No    Sexual activity: Yes       Partners: Female   Lifestyle    Physical activity:       Days per week: Not on file       Minutes per session: Not on file    Stress: Not on file   Relationships    Social connections:       Talks on phone: Not on file       Gets together: Not on file       Attends Yarsani service: Not on file       Active member of club or organization: Not on file       Attends meetings of clubs or organizations: Not on file       Relationship status: Not on file   Other Topics Concern    Not on file   Social History Narrative    Not on file               Family History   Problem Relation Age of Onset    Stroke Mother      Stroke Father      Heart attack Father           Review of patient's allergies indicates:  No Known Allergies          Current Outpatient Medications   Medication Sig    cyclobenzaprine (FLEXERIL) 10 MG tablet      dextroamphetamine-amphetamine (ADDERALL) 15 mg tablet      DULoxetine (CYMBALTA) 20 MG capsule      gabapentin (NEURONTIN) 300 MG capsule Take 300 mg by mouth 3 (three) times daily.    gabapentin (NEURONTIN) 300 MG capsule Increase to 2 tablets at night, 1 in AM and 1 in afternoon for 3 days.  Then increase to 2 tablets at night and 2 in morning, 1 tablet in afternoon for 3 days.  Then  "increase to 2 tablets three times a day (1800mg)    HYDROcodone-acetaminophen (NORCO)  mg per tablet      nabumetone (RELAFEN) 500 MG tablet Take 500 mg by mouth 2 (two) times daily.    omega-3 acid ethyl esters (LOVAZA) 1 gram capsule      ondansetron (ZOFRAN-ODT) 8 MG TbDL      rosuvastatin (CRESTOR) 10 MG tablet Take 10 mg by mouth once daily.      No current facility-administered medications for this visit.          REVIEW OF SYSTEMS:     GENERAL:  No weight loss, malaise or fevers.  HEENT:   No recent changes in vision or hearing  NECK:  Negative for lumps, no difficulty with swallowing.  RESPIRATORY:  Negative for cough, wheezing or shortness of breath, patient denies any recent URI.  CARDIOVASCULAR:  Negative for chest pain, leg swelling or palpitations.  GI:  Negative for abdominal discomfort, blood in stools or black stools or change in bowel habits.  MUSCULOSKELETAL:  See HPI.  SKIN:  Negative for lesions, rash, and itching.  PSYCH:  No mood disorder or recent psychosocial stressors.  Sleep disorder  HEMATOLOGY/LYMPHOLOGY:  Negative for prolonged bleeding, bruising easily or swollen nodes.  Patient is not currently taking any anti-coagulants  ENDO: No history of diabetes or thyroid dysfunction. gout  NEURO:   No history of headaches, syncope, paralysis, seizures or tremors.  All other reviewed and negative other than HPI.     OBJECTIVE:     BP (!) 155/89   Pulse 66   Temp 98.5 °F (36.9 °C)   Resp 18   Ht 6' 1" (1.854 m)   Wt 114.9 kg (253 lb 4.9 oz)   SpO2 100%   BMI 33.42 kg/m²      BMP        Lab Results   Component Value Date      01/15/2018     K 4.2 01/15/2018     CL 99 01/15/2018     CO2 28 01/15/2018     BUN 13 01/15/2018     CREATININE 1.2 01/15/2018     CALCIUM 9.7 01/15/2018     ANIONGAP 9 01/15/2018     ESTGFRAFRICA >60.0 01/15/2018     EGFRNONAA >60.0 01/15/2018         PHYSICAL EXAMINATION:     FINDINGS BELOW ARE FROM PREVIOUS ENCOUNTER, AS THIS ENCOUNTER VIA TELEHEALTH " VISIT     GENERAL: Well appearing, in no acute distress, alert and oriented x3.  PSYCH:  Mood and affect appropriate.  SKIN: Skin color, texture, turgor normal, no rashes or lesions.  HEAD/FACE:  Normocephalic, atraumatic. Cranial nerves grossly intact.  NECK: No pain to palpation over the cervical paraspinous muscles. Spurling Negative. No pain with neck flexion, extension, or lateral flexion.   CV: RRR with palpation of the radial artery.  PULM: No evidence of respiratory difficulty, symmetric chest rise.  GI:  Soft and non-tender.  BACK: +SLR bilaterally. No pain to palpation over the facet joints of the lumbar spine or spinous processes. Normal range of motion without pain reproduction.  EXTREMITIES: Peripheral joint ROM is full and pain free without obvious instability or laxity in all four extremities. No deformities, edema, or skin discoloration. Good capillary refill.  MUSCULOSKELETAL: Shoulder, hip, and knee provocative maneuvers are negative.  There is no pain with palpation over the sacroiliac joints bilaterally.  FABERs test is negative although causes axial back pain.  FADIRs test is negative.   Strength 4/5 Right EHL extension and thigh flexion. Otherwise strength is normal upper and lower extremity on left.  No atrophy or tone abnormalities are noted.  NEURO: Bilateral upper and lower extremity coordination and muscle stretch reflexes are physiologic and symmetric.  Plantar response are downgoing. No clonus.  Decreased sensation Right thigh above knee  GAIT: antalgic.     Creatinine 0.96 12/12/2019  ASSESSMENT: 49 y.o. year old male with pain, consistent with             Encounter Diagnoses   Name Primary?    Lumbar radiculopathy Yes    Chronic pain syndrome     DDD (degenerative disc disease), lumbar     Chronic pain disorder       PLAN:      - I have stressed the importance of physical activity and a home exercise plan to help with pain and improve health.     - Patient can continue with  medications for now since they are providing benefits, using them appropriately, and without side effects.     - escalate gabapentin to 3600 mg per day gradually 600 mg tablets was provided as a refill today    - Follow up in 1 Month     - Plan to s/f  L5-S1 ILESI if no improvement     - Continue  Cymbalta 20 mg qday     - continue home exercise    -in the future we may repeat EMG/NCV          The above plan and management options were discussed at length with patient. Patient is in agreement with the above and verbalized understanding. It will be communicated with the referring physician via electronic record, fax, or mail.     Hamzah Cade  04/07/2020     I have personally reviewed the phone encounter with resident/fellow/NP and personally spoke with patient for over 5 min after addressing all questions and concerns   The phone call was initiated by patient who consented and verbalized understanding to the type of encounter not related to any office visit or other encounter in the past 7 days    Shon Brunner MD 09/16/2020

## 2020-09-28 ENCOUNTER — PATIENT MESSAGE (OUTPATIENT)
Dept: RESEARCH | Facility: OTHER | Age: 50
End: 2020-09-28

## 2020-10-20 RX ORDER — METHYLPREDNISOLONE 4 MG/1
TABLET ORAL
Qty: 1 PACKAGE | Refills: 0 | Status: SHIPPED | OUTPATIENT
Start: 2020-10-20 | End: 2020-11-10

## 2020-10-30 ENCOUNTER — PATIENT MESSAGE (OUTPATIENT)
Dept: PAIN MEDICINE | Facility: CLINIC | Age: 50
End: 2020-10-30

## 2021-05-28 ENCOUNTER — TELEPHONE (OUTPATIENT)
Dept: PAIN MEDICINE | Facility: CLINIC | Age: 51
End: 2021-05-28

## 2021-06-21 ENCOUNTER — TELEPHONE (OUTPATIENT)
Dept: PAIN MEDICINE | Facility: CLINIC | Age: 51
End: 2021-06-21

## 2021-06-22 ENCOUNTER — OFFICE VISIT (OUTPATIENT)
Dept: PAIN MEDICINE | Facility: CLINIC | Age: 51
End: 2021-06-22
Payer: COMMERCIAL

## 2021-06-22 VITALS
BODY MASS INDEX: 31.81 KG/M2 | RESPIRATION RATE: 18 BRPM | DIASTOLIC BLOOD PRESSURE: 78 MMHG | HEIGHT: 73 IN | WEIGHT: 240 LBS | HEART RATE: 65 BPM | SYSTOLIC BLOOD PRESSURE: 126 MMHG

## 2021-06-22 DIAGNOSIS — G89.4 CHRONIC PAIN SYNDROME: ICD-10-CM

## 2021-06-22 DIAGNOSIS — M54.16 LUMBAR RADICULOPATHY: Primary | ICD-10-CM

## 2021-06-22 DIAGNOSIS — M51.36 DDD (DEGENERATIVE DISC DISEASE), LUMBAR: ICD-10-CM

## 2021-06-22 PROCEDURE — 3008F PR BODY MASS INDEX (BMI) DOCUMENTED: ICD-10-PCS | Mod: CPTII,S$GLB,, | Performed by: ANESTHESIOLOGY

## 2021-06-22 PROCEDURE — 1125F AMNT PAIN NOTED PAIN PRSNT: CPT | Mod: S$GLB,,, | Performed by: ANESTHESIOLOGY

## 2021-06-22 PROCEDURE — 99214 PR OFFICE/OUTPT VISIT, EST, LEVL IV, 30-39 MIN: ICD-10-PCS | Mod: S$GLB,,, | Performed by: ANESTHESIOLOGY

## 2021-06-22 PROCEDURE — 99214 OFFICE O/P EST MOD 30 MIN: CPT | Mod: S$GLB,,, | Performed by: ANESTHESIOLOGY

## 2021-06-22 PROCEDURE — 99999 PR PBB SHADOW E&M-EST. PATIENT-LVL III: CPT | Mod: PBBFAC,,, | Performed by: ANESTHESIOLOGY

## 2021-06-22 PROCEDURE — 99999 PR PBB SHADOW E&M-EST. PATIENT-LVL III: ICD-10-PCS | Mod: PBBFAC,,, | Performed by: ANESTHESIOLOGY

## 2021-06-22 PROCEDURE — 1125F PR PAIN SEVERITY QUANTIFIED, PAIN PRESENT: ICD-10-PCS | Mod: S$GLB,,, | Performed by: ANESTHESIOLOGY

## 2021-06-22 PROCEDURE — 3008F BODY MASS INDEX DOCD: CPT | Mod: CPTII,S$GLB,, | Performed by: ANESTHESIOLOGY

## 2021-07-06 ENCOUNTER — TELEPHONE (OUTPATIENT)
Dept: ADMINISTRATIVE | Facility: OTHER | Age: 51
End: 2021-07-06

## 2021-07-06 ENCOUNTER — TELEPHONE (OUTPATIENT)
Dept: PAIN MEDICINE | Facility: CLINIC | Age: 51
End: 2021-07-06

## 2021-07-15 ENCOUNTER — OFFICE VISIT (OUTPATIENT)
Dept: PODIATRY | Facility: CLINIC | Age: 51
End: 2021-07-15
Payer: COMMERCIAL

## 2021-07-15 ENCOUNTER — TELEPHONE (OUTPATIENT)
Dept: PAIN MEDICINE | Facility: CLINIC | Age: 51
End: 2021-07-15

## 2021-07-15 DIAGNOSIS — M54.16 LUMBAR RADICULOPATHY: ICD-10-CM

## 2021-07-15 DIAGNOSIS — M79.671 CHRONIC FOOT PAIN, RIGHT: ICD-10-CM

## 2021-07-15 DIAGNOSIS — R20.2 PARESTHESIA OF BOTH FEET: Primary | ICD-10-CM

## 2021-07-15 DIAGNOSIS — M79.672 CHRONIC PAIN IN LEFT FOOT: ICD-10-CM

## 2021-07-15 DIAGNOSIS — G89.29 CHRONIC PAIN IN LEFT FOOT: ICD-10-CM

## 2021-07-15 DIAGNOSIS — G89.29 CHRONIC FOOT PAIN, RIGHT: ICD-10-CM

## 2021-07-15 PROCEDURE — 99999 PR PBB SHADOW E&M-EST. PATIENT-LVL III: CPT | Mod: PBBFAC,,, | Performed by: PODIATRIST

## 2021-07-15 PROCEDURE — 99204 PR OFFICE/OUTPT VISIT, NEW, LEVL IV, 45-59 MIN: ICD-10-PCS | Mod: S$GLB,,, | Performed by: PODIATRIST

## 2021-07-15 PROCEDURE — 99999 PR PBB SHADOW E&M-EST. PATIENT-LVL III: ICD-10-PCS | Mod: PBBFAC,,, | Performed by: PODIATRIST

## 2021-07-15 PROCEDURE — 99204 OFFICE O/P NEW MOD 45 MIN: CPT | Mod: S$GLB,,, | Performed by: PODIATRIST

## 2021-07-15 RX ORDER — ICOSAPENT ETHYL 1000 MG/1
4 CAPSULE ORAL DAILY
COMMUNITY
Start: 2021-02-02

## 2021-07-15 RX ORDER — FLUTICASONE PROPIONATE 50 MCG
1 SPRAY, SUSPENSION (ML) NASAL
COMMUNITY
Start: 2021-06-02 | End: 2021-11-05

## 2021-07-16 ENCOUNTER — TELEPHONE (OUTPATIENT)
Dept: PAIN MEDICINE | Facility: CLINIC | Age: 51
End: 2021-07-16

## 2021-07-22 ENCOUNTER — TELEPHONE (OUTPATIENT)
Dept: PAIN MEDICINE | Facility: CLINIC | Age: 51
End: 2021-07-22

## 2021-07-23 ENCOUNTER — TELEPHONE (OUTPATIENT)
Dept: PAIN MEDICINE | Facility: CLINIC | Age: 51
End: 2021-07-23

## 2021-08-05 DIAGNOSIS — M54.16 LUMBAR RADICULOPATHY: Primary | ICD-10-CM

## 2021-09-29 ENCOUNTER — TELEPHONE (OUTPATIENT)
Dept: PAIN MEDICINE | Facility: CLINIC | Age: 51
End: 2021-09-29

## 2021-10-19 ENCOUNTER — HOSPITAL ENCOUNTER (OUTPATIENT)
Facility: OTHER | Age: 51
Discharge: HOME | End: 2021-10-19
Attending: ANESTHESIOLOGY | Admitting: ANESTHESIOLOGY
Payer: COMMERCIAL

## 2021-10-19 VITALS
TEMPERATURE: 98 F | HEIGHT: 73 IN | OXYGEN SATURATION: 98 % | RESPIRATION RATE: 14 BRPM | DIASTOLIC BLOOD PRESSURE: 77 MMHG | SYSTOLIC BLOOD PRESSURE: 127 MMHG | BODY MASS INDEX: 32.47 KG/M2 | HEART RATE: 60 BPM | WEIGHT: 245 LBS

## 2021-10-19 DIAGNOSIS — M51.36 DDD (DEGENERATIVE DISC DISEASE), LUMBAR: ICD-10-CM

## 2021-10-19 DIAGNOSIS — M54.16 LUMBAR RADICULOPATHY: Primary | ICD-10-CM

## 2021-10-19 DIAGNOSIS — G89.29 CHRONIC PAIN: ICD-10-CM

## 2021-10-19 PROCEDURE — 64484 NJX AA&/STRD TFRM EPI L/S EA: CPT | Mod: RT | Performed by: ANESTHESIOLOGY

## 2021-10-19 PROCEDURE — 63600175 PHARM REV CODE 636 W HCPCS: Performed by: ANESTHESIOLOGY

## 2021-10-19 PROCEDURE — 25500020 PHARM REV CODE 255: Performed by: ANESTHESIOLOGY

## 2021-10-19 PROCEDURE — 64484 NJX AA&/STRD TFRM EPI L/S EA: CPT | Mod: RT,,, | Performed by: ANESTHESIOLOGY

## 2021-10-19 PROCEDURE — 64484 PRA INJECT ANES/STEROID FORAMEN LUMBAR/SACRAL W IMG GUIDE ,EA ADD LEVEL: ICD-10-PCS | Mod: RT,,, | Performed by: ANESTHESIOLOGY

## 2021-10-19 PROCEDURE — 64483 NJX AA&/STRD TFRM EPI L/S 1: CPT | Mod: RT | Performed by: ANESTHESIOLOGY

## 2021-10-19 PROCEDURE — 64483 NJX AA&/STRD TFRM EPI L/S 1: CPT | Mod: RT,,, | Performed by: ANESTHESIOLOGY

## 2021-10-19 PROCEDURE — 64483 PR EPIDURAL INJ, ANES/STEROID, TRANSFORAMINAL, LUMB/SACR, SNGL LEVL: ICD-10-PCS | Mod: RT,,, | Performed by: ANESTHESIOLOGY

## 2021-10-19 PROCEDURE — 25000003 PHARM REV CODE 250: Performed by: ANESTHESIOLOGY

## 2021-10-19 RX ORDER — BUPIVACAINE HYDROCHLORIDE 2.5 MG/ML
INJECTION, SOLUTION EPIDURAL; INFILTRATION; INTRACAUDAL
Status: DISCONTINUED | OUTPATIENT
Start: 2021-10-19 | End: 2021-10-19 | Stop reason: HOSPADM

## 2021-10-19 RX ORDER — SODIUM CHLORIDE 9 MG/ML
INJECTION, SOLUTION INTRAVENOUS CONTINUOUS
Status: DISCONTINUED | OUTPATIENT
Start: 2021-10-19 | End: 2021-10-19 | Stop reason: HOSPADM

## 2021-10-19 RX ORDER — MIDAZOLAM HYDROCHLORIDE 1 MG/ML
INJECTION INTRAMUSCULAR; INTRAVENOUS
Status: DISCONTINUED | OUTPATIENT
Start: 2021-10-19 | End: 2021-10-19 | Stop reason: HOSPADM

## 2021-10-19 RX ORDER — METHYLPREDNISOLONE ACETATE 40 MG/ML
INJECTION, SUSPENSION INTRA-ARTICULAR; INTRALESIONAL; INTRAMUSCULAR; SOFT TISSUE
Status: DISCONTINUED | OUTPATIENT
Start: 2021-10-19 | End: 2021-10-19 | Stop reason: HOSPADM

## 2021-10-19 RX ORDER — FENTANYL CITRATE 50 UG/ML
INJECTION, SOLUTION INTRAMUSCULAR; INTRAVENOUS
Status: DISCONTINUED | OUTPATIENT
Start: 2021-10-19 | End: 2021-10-19 | Stop reason: HOSPADM

## 2021-10-19 RX ORDER — DEXAMETHASONE SODIUM PHOSPHATE 10 MG/ML
INJECTION INTRAMUSCULAR; INTRAVENOUS
Status: DISCONTINUED | OUTPATIENT
Start: 2021-10-19 | End: 2021-10-19 | Stop reason: HOSPADM

## 2021-11-04 ENCOUNTER — TELEPHONE (OUTPATIENT)
Dept: PAIN MEDICINE | Facility: CLINIC | Age: 51
End: 2021-11-04
Payer: COMMERCIAL

## 2021-11-05 ENCOUNTER — OFFICE VISIT (OUTPATIENT)
Dept: PAIN MEDICINE | Facility: CLINIC | Age: 51
End: 2021-11-05
Payer: COMMERCIAL

## 2021-11-05 VITALS
DIASTOLIC BLOOD PRESSURE: 77 MMHG | HEIGHT: 73 IN | WEIGHT: 244.5 LBS | RESPIRATION RATE: 18 BRPM | SYSTOLIC BLOOD PRESSURE: 128 MMHG | BODY MASS INDEX: 32.4 KG/M2 | HEART RATE: 62 BPM

## 2021-11-05 DIAGNOSIS — M54.16 LUMBAR RADICULOPATHY: Primary | ICD-10-CM

## 2021-11-05 DIAGNOSIS — M54.17 LUMBOSACRAL RADICULOPATHY: ICD-10-CM

## 2021-11-05 DIAGNOSIS — M51.36 DDD (DEGENERATIVE DISC DISEASE), LUMBAR: ICD-10-CM

## 2021-11-05 DIAGNOSIS — G89.4 CHRONIC PAIN DISORDER: ICD-10-CM

## 2021-11-05 DIAGNOSIS — M47.816 LUMBAR SPONDYLOSIS: ICD-10-CM

## 2021-11-05 PROCEDURE — 1159F PR MEDICATION LIST DOCUMENTED IN MEDICAL RECORD: ICD-10-PCS | Mod: CPTII,S$GLB,, | Performed by: NURSE PRACTITIONER

## 2021-11-05 PROCEDURE — 99999 PR PBB SHADOW E&M-EST. PATIENT-LVL IV: ICD-10-PCS | Mod: PBBFAC,,, | Performed by: NURSE PRACTITIONER

## 2021-11-05 PROCEDURE — 3008F PR BODY MASS INDEX (BMI) DOCUMENTED: ICD-10-PCS | Mod: CPTII,S$GLB,, | Performed by: NURSE PRACTITIONER

## 2021-11-05 PROCEDURE — 3074F PR MOST RECENT SYSTOLIC BLOOD PRESSURE < 130 MM HG: ICD-10-PCS | Mod: CPTII,S$GLB,, | Performed by: NURSE PRACTITIONER

## 2021-11-05 PROCEDURE — 99213 OFFICE O/P EST LOW 20 MIN: CPT | Mod: S$GLB,,, | Performed by: NURSE PRACTITIONER

## 2021-11-05 PROCEDURE — 99213 PR OFFICE/OUTPT VISIT, EST, LEVL III, 20-29 MIN: ICD-10-PCS | Mod: S$GLB,,, | Performed by: NURSE PRACTITIONER

## 2021-11-05 PROCEDURE — 99999 PR PBB SHADOW E&M-EST. PATIENT-LVL IV: CPT | Mod: PBBFAC,,, | Performed by: NURSE PRACTITIONER

## 2021-11-05 PROCEDURE — 1160F RVW MEDS BY RX/DR IN RCRD: CPT | Mod: CPTII,S$GLB,, | Performed by: NURSE PRACTITIONER

## 2021-11-05 PROCEDURE — 3078F PR MOST RECENT DIASTOLIC BLOOD PRESSURE < 80 MM HG: ICD-10-PCS | Mod: CPTII,S$GLB,, | Performed by: NURSE PRACTITIONER

## 2021-11-05 PROCEDURE — 1160F PR REVIEW ALL MEDS BY PRESCRIBER/CLIN PHARMACIST DOCUMENTED: ICD-10-PCS | Mod: CPTII,S$GLB,, | Performed by: NURSE PRACTITIONER

## 2021-11-05 PROCEDURE — 3074F SYST BP LT 130 MM HG: CPT | Mod: CPTII,S$GLB,, | Performed by: NURSE PRACTITIONER

## 2021-11-05 PROCEDURE — 1159F MED LIST DOCD IN RCRD: CPT | Mod: CPTII,S$GLB,, | Performed by: NURSE PRACTITIONER

## 2021-11-05 PROCEDURE — 3008F BODY MASS INDEX DOCD: CPT | Mod: CPTII,S$GLB,, | Performed by: NURSE PRACTITIONER

## 2021-11-05 PROCEDURE — 3078F DIAST BP <80 MM HG: CPT | Mod: CPTII,S$GLB,, | Performed by: NURSE PRACTITIONER

## 2021-11-05 RX ORDER — GABAPENTIN 600 MG/1
1200 TABLET ORAL 3 TIMES DAILY
Qty: 180 TABLET | Refills: 5 | Status: SHIPPED | OUTPATIENT
Start: 2021-11-05 | End: 2022-06-27 | Stop reason: SDUPTHER

## 2021-11-09 ENCOUNTER — TELEPHONE (OUTPATIENT)
Dept: PAIN MEDICINE | Facility: CLINIC | Age: 51
End: 2021-11-09
Payer: COMMERCIAL

## 2021-11-09 DIAGNOSIS — M54.16 LUMBAR RADICULOPATHY, CHRONIC: ICD-10-CM

## 2021-11-23 ENCOUNTER — HOSPITAL ENCOUNTER (OUTPATIENT)
Dept: RADIOLOGY | Facility: OTHER | Age: 51
Discharge: HOME | End: 2021-11-23
Attending: NURSE PRACTITIONER
Payer: COMMERCIAL

## 2021-11-23 DIAGNOSIS — M54.16 LUMBAR RADICULOPATHY, CHRONIC: ICD-10-CM

## 2021-11-23 PROCEDURE — 72148 MRI LUMBAR SPINE WITHOUT CONTRAST: ICD-10-PCS | Mod: 26,,, | Performed by: INTERNAL MEDICINE

## 2021-11-23 PROCEDURE — 72148 MRI LUMBAR SPINE W/O DYE: CPT | Mod: 26,,, | Performed by: INTERNAL MEDICINE

## 2021-11-23 PROCEDURE — 72148 MRI LUMBAR SPINE W/O DYE: CPT | Mod: TC

## 2021-11-24 ENCOUNTER — TELEPHONE (OUTPATIENT)
Dept: PAIN MEDICINE | Facility: CLINIC | Age: 51
End: 2021-11-24
Payer: COMMERCIAL

## 2021-12-27 ENCOUNTER — TELEPHONE (OUTPATIENT)
Dept: PAIN MEDICINE | Facility: CLINIC | Age: 51
End: 2021-12-27
Payer: COMMERCIAL

## 2022-01-03 ENCOUNTER — PATIENT MESSAGE (OUTPATIENT)
Dept: PAIN MEDICINE | Facility: OTHER | Age: 52
End: 2022-01-03

## 2022-01-11 ENCOUNTER — PATIENT MESSAGE (OUTPATIENT)
Dept: PAIN MEDICINE | Facility: OTHER | Age: 52
End: 2022-01-11

## 2022-01-18 ENCOUNTER — TELEPHONE (OUTPATIENT)
Dept: PAIN MEDICINE | Facility: OTHER | Age: 52
End: 2022-01-18

## 2022-01-26 ENCOUNTER — HOSPITAL ENCOUNTER (OUTPATIENT)
Facility: OTHER | Age: 52
Discharge: HOME | End: 2022-01-26
Attending: ANESTHESIOLOGY | Admitting: ANESTHESIOLOGY
Payer: MEDICAID

## 2022-01-26 VITALS
HEART RATE: 73 BPM | RESPIRATION RATE: 16 BRPM | OXYGEN SATURATION: 98 % | SYSTOLIC BLOOD PRESSURE: 132 MMHG | TEMPERATURE: 98 F | BODY MASS INDEX: 33.13 KG/M2 | HEIGHT: 73 IN | WEIGHT: 250 LBS | DIASTOLIC BLOOD PRESSURE: 70 MMHG

## 2022-01-26 DIAGNOSIS — M51.36 DDD (DEGENERATIVE DISC DISEASE), LUMBAR: Primary | ICD-10-CM

## 2022-01-26 DIAGNOSIS — M54.16 LUMBAR RADICULOPATHY: ICD-10-CM

## 2022-01-26 DIAGNOSIS — M54.17 LUMBOSACRAL RADICULOPATHY: ICD-10-CM

## 2022-01-26 PROCEDURE — 25000003 PHARM REV CODE 250: Performed by: STUDENT IN AN ORGANIZED HEALTH CARE EDUCATION/TRAINING PROGRAM

## 2022-01-26 PROCEDURE — A4216 STERILE WATER/SALINE, 10 ML: HCPCS | Performed by: ANESTHESIOLOGY

## 2022-01-26 PROCEDURE — 25500020 PHARM REV CODE 255: Performed by: ANESTHESIOLOGY

## 2022-01-26 PROCEDURE — 63600175 PHARM REV CODE 636 W HCPCS: Performed by: ANESTHESIOLOGY

## 2022-01-26 PROCEDURE — 62323 NJX INTERLAMINAR LMBR/SAC: CPT | Mod: ,,, | Performed by: ANESTHESIOLOGY

## 2022-01-26 PROCEDURE — 62323 NJX INTERLAMINAR LMBR/SAC: CPT | Performed by: ANESTHESIOLOGY

## 2022-01-26 PROCEDURE — 25000003 PHARM REV CODE 250: Performed by: ANESTHESIOLOGY

## 2022-01-26 PROCEDURE — 62323 PR INJ LUMBAR/SACRAL, W/IMAGING GUIDANCE: ICD-10-PCS | Mod: ,,, | Performed by: ANESTHESIOLOGY

## 2022-01-26 RX ORDER — SODIUM CHLORIDE 9 MG/ML
INJECTION, SOLUTION INTRAMUSCULAR; INTRAVENOUS; SUBCUTANEOUS
Status: DISCONTINUED | OUTPATIENT
Start: 2022-01-26 | End: 2022-01-26 | Stop reason: HOSPADM

## 2022-01-26 RX ORDER — LIDOCAINE HYDROCHLORIDE 20 MG/ML
INJECTION, SOLUTION INFILTRATION; PERINEURAL
Status: DISCONTINUED | OUTPATIENT
Start: 2022-01-26 | End: 2022-01-26 | Stop reason: HOSPADM

## 2022-01-26 RX ORDER — LIDOCAINE HYDROCHLORIDE 10 MG/ML
INJECTION, SOLUTION EPIDURAL; INFILTRATION; INTRACAUDAL; PERINEURAL
Status: DISCONTINUED | OUTPATIENT
Start: 2022-01-26 | End: 2022-01-26 | Stop reason: HOSPADM

## 2022-01-26 RX ORDER — DEXAMETHASONE SODIUM PHOSPHATE 10 MG/ML
INJECTION INTRAMUSCULAR; INTRAVENOUS
Status: DISCONTINUED | OUTPATIENT
Start: 2022-01-26 | End: 2022-01-26 | Stop reason: HOSPADM

## 2022-01-26 RX ORDER — SODIUM CHLORIDE 9 MG/ML
INJECTION, SOLUTION INTRAVENOUS CONTINUOUS
Status: ACTIVE | OUTPATIENT
Start: 2022-01-26

## 2022-01-26 RX ORDER — MIDAZOLAM HYDROCHLORIDE 1 MG/ML
INJECTION INTRAMUSCULAR; INTRAVENOUS
Status: DISCONTINUED | OUTPATIENT
Start: 2022-01-26 | End: 2022-01-26 | Stop reason: HOSPADM

## 2022-01-26 RX ORDER — SODIUM CHLORIDE 9 MG/ML
INJECTION, SOLUTION INTRAVENOUS CONTINUOUS
Status: DISCONTINUED | OUTPATIENT
Start: 2022-01-26 | End: 2022-01-26 | Stop reason: HOSPADM

## 2022-01-26 RX ADMIN — SODIUM CHLORIDE: 0.9 INJECTION, SOLUTION INTRAVENOUS at 01:01

## 2022-01-26 NOTE — DISCHARGE SUMMARY
Discharge Note  Short Stay      SUMMARY     Admit Date: 1/26/2022    Attending Physician: Lew Vizcarra      Discharge Physician: Lew Vizcarra      Discharge Date: 1/26/2022 2:17 PM    Procedure(s) (LRB):  INJECTION, STEROID, EPIDURAL  L4/5 IL [place] NEEDS CONSENT (N/A)    Final Diagnosis: Lumbar radiculopathy [M54.16]    Disposition: Home or self care    Patient Instructions:   Current Discharge Medication List      CONTINUE these medications which have NOT CHANGED    Details   cyclobenzaprine (FLEXERIL) 10 MG tablet       dextroamphetamine-amphetamine (ADDERALL) 15 mg tablet       DULoxetine (CYMBALTA) 20 MG capsule       gabapentin (NEURONTIN) 600 MG tablet Take 2 tablets (1,200 mg total) by mouth 3 (three) times daily.  Qty: 180 tablet, Refills: 5    Associated Diagnoses: Lumbar radiculopathy      HYDROcodone-acetaminophen (NORCO)  mg per tablet       icosapent ethyL (VASCEPA) 1 gram Cap Take 4 capsules by mouth once daily.      nabumetone (RELAFEN) 500 MG tablet Take 500 mg by mouth 2 (two) times daily.      omega-3 acid ethyl esters (LOVAZA) 1 gram capsule       rosuvastatin (CRESTOR) 10 MG tablet Take 10 mg by mouth once daily.                 Discharge Diagnosis: Lumbar radiculopathy [M54.16]  Condition on Discharge: Stable with no complications to procedure   Diet on Discharge: Same as before.  Activity: as per instruction sheet.  Discharge to: Home with a responsible adult.  Follow up: 2-4 weeks       Please call my office or pager at [phone] if experienced any weakness or loss of sensation, fever > 101.5, pain uncontrolled with oral medications, persistent nausea/vomiting/or diarrhea, redness or drainage from the incisions, or any other worrisome concerns. If physician on call was not reached or could not communicate with our office for any reason please go to the nearest emergency department

## 2022-01-26 NOTE — OP NOTE
Lumbar Interlaminar Epidural Steroid Injection under Fluoroscopic Guidance    The procedure, risks, benefits, and options were discussed with the patient. There are no contraindications to the procedure. The patent expressed understanding and agreed to the procedure. Informed written consent was obtained prior to the start of the procedure and can be found in the patient's chart.    PATIENT NAME: Jhony Matute   MRN: [id number]     DATE OF PROCEDURE: 01/26/2022    PROCEDURE: Lumbar Interlaminar Epidural Steroid Injection L4/L5 under Fluoroscopic Guidance    PRE-OP DIAGNOSIS: Lumbar radiculopathy [M54.16]  DDD      POST-OP DIAGNOSIS: Same    PHYSICIAN: Tay Nicholson MD    ASSISTANTS: Muddassir Sana, M.D. [facility] Fellow     MEDICATIONS INJECTED: Preservative-free Decadron 10mg with 4cc of Lidocaine 1% MPF and preservative free normal saline    LOCAL ANESTHETIC INJECTED: Xylocaine 2%     SEDATION: None    ESTIMATED BLOOD LOSS: None    COMPLICATIONS: None    TECHNIQUE: Time-out was performed to identify the patient and procedure to be performed. With the patient laying in a prone position, the surgical area was prepped and draped in the usual sterile fashion using ChloraPrep and a fenestrated drape. The level was determined under fluoroscopy guidance. Skin anesthesia was achieved by injecting Lidocaine 2% over the injection site. The interlaminar space was then approached with a 18 gauge,  3.5 inch Tuohy needle that was introduced under fluoroscopic guidance in the AP, lateral and/or contralateral oblique imaging. Once the Ligamentum flavum was encountered loss of resistance to saline was used to enter the epidural space. With positive loss of resistance and negative aspiration for CSF or Blood, contrast dye Omnipaque (300mg/ml) was injected to confirm placement and there was no vascular runoff. 5 mL of the medication mixture listed above was injected slowly. Displacement of the radio opaque contrast after  injection of the medication confirmed that the medication went into the area of the epidural space. The needles were removed and bleeding was nil. A sterile dressing was applied. No specimens collected. The patient tolerated the procedure well.       The patient was monitored after the procedure in the recovery area. They were given post-procedure and discharge instructions to follow at home. The patient was discharged in a stable condition.    Lew Vizcarra MD

## 2022-01-26 NOTE — DISCHARGE INSTRUCTIONS
Thank you for allowing us to care for you today. You may receive a survey about the care we provided. Your feedback is valuable and helps us provide excellent care throughout the community.     Home Care Instructions for Pain Management:    1. DIET:   You may resume your normal diet today.   2. BATHING:   You may shower with luke warm water. No tub baths or anything that will soak injection sites under water for the next 24 hours.  3. DRESSING:   You may remove your bandage today.   4. ACTIVITY LEVEL:   You may resume your normal activities 24 hrs after your procedure. Nothing strenuous today.  5. MEDICATIONS:   You may resume your normal medications today. To restart blood thinners, ask your doctor.  6. DRIVING    If you have received any sedatives by mouth today, you may not drive for 12 hours.    If you have received any sedation through your IV, you may not drive for 24 hrs.   7. SPECIAL INSTRUCTIONS:   No heat to the injection site for 24 hrs including, hot bath or shower, heating pad, moist heat, or hot tubs.    Use ice pack to injection site for any pain or discomfort.  Apply ice packs for 20 minute intervals as needed.    IF you have diabetes, be sure to monitor your blood sugar more closely. IF your injection contained steroids your blood sugar levels may become higher than normal.    If you are still having pain upon discharge:  Your pain may improve over the next 48 hours. The anesthetic (numbing medication) works immediately to 48 hours. IF your injection contained a steroid (anti-inflammatory medication), it takes approximately 3 days to start feeling relief and 7-10 days to see your greatest results from the medication. It is possible you may need subsequent injections. This would be discussed at your follow up appointment with pain management or your referring doctor.    Please call the PAIN MANAGEMENT office at [phone] or ON CALL pager at [phone] if you experienced any:   -Weakness or  loss of sensation  -Fever > 101.5  -Pain uncontrolled with oral medications   -Persistent nausea, vomiting, or diarrhea  -Redness or drainage from the injection sites, or any other worrisome concerns.   If physician on call was not reached or could not communicate with our office for any reason please go to the nearest emergency department.  Patient Education       Moderate Sedation in Adults Discharge Instructions   About this topic   Moderate sedation is also known as conscious sedation. It changes your state of being awake or consciousness. With this sedation, you may feel slight pain or pressure during a procedure. The drugs help you to relax and may even allow you to sleep. It will be easy to wake you and you may talk and answer questions while under sedation. Most likely, you will not remember what happens while under this sedation.  What care is needed at home?   · Ask your doctor what you need to do when you go home. Make sure you understand everything the doctor says. This way you will know what you need to do.  · You will not be allowed to drive right away after the procedure. Ask a family member or a friend to drive you home.  · Do not operate heavy or dangerous machinery for at least 24 hours.  · Do not make major decisions or sign important papers for at least 24 hours. You may not be thinking clearly.  · Avoid beer, wine, or mixed drinks (alcohol) for at least 24 hours.  · You are at a higher risk of falling for at least 24 hours after moderate sedation.  ? Take extra care when you get up.  ? Do not change positions quickly.  ? Do not rush when you need to go to the bathroom or to answer the phone.  ? Ask for help if you feel unsteady when you try to walk.  ? Wear shoes with non-slip soles and low heels.  What follow-up care is needed?   Your doctor may ask you to make visits to the office to check on your progress. Be sure to keep these visits. Your doctor may also refer you to other doctors or tell  you that you need more tests or care.  What drugs may be needed?   The doctor may order drugs to:  · Help with pain  · Treat an upset stomach or throwing up  Will physical activity be limited?   Rest for the day of the procedure. Avoid strenuous activities like heavy lifting and hard exercise. Talk to your doctor about whether you need to limit lifting or exercise after your procedure.  What changes to diet are needed?   Start with a light diet when you are fully awake. This includes things that are easy to swallow like soups, pudding, jello, toast, and eggs. Slowly progress to your normal diet.  What problems could happen?   · Low blood pressure  · Breathing problems  · Upset stomach or throwing up  · Dizziness  When do I need to call the doctor?   · Feel dizzy, weak, or tired  · Faint  · Very bad headache  · Upset stomach or throwing up  · To follow up for more tests or care  Teach Back: Helping You Understand   The Teach Back Method helps you understand the information we are giving you. After you talk with the staff, tell them in your own words what you learned. This helps to make sure the staff has described each thing clearly. It also helps to explain things that may have been confusing. Before going home, make sure you can do these:  · I can tell you about my procedure.  · I can tell you if I need more tests or care.  · I can tell you what is good for me to eat and drink the next day.  · I can tell you what I would do if I feel dizzy, weak, or tired.  Last Reviewed Date   2020-03-02  Consumer Information Use and Disclaimer   This information is not specific medical advice and does not replace information you receive from your health care provider. This is only a brief summary of general information. It does NOT include all information about conditions, illnesses, injuries, tests, procedures, treatments, therapies, discharge instructions or life-style choices that may apply to you. You must talk with your  health care provider for complete information about your health and treatment options. This information should not be used to decide whether or not to accept your health care providers advice, instructions or recommendations. Only your health care provider has the knowledge and training to provide advice that is right for you.  Copyright   Copyright © 2021 [employer] and its affiliates and/or licensors. All rights reserved.

## 2022-01-26 NOTE — H&P
"HPI  Patient presenting for Procedure(s) (LRB):  INJECTION, STEROID, EPIDURAL  L4/5 IL [place] NEEDS CONSENT (N/A)     Patient on Anti-coagulation No    No health changes since previous encounter    Past Medical History:   Diagnosis Date    Hypertension      Past Surgical History:   Procedure Laterality Date    TRANSFORAMINAL EPIDURAL INJECTION OF STEROID Right 11/15/2019    Procedure: LUMBAR TRANSFORAMINAL RIGHT L4/5 AND L5/S1 TF [place];  Surgeon: Michael Montes MD;  Location: [facility];  Service: Pain Management;  Laterality: Right;  NEEDS CONSENT    TRANSFORAMINAL EPIDURAL INJECTION OF STEROID Right 2/27/2020    Procedure: INJECTION, STEROID, EPIDURAL, TRANSFORAMINAL APPROACH, L2-L3 AND L4-L5;  Surgeon: Shon Brunner MD;  Location: [facility];  Service: Pain Management;  Laterality: Right;    TRANSFORAMINAL EPIDURAL INJECTION OF STEROID Right 10/19/2021    Procedure: Injection,steroid,epidural,transforaminal RIGHT L4/5 and L5/S1;  Surgeon: Shon Brunner MD;  Location: [facility];  Service: Pain Management;  Laterality: Right;  9/16 will call to r/s house damage  9/15 CB AFTER 9/27     Review of patient's allergies indicates:  No Known Allergies   Current Facility-Administered Medications   Medication    0.9%  NaCl infusion    0.9%  NaCl infusion       PMHx, PSHx, Allergies, Medications reviewed in epic    ROS negative except pain complaints in HPI    OBJECTIVE:    /73   Pulse 73   Temp 98.4 °F (36.9 °C) (Oral)   Resp 18   Ht 6' 1" (1.854 m)   Wt 113.4 kg (250 lb)   SpO2 98%   BMI 32.98 kg/m²     PHYSICAL EXAMINATION:    GENERAL: Well appearing, in no acute distress, alert and oriented x3.  PSYCH:  Mood and affect appropriate.  SKIN: Skin color, texture, turgor normal, no rashes or lesions which will impact the procedure.  CV: RRR with palpation of the radial artery.  PULM: No evidence of respiratory difficulty, symmetric chest rise. Clear to auscultation.  NEURO: Cranial nerves " grossly intact.    Plan:    Proceed with procedure as planned Procedure(s) (LRB):  INJECTION, STEROID, EPIDURAL  L4/5 IL [place] NEEDS CONSENT (N/A)    Lew Vizcarra  01/26/2022

## 2022-02-17 ENCOUNTER — TELEPHONE (OUTPATIENT)
Dept: PAIN MEDICINE | Facility: CLINIC | Age: 52
End: 2022-02-17
Payer: COMMERCIAL

## 2022-02-17 NOTE — TELEPHONE ENCOUNTER
This message is for patient in regards to his/her appointment 02/18/22 at 02:20p       [facility] Policy: For the safety of all patients and staff members.     Patient Visitor policy: Due to social distancing and limited seating staff are requesting patient to arrive to their schedule appointments on time. During this visit we're asking all patients to only have one visitor over the age of 18yrs old to accompany to be seen by Irina Echeverria np. If patient do not required assistance with their visit, we're asking all visitors to remain outside the waiting area.    Upon arriving to your schedule appointment; patients are required to wear a face mask, if patient do not have a face mask one will be provided entering the facility. We ask patients to contact clinical staff at this number [phone] to notify staff that they have arrived or they may do so by utilizing the Mobile checked in [facility](if patient have patient portal; clinical staff will send a message through there letting them know it's okay to proceed to their visit). If you have any questions or concerns please contact [phone]    Patient verbalized understanding

## 2022-02-23 ENCOUNTER — TELEPHONE (OUTPATIENT)
Dept: PAIN MEDICINE | Facility: CLINIC | Age: 52
End: 2022-02-23
Payer: COMMERCIAL

## 2022-02-23 NOTE — TELEPHONE ENCOUNTER
This message is for patient in regards to his/her appointment 02/24/22 at 09:40a       [facility] Policy: For the safety of all patients and staff members.     Patient Visitor policy: Due to social distancing and limited seating staff are requesting patient to arrive to their schedule appointments on time. During this visit we're asking all patients to only have one visitor over the age of 18yrs old to accompany to be seen by Irina Vital np. If patient do not required assistance with their visit, we're asking all visitors to remain outside the waiting area.    Upon arriving to your schedule appointment; patients are required to wear a face mask, if patient do not have a face mask one will be provided entering the facility. We ask patients to contact clinical staff at this number [phone] to notify staff that they have arrived or they may do so by utilizing the Mobile checked in [facility](if patient have patient portal; clinical staff will send a message through there letting them know it's okay to proceed to their visit). If you have any questions or concerns please contact [phone]    Patient verbalized understanding

## 2022-02-24 ENCOUNTER — OFFICE VISIT (OUTPATIENT)
Dept: PAIN MEDICINE | Facility: CLINIC | Age: 52
End: 2022-02-24
Payer: COMMERCIAL

## 2022-02-24 VITALS
WEIGHT: 253 LBS | RESPIRATION RATE: 18 BRPM | SYSTOLIC BLOOD PRESSURE: 127 MMHG | DIASTOLIC BLOOD PRESSURE: 80 MMHG | BODY MASS INDEX: 33.53 KG/M2 | OXYGEN SATURATION: 100 % | HEIGHT: 73 IN | TEMPERATURE: 98 F | HEART RATE: 66 BPM

## 2022-02-24 DIAGNOSIS — G89.4 CHRONIC PAIN DISORDER: ICD-10-CM

## 2022-02-24 DIAGNOSIS — M47.816 LUMBAR SPONDYLOSIS: ICD-10-CM

## 2022-02-24 DIAGNOSIS — M51.36 DDD (DEGENERATIVE DISC DISEASE), LUMBAR: ICD-10-CM

## 2022-02-24 DIAGNOSIS — M54.16 LUMBAR RADICULOPATHY: ICD-10-CM

## 2022-02-24 DIAGNOSIS — M53.3 SACROILIAC JOINT PAIN: ICD-10-CM

## 2022-02-24 DIAGNOSIS — M54.17 LUMBOSACRAL RADICULOPATHY: Primary | ICD-10-CM

## 2022-02-24 PROCEDURE — 3079F DIAST BP 80-89 MM HG: CPT | Mod: CPTII,S$GLB,, | Performed by: NURSE PRACTITIONER

## 2022-02-24 PROCEDURE — 3074F SYST BP LT 130 MM HG: CPT | Mod: CPTII,S$GLB,, | Performed by: NURSE PRACTITIONER

## 2022-02-24 PROCEDURE — 1160F RVW MEDS BY RX/DR IN RCRD: CPT | Mod: CPTII,S$GLB,, | Performed by: NURSE PRACTITIONER

## 2022-02-24 PROCEDURE — 1159F PR MEDICATION LIST DOCUMENTED IN MEDICAL RECORD: ICD-10-PCS | Mod: CPTII,S$GLB,, | Performed by: NURSE PRACTITIONER

## 2022-02-24 PROCEDURE — 99213 OFFICE O/P EST LOW 20 MIN: CPT | Mod: S$GLB,,, | Performed by: NURSE PRACTITIONER

## 2022-02-24 PROCEDURE — 3079F PR MOST RECENT DIASTOLIC BLOOD PRESSURE 80-89 MM HG: ICD-10-PCS | Mod: CPTII,S$GLB,, | Performed by: NURSE PRACTITIONER

## 2022-02-24 PROCEDURE — 3074F PR MOST RECENT SYSTOLIC BLOOD PRESSURE < 130 MM HG: ICD-10-PCS | Mod: CPTII,S$GLB,, | Performed by: NURSE PRACTITIONER

## 2022-02-24 PROCEDURE — 1159F MED LIST DOCD IN RCRD: CPT | Mod: CPTII,S$GLB,, | Performed by: NURSE PRACTITIONER

## 2022-02-24 PROCEDURE — 3008F PR BODY MASS INDEX (BMI) DOCUMENTED: ICD-10-PCS | Mod: CPTII,S$GLB,, | Performed by: NURSE PRACTITIONER

## 2022-02-24 PROCEDURE — 99213 PR OFFICE/OUTPT VISIT, EST, LEVL III, 20-29 MIN: ICD-10-PCS | Mod: S$GLB,,, | Performed by: NURSE PRACTITIONER

## 2022-02-24 PROCEDURE — 1160F PR REVIEW ALL MEDS BY PRESCRIBER/CLIN PHARMACIST DOCUMENTED: ICD-10-PCS | Mod: CPTII,S$GLB,, | Performed by: NURSE PRACTITIONER

## 2022-02-24 PROCEDURE — 99999 PR PBB SHADOW E&M-EST. PATIENT-LVL V: ICD-10-PCS | Mod: PBBFAC,,, | Performed by: NURSE PRACTITIONER

## 2022-02-24 PROCEDURE — 99999 PR PBB SHADOW E&M-EST. PATIENT-LVL V: CPT | Mod: PBBFAC,,, | Performed by: NURSE PRACTITIONER

## 2022-02-24 PROCEDURE — 3008F BODY MASS INDEX DOCD: CPT | Mod: CPTII,S$GLB,, | Performed by: NURSE PRACTITIONER

## 2022-02-24 NOTE — PROGRESS NOTES
Chronic Pain - Follow Up    Referring Physician: No ref. provider found    Chief Complaint:   Chief Complaint   Patient presents with    Leg Pain    Low-back Pain        SUBJECTIVE: Disclaimer: This note has been generated using voice-recognition software. There may be typographical errors that have been missed during proof-reading      Interval History 2/24/2022:  The patient returns to clinic today for follow up of low back pain. He is s/p L4/5 IL [place] on 1/26/2022. He reports no relief, in fact his pain worsened after the procedure. He continues to report low back pain that radiates into the lateral aspect of both legs to his heels. He describes this pain as shocking in nature. His pain is worse with prolonged standing and walking. He is frustrated with his continued pain. He continues to take Gabapentin, Cymbalta, and Nabumetone. He has completed physical therapy in the past without relief. He denies any other health changes. His pain today is 10/10.     Interval History 11/5/2021:  The patient returns to clinic today for follow up of low back pain. He is s/p right L4/5 and L5/S1 TF [place] on 10/19/2021. He reports limited relief of his pain. He continues to report low back pain that radiates into the lateral aspect of his right leg to under his foot. He does report numbness to the bottom of both feet. His pain is worse with prolonged standing and walking. He also reports difficulty sleeping due to pain. He does feel as though his right leg will give out. His wife reports that he is depressed. He reports episodes of urinary incontinence without the urge to go. He has not seen Urology. He does have an upcoming appointment with his PCP. He denies any other health changes. His pain today is 8/10.    Interval History 6/22/21:  Recently treated for acute cystitis, symptoms resolved.  Continues to use cymbalta 30mg / day gabapentin 600mg TID.  Continues to have substantial lower back pain and right lower extremity  radiculopathy.  He has previously been in physical therapy before COVID and had to discontinue formal physical therapy but was able to continue doing exercise program on his own at home but continued to have substantial lower back pain and radicular symptoms at this time.      Interval history 09/16/2020:  Since previous encounter the patient has been taking gabapentin 1800 mg per day without any side effects and some improvement in his symptoms, and we will previously scheduled to perform a the epidural injection for his lower extremity radicular pain but was canceled secondary to COVID.  Currently he continues to have bilateral lower extremity radicular pain symptoms worse on the right.  Previous EMG showed mild chronic bilateral lower extremity radicular pains     Interval History 4/7/20:  Follow up for lumbar radicular pain. Patient's scheduled L5-S1 ILESI has been postponed due to covid outbreak.He continues to have lower back pain with bilateral radicular pain to his heels. His gabapentin was increased to 600 mg tid since last visit. He notices slight improvement in pain since increasing gabapentin. He also takes Norco 10 tid prn that is prescribed by his PCP. He is also taking Cymbalta 20 mg qday.     Interval history 03/11/2020:  Since previous encounter the patient is status post right-sided L2-3 and L4-5 transforaminal epidural steroid injections on 02/27/2020 he had about 30% improvement from the previous injection, he has also had some improvement from increasing his gabapentin to 300 mg t.i.d..  He does not have any side effects to this medication.  He continues to have weakness in his right lower extremity continues to have radicular pain symptoms, he is undergoing physical therapy without any worsening of his symptoms or significant improvement.  He needs paperwork filled out for FMLA for his with job.    Initial encounter:    Jhony LINDA Matute presents to the clinic for the evaluation of lower back pain.  "The pain started several eyars ago following no inciting event and symptoms have been unchanged.    Brief history:    Pain Description:    The pain is located in the lumbosacral area and radiates to the BLE to his heels. For several years he has had numbness of his Right lateral thigh. He endorses weakness in his thighs after prolonged sitting and sometimes feels he needs to prop himself up at his desk.     At BEST  8/10     At WORST  10/10 on the WORST day.      On average pain is rated as 10/10.     Today the pain is rated as 9/10    The pain is described as numbing, sharp and shooting      Symptoms interfere with daily activity, sleeping and work.     Exacerbating factors: Sitting, Bending and Walking.      Mitigating factors medications.     Patient denies night fever/night sweats, urinary incontinence, bowel incontinence and significant weight loss.  Patient denies any suicidal or homicidal ideations    Pain Medications:  Current:  Gabapentin  Flexeril  Nabumetone  Cymbalta  Norco    Tried in Past:  NSAIDs -nabumetone  TCA -Never  SNRI -Never  Anti-convulsants -gabapentin  Muscle Relaxants -flexeril  Opioids-norco    Physical Therapy/Home Exercise: yes (in 2015 or 2016) "did not help much"     report:  Reviewed and consistent with medication use as prescribed.    Pain Procedures:   -11/15/19 R L4-5 and L5-S1 TFESI  - 3 other ESIs over past 3 years in [place]  02/27/2020-right L2-3 and L4-5 transforaminal epidural steroid  10/19/2021- Right L4/5 and L5/S1 TF [place]  1/26/2022- L4/5 IL [place]    Chiropractor -never  Acupuncture - never  TENS unit -never  Spinal decompression -never  Joint replacement -never    Imaging:   MRI Lumbar Spine 11/23/2021:  COMPARISON:  Lumbar spine MRI 01/17/2020     FINDINGS:  Vertebral alignment is normal.     No compression fractures.  No marrow replacing lesions.     Multilevel degenerative changes with disc desiccation and disc space narrowing, described in detail below.  No " significant bone marrow edema.     The conus medullaris has a normal appearance and terminates at the L1 level.  Cauda equina nerve roots are unremarkable.     Paraspinal soft tissues are unremarkable.     SIGNIFICANT FINDINGS BY LEVEL:     T12-L1: Mild disc bulge, eccentric to the left.  No canal stenosis.  Mild left foraminal stenosis.     L1-2: Disc bulge.  No canal stenosis.  Mild bilateral foraminal stenosis.     L2-3: Disc bulge.  Mild bilateral facet arthropathy.  No canal stenosis.  Mild bilateral foraminal stenosis.     L3-4: Disc bulge.  Mild bilateral facet arthropathy.  No canal stenosis.  Mild-moderate foraminal stenosis bilaterally.     L4-5: Disc bulge.  Mild bilateral facet arthropathy.  No canal stenosis.  Moderate bilateral foraminal stenosis.     L5-S1: Disc bulge.  Bilateral facet arthropathy.  No canal stenosis.  Mild bilateral foraminal stenosis.     Impression:     Progressive multilevel degenerative changes, most advanced at L4-5 where there is moderate bilateral foraminal stenosis.    1/14/20 MRI Lumbar   FINDINGS:  Alignment: Alignment is within normal limits.    Vertebrae: Vertebral body heights are well maintained with no evidence of fracture or marrow replacement process.    Discs: Multilevel disc desiccation with maintained disc heights.    Cord: Distal cord is normal in contour and signal intensity.  Conus terminates at L1.    Degenerative findings:    T12-L1: Circumferential disc bulge with left foraminal zone protrusion resulting in mild left neural foraminal narrowing.    L1-L2: Circumferential disc bulge results in mild right neural foraminal narrowing.    L2-L3: Circumferential disc bulge and mild facet arthropathy result in mild right neural foraminal narrowing.    L3-L4: Circumferential disc bulge and mild facet arthropathy noted.  No spinal canal stenosis or neural foraminal narrowing.    L4-L5: Circumferential disc bulge and mild facet arthropathy result in mild bilateral  neural foraminal narrowing.    L5-S1: Mild facet arthropathy noted.  No spinal canal stenosis or neural foraminal narrowing.    Paraspinal muscles & soft tissues: Unremarkable.    Partially visualized abdominal organs are unremarkable.    Sacroiliac joints are unremarkable.  Small subcentimeter T1/T2 hypointense lesion in the right inferior iliac bone, favoring a bone island.      Impression       Mild multilevel degenerative changes of the lumbar spine resulting in mild neural foraminal narrowing.     9/18/19 EMG BLE:  bilateral L4-S1 radiculopathy.      Past Medical History:   Diagnosis Date    Hypertension      Past Surgical History:   Procedure Laterality Date    EPIDURAL STEROID INJECTION N/A 1/26/2022    Procedure: INJECTION, STEROID, EPIDURAL  L4/5 IL [place] NEEDS CONSENT;  Surgeon: Tay Nicholson MD;  Location: [facility];  Service: Pain Management;  Laterality: N/A;  1/12 RESCHEDULE    TRANSFORAMINAL EPIDURAL INJECTION OF STEROID Right 11/15/2019    Procedure: LUMBAR TRANSFORAMINAL RIGHT L4/5 AND L5/S1 TF [place];  Surgeon: Michael Montes MD;  Location: [facility];  Service: Pain Management;  Laterality: Right;  NEEDS CONSENT    TRANSFORAMINAL EPIDURAL INJECTION OF STEROID Right 2/27/2020    Procedure: INJECTION, STEROID, EPIDURAL, TRANSFORAMINAL APPROACH, L2-L3 AND L4-L5;  Surgeon: Shon Brunner MD;  Location: [facility];  Service: Pain Management;  Laterality: Right;    TRANSFORAMINAL EPIDURAL INJECTION OF STEROID Right 10/19/2021    Procedure: Injection,steroid,epidural,transforaminal RIGHT L4/5 and L5/S1;  Surgeon: Shon Brunner MD;  Location: [facility];  Service: Pain Management;  Laterality: Right;  9/16 will call to r/s house damage  9/15 CB AFTER 9/27     Social History     Socioeconomic History    Marital status:    Tobacco Use    Smoking status: Never Smoker    Smokeless tobacco: Never Used   Substance and Sexual Activity    Alcohol use: No     Comment:  socially    Drug use: No    Sexual activity: Yes     Partners: Female     Family History   Problem Relation Age of Onset    Stroke Mother     Stroke Father     Heart attack Father        Review of patient's allergies indicates:  No Known Allergies    Current Outpatient Medications   Medication Sig    cyclobenzaprine (FLEXERIL) 10 MG tablet     dextroamphetamine-amphetamine (ADDERALL) 15 mg tablet     DULoxetine (CYMBALTA) 20 MG capsule     gabapentin (NEURONTIN) 600 MG tablet Take 2 tablets (1,200 mg total) by mouth 3 (three) times daily.    HYDROcodone-acetaminophen (NORCO)  mg per tablet     icosapent ethyL (VASCEPA) 1 gram Cap Take 4 capsules by mouth once daily.    nabumetone (RELAFEN) 500 MG tablet Take 500 mg by mouth 2 (two) times daily.    omega-3 acid ethyl esters (LOVAZA) 1 gram capsule     rosuvastatin (CRESTOR) 10 MG tablet Take 10 mg by mouth once daily.     No current facility-administered medications for this visit.     Facility-Administered Medications Ordered in Other Visits   Medication    0.9%  NaCl infusion       REVIEW OF SYSTEMS:    GENERAL:  No weight loss, malaise or fevers.  HEENT:   No recent changes in vision or hearing  NECK:  Negative for lumps, no difficulty with swallowing.  RESPIRATORY:  Negative for cough, wheezing or shortness of breath, patient denies any recent URI.  CARDIOVASCULAR:  Negative for chest pain, leg swelling or palpitations.  GI:  Negative for abdominal discomfort, blood in stools or black stools or change in bowel habits.  MUSCULOSKELETAL:  See HPI.  SKIN:  Negative for lesions, rash, and itching.  PSYCH:  No mood disorder or recent psychosocial stressors.  Sleep disorder  HEMATOLOGY/LYMPHOLOGY:  Negative for prolonged bleeding, bruising easily or swollen nodes.  Patient is not currently taking any anti-coagulants  ENDO: No history of diabetes or thyroid dysfunction. Gout  NEURO:   No history of headaches, syncope, paralysis, seizures or  "tremors.  All other reviewed and negative other than HPI.    OBJECTIVE:    /80   Pulse 66   Temp 97.5 °F (36.4 °C) (Oral)   Resp 18   Ht 6' 1" (1.854 m)   Wt 114.8 kg (253 lb)   SpO2 100%   BMI 33.38 kg/m²       PHYSICAL EXAMINATION:    GENERAL: Well appearing, in no acute distress, alert and oriented x3.  PSYCH:  Mood and affect appropriate.  SKIN: Skin color, texture, turgor normal, no rashes or lesions.  HEAD/FACE:  Normocephalic, atraumatic. Cranial nerves grossly intact.  CV: RRR with palpation of the radial artery.  PULM: No evidence of respiratory difficulty, symmetric chest rise.  BACK: Straight leg raise in the sitting position is positive for radicular pain bilaterally. There is pain with palpation over lumbar facet joints bilaterally. Limited ROM with pain on flexion and extension. Positive facet loading bilaterally.   EXTREMITIES: No deformities, edema, or skin discoloration. Good capillary refill.  MUSCULOSKELETAL:  There is pain with palpation over the sacroiliac joints bilaterally.  Unable to perform FABERs due to pain. FADIRs test is negative. 4/5 strength in right ankle with plantar and dorsiflexion. 5/5 strength in left ankle with plantar and dorsiflexion. 5/5 strength with right knee flexion and extension. 5/5 strength with left knee flexion and extension. No atrophy or tone abnormalities are noted.  NEURO: Bilateral lower extremity coordination and muscle stretch reflexes are physiologic and symmetric.  Plantar response are downgoing. No clonus.   GAIT: Antalgic- ambulates without assistance.       ASSESSMENT: 51 y.o. year old male with pain, consistent with     Encounter Diagnoses   Name Primary?    Lumbosacral radiculopathy Yes    Lumbar radiculopathy     Lumbar spondylosis     DDD (degenerative disc disease), lumbar     Sacroiliac joint pain     Chronic pain disorder        PLAN:     - We discussed that Dr. Brunner has left [facility]. I will have him establish care with  " Lyn.     - Previous imaging was reviewed and discussed with the patient today.    - He is s/p L4/5 IL [place] without relief.     - Consider lumbar medial branch blocks.     - Consult Neurosurgery.     - I have stressed the importance of physical activity and a home exercise plan to help with pain and improve health.    - Continue Flexeril, Nabumetone, and Cymbalta.     - Continue Gabapentin.     - RTC in 1 month with Dr. Nicholson to establish care.     The above plan and management options were discussed at length with patient. Patient is in agreement with the above and verbalized understanding.     Irina Echeverria  02/24/2022

## 2022-03-16 ENCOUNTER — TELEPHONE (OUTPATIENT)
Dept: PAIN MEDICINE | Facility: CLINIC | Age: 52
End: 2022-03-16
Payer: COMMERCIAL

## 2022-03-16 NOTE — TELEPHONE ENCOUNTER
Spoke to patient. Rescheduled appointment from 03/28 to 04/18. Due to Dr. Nicholson being in surgery that day.

## 2022-03-24 ENCOUNTER — TELEPHONE (OUTPATIENT)
Dept: NEUROSURGERY | Facility: CLINIC | Age: 52
End: 2022-03-24
Payer: COMMERCIAL

## 2022-03-24 ENCOUNTER — TELEPHONE (OUTPATIENT)
Dept: PAIN MEDICINE | Facility: CLINIC | Age: 52
End: 2022-03-24
Payer: COMMERCIAL

## 2022-03-24 NOTE — TELEPHONE ENCOUNTER
----- Message from Jeana Ford sent at 3/24/2022 11:10 AM CDT -----  Contact: Sherif([facility])[phone]  Caller stated patient arrived at [facility] instead of [facility] pt is on the way for his appt.

## 2022-03-24 NOTE — TELEPHONE ENCOUNTER
Staff called and tried to reach out to the patient, however patient didn't answer the phone, so staff lvm to retuen called back to the office. SG

## 2022-03-24 NOTE — TELEPHONE ENCOUNTER
----- Message from Ayesha Fair sent at 3/24/2022  2:23 PM CDT -----  Regarding: PA  Who called: Jeremy from [facility]    What is the request in detail: Patient is requesting a call back. Patient needs a PA for 6 pills for gabapentin (NEURONTIN) 600 MG tablet as the insurance only covers 4. Please contact Pre Authorization at   [phone]  Please advise.    Can the clinic reply by [facility]? No    Best call back number:  [phone]    Additional Information: N/A

## 2022-04-04 ENCOUNTER — TELEPHONE (OUTPATIENT)
Dept: PAIN MEDICINE | Facility: CLINIC | Age: 52
End: 2022-04-04
Payer: COMMERCIAL

## 2022-04-04 NOTE — TELEPHONE ENCOUNTER
Staff lvm informing patient on how the prior authorization process works that we can submit a new script but the pharmacy would have to fax over a prior authorization to the office and once its received we will begin working on prior auth to send in to insurance and from there we have to wait up to 72 hours for a response.

## 2022-04-04 NOTE — TELEPHONE ENCOUNTER
----- Message from Nancy Chandler sent at 4/4/2022 12:04 PM CDT -----    ]  Patient is needing new script for his Gabapentin 600 mg (2) pills - 3 times daily and will be needing a prior authorization    Patient can be contacted @# [phone]      Pharmacy - [facility] on file

## 2022-04-14 ENCOUNTER — TELEPHONE (OUTPATIENT)
Dept: PAIN MEDICINE | Facility: CLINIC | Age: 52
End: 2022-04-14
Payer: COMMERCIAL

## 2022-04-14 NOTE — TELEPHONE ENCOUNTER
Staff spoke with pt in regards to appt on 4/18/22 @ 2:30 pm with  on the 4th floor [address]. Pt verbalized understanding.

## 2022-04-18 ENCOUNTER — OFFICE VISIT (OUTPATIENT)
Dept: SPINE | Facility: CLINIC | Age: 52
End: 2022-04-18
Attending: ANESTHESIOLOGY
Payer: COMMERCIAL

## 2022-04-18 VITALS
RESPIRATION RATE: 18 BRPM | BODY MASS INDEX: 33.6 KG/M2 | HEIGHT: 73 IN | DIASTOLIC BLOOD PRESSURE: 74 MMHG | HEART RATE: 69 BPM | WEIGHT: 253.5 LBS | SYSTOLIC BLOOD PRESSURE: 128 MMHG | TEMPERATURE: 99 F

## 2022-04-18 DIAGNOSIS — R32 URINARY INCONTINENCE, UNSPECIFIED TYPE: ICD-10-CM

## 2022-04-18 DIAGNOSIS — G62.9 NEUROPATHY: ICD-10-CM

## 2022-04-18 DIAGNOSIS — M79.18 MYOFASCIAL PAIN: Primary | ICD-10-CM

## 2022-04-18 DIAGNOSIS — M51.36 DDD (DEGENERATIVE DISC DISEASE), LUMBAR: ICD-10-CM

## 2022-04-18 DIAGNOSIS — M47.892 OTHER SPONDYLOSIS, CERVICAL REGION: ICD-10-CM

## 2022-04-18 PROCEDURE — 3078F PR MOST RECENT DIASTOLIC BLOOD PRESSURE < 80 MM HG: ICD-10-PCS | Mod: CPTII,S$GLB,, | Performed by: ANESTHESIOLOGY

## 2022-04-18 PROCEDURE — 1159F MED LIST DOCD IN RCRD: CPT | Mod: CPTII,S$GLB,, | Performed by: ANESTHESIOLOGY

## 2022-04-18 PROCEDURE — 99214 OFFICE O/P EST MOD 30 MIN: CPT | Mod: S$GLB,,, | Performed by: ANESTHESIOLOGY

## 2022-04-18 PROCEDURE — 1159F PR MEDICATION LIST DOCUMENTED IN MEDICAL RECORD: ICD-10-PCS | Mod: CPTII,S$GLB,, | Performed by: ANESTHESIOLOGY

## 2022-04-18 PROCEDURE — 3008F PR BODY MASS INDEX (BMI) DOCUMENTED: ICD-10-PCS | Mod: CPTII,S$GLB,, | Performed by: ANESTHESIOLOGY

## 2022-04-18 PROCEDURE — 3078F DIAST BP <80 MM HG: CPT | Mod: CPTII,S$GLB,, | Performed by: ANESTHESIOLOGY

## 2022-04-18 PROCEDURE — 99214 PR OFFICE/OUTPT VISIT, EST, LEVL IV, 30-39 MIN: ICD-10-PCS | Mod: S$GLB,,, | Performed by: ANESTHESIOLOGY

## 2022-04-18 PROCEDURE — 3008F BODY MASS INDEX DOCD: CPT | Mod: CPTII,S$GLB,, | Performed by: ANESTHESIOLOGY

## 2022-04-18 PROCEDURE — 99999 PR PBB SHADOW E&M-EST. PATIENT-LVL IV: CPT | Mod: PBBFAC,,, | Performed by: ANESTHESIOLOGY

## 2022-04-18 PROCEDURE — 3074F SYST BP LT 130 MM HG: CPT | Mod: CPTII,S$GLB,, | Performed by: ANESTHESIOLOGY

## 2022-04-18 PROCEDURE — 99999 PR PBB SHADOW E&M-EST. PATIENT-LVL IV: ICD-10-PCS | Mod: PBBFAC,,, | Performed by: ANESTHESIOLOGY

## 2022-04-18 PROCEDURE — 3074F PR MOST RECENT SYSTOLIC BLOOD PRESSURE < 130 MM HG: ICD-10-PCS | Mod: CPTII,S$GLB,, | Performed by: ANESTHESIOLOGY

## 2022-04-18 RX ORDER — CELECOXIB 100 MG/1
100 CAPSULE ORAL 2 TIMES DAILY
Qty: 60 CAPSULE | Refills: 0 | Status: SHIPPED | OUTPATIENT
Start: 2022-04-18 | End: 2022-06-06

## 2022-04-18 NOTE — PROGRESS NOTES
Chronic patient Established Note (Follow up visit)      SUBJECTIVE:  Interval History 4/18/2022:   Patient returns to clinic today for follow-up of chronic low back pain.  He has had multiple [place] with little improvement.  In addition to his back pain, he reports numbness in the lateral aspect of his right thigh with decreased sensation throughout his right lower extremity.  He is currently taking Norco, gabapentin, and Flexeril with no reported side effects.  He also reports episodes of bladder incontinence over the past 1-2 years.  He denies any saddle anesthesia or bowel incontinence.    Interval History 2/24/2022:  The patient returns to clinic today for follow up of low back pain. He is s/p L4/5 IL [place] on 1/26/2022. He reports no relief, in fact his pain worsened after the procedure. He continues to report low back pain that radiates into the lateral aspect of both legs to his heels. He describes this pain as shocking in nature. His pain is worse with prolonged standing and walking. He is frustrated with his continued pain. He continues to take Gabapentin, Cymbalta, and Nabumetone. He has completed physical therapy in the past without relief. He denies any other health changes. His pain today is 10/10.      Interval History 11/5/2021:  The patient returns to clinic today for follow up of low back pain. He is s/p right L4/5 and L5/S1 TF [place] on 10/19/2021. He reports limited relief of his pain. He continues to report low back pain that radiates into the lateral aspect of his right leg to under his foot. He does report numbness to the bottom of both feet. His pain is worse with prolonged standing and walking. He also reports difficulty sleeping due to pain. He does feel as though his right leg will give out. His wife reports that he is depressed. He reports episodes of urinary incontinence without the urge to go. He has not seen Urology. He does have an upcoming appointment with his PCP. He denies any other health  changes. His pain today is 8/10.     Interval History 6/22/21:  Recently treated for acute cystitis, symptoms resolved.  Continues to use cymbalta 30mg / day gabapentin 600mg TID.  Continues to have substantial lower back pain and right lower extremity radiculopathy.  He has previously been in physical therapy before COVID and had to discontinue formal physical therapy but was able to continue doing exercise program on his own at home but continued to have substantial lower back pain and radicular symptoms at this time.      Interval history 09/16/2020:  Since previous encounter the patient has been taking gabapentin 1800 mg per day without any side effects and some improvement in his symptoms, and we will previously scheduled to perform a the epidural injection for his lower extremity radicular pain but was canceled secondary to COVID.  Currently he continues to have bilateral lower extremity radicular pain symptoms worse on the right.  Previous EMG showed mild chronic bilateral lower extremity radicular pains     Interval History 4/7/20:  Follow up for lumbar radicular pain. Patient's scheduled L5-S1 ILESI has been postponed due to covid outbreak.He continues to have lower back pain with bilateral radicular pain to his heels. His gabapentin was increased to 600 mg tid since last visit. He notices slight improvement in pain since increasing gabapentin. He also takes Norco 10 tid prn that is prescribed by his PCP. He is also taking Cymbalta 20 mg qday.      Interval history 03/11/2020:  Since previous encounter the patient is status post right-sided L2-3 and L4-5 transforaminal epidural steroid injections on 02/27/2020 he had about 30% improvement from the previous injection, he has also had some improvement from increasing his gabapentin to 300 mg t.i.d..  He does not have any side effects to this medication.  He continues to have weakness in his right lower extremity continues to have radicular pain symptoms, he  is undergoing physical therapy without any worsening of his symptoms or significant improvement.  He needs paperwork filled out for FMLA for his with job.     Initial encounter:     Jhony Matute presents to the clinic for the evaluation of lower back pain. The pain started several eyars ago following no inciting event and symptoms have been unchanged.     Brief history:     Pain Description:     The pain is located in the lumbosacral area and radiates to the BLE to his heels. For several years he has had numbness of his Right lateral thigh. He endorses weakness in his thighs after prolonged sitting and sometimes feels he needs to prop himself up at his desk.      At BEST  8/10      At WORST  10/10 on the WORST day.       On average pain is rated as 10/10.      Today the pain is rated as 9/10     The pain is described as numbing, sharp and shooting       Symptoms interfere with daily activity, sleeping and work.      Exacerbating factors: Sitting, Bending and Walking.       Mitigating factors medications.      Patient denies night fever/night sweats, urinary incontinence, bowel incontinence and significant weight loss.    Patient denies any suicidal or homicidal ideations     Pain Disability Index Review:  Last 3 PDI Scores 4/18/2022 2/24/2022 11/5/2021   Pain Disability Index (PDI) 63 [phone]     Pain Medications:  Current:  Gabapentin  Flexeril  Norco     Tried in Past:  NSAIDs - nabumetone  TCA -Never  SNRI - Cymbalta  Anti-convulsants -gabapentin  Muscle Relaxants -flexeril  Opioids-norco    Opioid Contract: no     report:  Reviewed and consistent with medication use as prescribed.    Pain Procedures:  3 other ESIs over past 3 years in [place]  11/15/19 R L4-5 and L5-S1 TFESI   2/27/2020 - Right L2-3 and L4-5 transforaminal epidural steroid  10/19/2021- Right L4/5 and L5/S1 TFESI  1/26/2022 - L4/5 ILESI    Physical Therapy/Home Exercise: yes    Imaging:   Imaging:   MRI Lumbar Spine  11/23/2021:  COMPARISON:  Lumbar spine MRI 01/17/2020     FINDINGS:  Vertebral alignment is normal.     No compression fractures.  No marrow replacing lesions.     Multilevel degenerative changes with disc desiccation and disc space narrowing, described in detail below.  No significant bone marrow edema.     The conus medullaris has a normal appearance and terminates at the L1 level.  Cauda equina nerve roots are unremarkable.     Paraspinal soft tissues are unremarkable.     SIGNIFICANT FINDINGS BY LEVEL:     T12-L1: Mild disc bulge, eccentric to the left.  No canal stenosis.  Mild left foraminal stenosis.     L1-2: Disc bulge.  No canal stenosis.  Mild bilateral foraminal stenosis.     L2-3: Disc bulge.  Mild bilateral facet arthropathy.  No canal stenosis.  Mild bilateral foraminal stenosis.     L3-4: Disc bulge.  Mild bilateral facet arthropathy.  No canal stenosis.  Mild-moderate foraminal stenosis bilaterally.     L4-5: Disc bulge.  Mild bilateral facet arthropathy.  No canal stenosis.  Moderate bilateral foraminal stenosis.     L5-S1: Disc bulge.  Bilateral facet arthropathy.  No canal stenosis.  Mild bilateral foraminal stenosis.     Impression:     Progressive multilevel degenerative changes, most advanced at L4-5 where there is moderate bilateral foraminal stenosis.     1/14/20 MRI Lumbar   FINDINGS:  Alignment: Alignment is within normal limits.    Vertebrae: Vertebral body heights are well maintained with no evidence of fracture or marrow replacement process.    Discs: Multilevel disc desiccation with maintained disc heights.    Cord: Distal cord is normal in contour and signal intensity.  Conus terminates at L1.    Degenerative findings:    T12-L1: Circumferential disc bulge with left foraminal zone protrusion resulting in mild left neural foraminal narrowing.    L1-L2: Circumferential disc bulge results in mild right neural foraminal narrowing.    L2-L3: Circumferential disc bulge and mild facet  arthropathy result in mild right neural foraminal narrowing.    L3-L4: Circumferential disc bulge and mild facet arthropathy noted.  No spinal canal stenosis or neural foraminal narrowing.    L4-L5: Circumferential disc bulge and mild facet arthropathy result in mild bilateral neural foraminal narrowing.    L5-S1: Mild facet arthropathy noted.  No spinal canal stenosis or neural foraminal narrowing.    Paraspinal muscles & soft tissues: Unremarkable.    Partially visualized abdominal organs are unremarkable.    Sacroiliac joints are unremarkable.  Small subcentimeter T1/T2 hypointense lesion in the right inferior iliac bone, favoring a bone island.       Impression         Mild multilevel degenerative changes of the lumbar spine resulting in mild neural foraminal narrowing.      9/18/19 EMG BLE:  bilateral L4-S1 radiculopathy.           Allergies: Review of patient's allergies indicates:  No Known Allergies    Current Medications:   Current Outpatient Medications   Medication Sig Dispense Refill    cyclobenzaprine (FLEXERIL) 10 MG tablet       dextroamphetamine-amphetamine (ADDERALL) 15 mg tablet       DULoxetine (CYMBALTA) 20 MG capsule       gabapentin (NEURONTIN) 600 MG tablet Take 2 tablets (1,200 mg total) by mouth 3 (three) times daily. 180 tablet 5    HYDROcodone-acetaminophen (NORCO)  mg per tablet       icosapent ethyL (VASCEPA) 1 gram Cap Take 4 capsules by mouth once daily.      nabumetone (RELAFEN) 500 MG tablet Take 500 mg by mouth 2 (two) times daily.      omega-3 acid ethyl esters (LOVAZA) 1 gram capsule       rosuvastatin (CRESTOR) 10 MG tablet Take 10 mg by mouth once daily.       No current facility-administered medications for this visit.     Facility-Administered Medications Ordered in Other Visits   Medication Dose Route Frequency Provider Last Rate Last Admin    0.9%  NaCl infusion   Intravenous Continuous Eugenio Londono MD 50 mL/hr at 01/26/22 5683 50 mL/hr at 01/26/22 7067        REVIEW OF SYSTEMS:    GENERAL:  No weight loss, malaise or fevers.  HEENT:  Negative for frequent or significant headaches.  NECK:  Negative for lumps, goiter, pain and significant neck swelling.  RESPIRATORY:  Negative for cough, wheezing or shortness of breath.  CARDIOVASCULAR:  Negative for chest pain, leg swelling or palpitations.  GI:  Negative for abdominal discomfort, blood in stools or black stools or change in bowel habits.  MUSCULOSKELETAL:  See HPI.  SKIN:  Negative for lesions, rash, and itching.  PSYCH:  +ve for sleep disturbance, mood disorder and recent psychosocial stressors.  HEMATOLOGY/LYMPHOLOGY:  Negative for prolonged bleeding, bruising easily or swollen nodes.  NEURO:   No history of headaches, syncope, paralysis, seizures or tremors.  All other reviewed and negative other than HPI.    Past Medical History:  Past Medical History:   Diagnosis Date    Hypertension        Past Surgical History:  Past Surgical History:   Procedure Laterality Date    EPIDURAL STEROID INJECTION N/A 1/26/2022    Procedure: INJECTION, STEROID, EPIDURAL  L4/5 IL [place] NEEDS CONSENT;  Surgeon: Tay Nicholson MD;  Location: [facility];  Service: Pain Management;  Laterality: N/A;  1/12 RESCHEDULE    TRANSFORAMINAL EPIDURAL INJECTION OF STEROID Right 11/15/2019    Procedure: LUMBAR TRANSFORAMINAL RIGHT L4/5 AND L5/S1 TF [place];  Surgeon: Michael Montes MD;  Location: [facility];  Service: Pain Management;  Laterality: Right;  NEEDS CONSENT    TRANSFORAMINAL EPIDURAL INJECTION OF STEROID Right 2/27/2020    Procedure: INJECTION, STEROID, EPIDURAL, TRANSFORAMINAL APPROACH, L2-L3 AND L4-L5;  Surgeon: Shon Brunner MD;  Location: [facility];  Service: Pain Management;  Laterality: Right;    TRANSFORAMINAL EPIDURAL INJECTION OF STEROID Right 10/19/2021    Procedure: Injection,steroid,epidural,transforaminal RIGHT L4/5 and L5/S1;  Surgeon: Shon Brunner MD;  Location: [facility];  Service: Pain  "Management;  Laterality: Right;  9/16 will call to r/s house damage  9/15 CB AFTER 9/27       Family History:  Family History   Problem Relation Age of Onset    Stroke Mother     Stroke Father     Heart attack Father        Social History:  Social History     Socioeconomic History    Marital status:    Tobacco Use    Smoking status: Never Smoker    Smokeless tobacco: Never Used   Substance and Sexual Activity    Alcohol use: No     Comment: socially    Drug use: No    Sexual activity: Yes     Partners: Female       OBJECTIVE:    /74   Pulse 69   Temp 98.6 °F (37 °C)   Resp 18   Ht 6' 1" (1.854 m)   Wt 115 kg (253 lb 8.5 oz)   BMI 33.45 kg/m²     PHYSICAL EXAMINATION:    General appearance: Well appearing, in no acute distress, alert and oriented x3.  Psych:  Mood and affect appropriate.  Skin: Skin color, texture, turgor normal, no rashes or lesions, in both upper and lower body.  Head/face:  Atraumatic, normocephalic. No palpable lymph nodes  Cor: RRR  Pulm: Non-labored, symmetric chest rise  Back: Straight leg raising in the sitting position is negative to radicular pain. TTP over lower lumbar spine. No TTP over bilateral SIJ or paraspinal muscles. Normal range of motion, pain with terminal extension. + facet loading bilaterally.  Extremities: Peripheral joint ROM is full and pain free without obvious instability or laxity in all four extremities. No deformities, edema, or skin discoloration. Good capillary refill.  Musculoskeletal: Hip and sacroiliac provocative maneuvers are negative. Bilateral upper and lower extremity strength is normal and symmetric.  No atrophy or tone abnormalities are noted.  Neuro: Bilateral upper and lower extremity coordination and muscle stretch reflexes are physiologic and symmetric.  Plantar response are downgoing.  Patient reports decreased sensation in the right lower extremity, most notably in the lateral thigh.  No clonus.  Gait: Normal.    ASSESSMENT: " 51 y.o. year old male with low back pain, RLE numbness, consistent with     1. Myofascial pain  celecoxib (CELEBREX) 100 MG capsule   2. DDD (degenerative disc disease), lumbar  celecoxib (CELEBREX) 100 MG capsule   3. Urinary incontinence, unspecified type  Ambulatory referral/consult to Urology   4. Other spondylosis, cervical region  celecoxib (CELEBREX) 100 MG capsule   5. Neuropathy  EMG W/ ULTRASOUND AND NERVE CONDUCTION TEST 2 Extremities     PLAN:   - I have stressed the importance of physical activity and a home exercise plan to help with pain and improve health.  - Patient can continue with medications for now since they are providing benefits, using them appropriately, and without side effects.  - Will start a trial of Celebrex 100 mg b.i.d. to see if this helps with pain  - Will refer to urology for further evaluation of incontinent episodes over the past year  - Will order EMG/NCS to evaluate for neuropathy in the lower extremities.  - RTC 4-8 weeks  - Counseled patient regarding the importance of activity modification, constant sleeping habits and physical therapy.    The above plan and management options were discussed at length with patient. Patient is in agreement with the above and verbalized understanding.      Austin Alicia MD  [facility], PGY-3   I have personally reviewed the history and exam of this patient and agree with the resident/fellow/NPs note as stated above.    Tay Nicholson MD  4/18/22

## 2022-06-13 ENCOUNTER — TELEPHONE (OUTPATIENT)
Dept: PAIN MEDICINE | Facility: CLINIC | Age: 52
End: 2022-06-13
Payer: COMMERCIAL

## 2022-06-13 ENCOUNTER — OFFICE VISIT (OUTPATIENT)
Dept: PAIN MEDICINE | Facility: CLINIC | Age: 52
End: 2022-06-13
Payer: COMMERCIAL

## 2022-06-13 ENCOUNTER — PATIENT MESSAGE (OUTPATIENT)
Dept: PAIN MEDICINE | Facility: CLINIC | Age: 52
End: 2022-06-13

## 2022-06-13 DIAGNOSIS — M54.16 LUMBAR RADICULOPATHY: Primary | ICD-10-CM

## 2022-06-13 PROCEDURE — 1160F RVW MEDS BY RX/DR IN RCRD: CPT | Mod: CPTII,95,, | Performed by: NURSE PRACTITIONER

## 2022-06-13 PROCEDURE — 1159F MED LIST DOCD IN RCRD: CPT | Mod: CPTII,95,, | Performed by: NURSE PRACTITIONER

## 2022-06-13 PROCEDURE — 1160F PR REVIEW ALL MEDS BY PRESCRIBER/CLIN PHARMACIST DOCUMENTED: ICD-10-PCS | Mod: CPTII,95,, | Performed by: NURSE PRACTITIONER

## 2022-06-13 PROCEDURE — 1159F PR MEDICATION LIST DOCUMENTED IN MEDICAL RECORD: ICD-10-PCS | Mod: CPTII,95,, | Performed by: NURSE PRACTITIONER

## 2022-06-13 PROCEDURE — 99499 UNLISTED E&M SERVICE: CPT | Mod: 95,,, | Performed by: NURSE PRACTITIONER

## 2022-06-13 PROCEDURE — 99499 NO LOS: ICD-10-PCS | Mod: 95,,, | Performed by: NURSE PRACTITIONER

## 2022-06-13 NOTE — TELEPHONE ENCOUNTER
----- Message from Maria Luz Del Castillo sent at 6/13/2022 12:24 PM CDT -----  Type:  Patient Returning Call    Who Called:pt     Does the patient know what this is regarding?: missed virtual appt   Would the patient rather a call back or a response via [facility]? Call back   Best Call Back Number:[phone]  Additional Information: pt was waiting for virtual appt 06/13 at 11:40am and no one ever came to the phone

## 2022-06-13 NOTE — TELEPHONE ENCOUNTER
Staff returned call to patient in regards to his message.        Staff informed patient it shows he was never check In however staff reschedule his appointment to 3;20p this afternoon with NP        Pt verbalized understanding and accepted the appt

## 2022-06-17 ENCOUNTER — TELEPHONE (OUTPATIENT)
Dept: PAIN MEDICINE | Facility: CLINIC | Age: 52
End: 2022-06-17
Payer: COMMERCIAL

## 2022-06-17 NOTE — TELEPHONE ENCOUNTER
This message is for patient in regards to his/her appointment 06/20/22 at 1:20 p   With Irina Echeverria NP.      [facility] Policy: For the safety of all patients and staff members.     Patient Visitor policy: During this visit we're informing all patients that face mask are now optional, upon visit you have the options of requesting clinical staff to wear a mask for your protection and thiers.     If you have any questions or concerns please contact [phone]      Staff c [facility]

## 2022-06-20 ENCOUNTER — OFFICE VISIT (OUTPATIENT)
Dept: NEUROSURGERY | Facility: CLINIC | Age: 52
End: 2022-06-20
Payer: COMMERCIAL

## 2022-06-20 VITALS — HEART RATE: 73 BPM | DIASTOLIC BLOOD PRESSURE: 80 MMHG | SYSTOLIC BLOOD PRESSURE: 122 MMHG

## 2022-06-20 DIAGNOSIS — M54.16 LUMBAR RADICULOPATHY: ICD-10-CM

## 2022-06-20 DIAGNOSIS — M54.17 LUMBOSACRAL RADICULOPATHY: Primary | ICD-10-CM

## 2022-06-20 PROCEDURE — 3074F PR MOST RECENT SYSTOLIC BLOOD PRESSURE < 130 MM HG: ICD-10-PCS | Mod: CPTII,S$GLB,, | Performed by: STUDENT IN AN ORGANIZED HEALTH CARE EDUCATION/TRAINING PROGRAM

## 2022-06-20 PROCEDURE — 3079F DIAST BP 80-89 MM HG: CPT | Mod: CPTII,S$GLB,, | Performed by: STUDENT IN AN ORGANIZED HEALTH CARE EDUCATION/TRAINING PROGRAM

## 2022-06-20 PROCEDURE — 99999 PR PBB SHADOW E&M-EST. PATIENT-LVL III: CPT | Mod: PBBFAC,,, | Performed by: STUDENT IN AN ORGANIZED HEALTH CARE EDUCATION/TRAINING PROGRAM

## 2022-06-20 PROCEDURE — 99999 PR PBB SHADOW E&M-EST. PATIENT-LVL III: ICD-10-PCS | Mod: PBBFAC,,, | Performed by: STUDENT IN AN ORGANIZED HEALTH CARE EDUCATION/TRAINING PROGRAM

## 2022-06-20 PROCEDURE — 3079F PR MOST RECENT DIASTOLIC BLOOD PRESSURE 80-89 MM HG: ICD-10-PCS | Mod: CPTII,S$GLB,, | Performed by: STUDENT IN AN ORGANIZED HEALTH CARE EDUCATION/TRAINING PROGRAM

## 2022-06-20 PROCEDURE — 99203 OFFICE O/P NEW LOW 30 MIN: CPT | Mod: S$GLB,,, | Performed by: STUDENT IN AN ORGANIZED HEALTH CARE EDUCATION/TRAINING PROGRAM

## 2022-06-20 PROCEDURE — 99203 PR OFFICE/OUTPT VISIT, NEW, LEVL III, 30-44 MIN: ICD-10-PCS | Mod: S$GLB,,, | Performed by: STUDENT IN AN ORGANIZED HEALTH CARE EDUCATION/TRAINING PROGRAM

## 2022-06-20 PROCEDURE — 3074F SYST BP LT 130 MM HG: CPT | Mod: CPTII,S$GLB,, | Performed by: STUDENT IN AN ORGANIZED HEALTH CARE EDUCATION/TRAINING PROGRAM

## 2022-06-20 PROCEDURE — 1159F MED LIST DOCD IN RCRD: CPT | Mod: CPTII,S$GLB,, | Performed by: STUDENT IN AN ORGANIZED HEALTH CARE EDUCATION/TRAINING PROGRAM

## 2022-06-20 PROCEDURE — 1159F PR MEDICATION LIST DOCUMENTED IN MEDICAL RECORD: ICD-10-PCS | Mod: CPTII,S$GLB,, | Performed by: STUDENT IN AN ORGANIZED HEALTH CARE EDUCATION/TRAINING PROGRAM

## 2022-06-20 NOTE — PROGRESS NOTES
Neurosurgery  History & Physical    SUBJECTIVE:     Chief Complaint:  Back pain    History of Present Illness:  51 M presents for eval of long standing back and BLE radicular pain.  His low back pain is worse than his leg pain.  He's had back pain for over 8 years.  Standing/sitting for prolonged periods aggravate the back pain.  It is improved when he lays supine with his legs elevated.  Pt also endorses radicular leg pain which travels down to his ankles and then back up right greater than left.  He has chronic right lateral thigh numbness.  He hasn't done PT in around 3-4 years.  He is established with pain mgmt and had right L4-S1 TFESIs in 10/2021 which gave him about 2 weeks of relief and more recent L4/5 [place] which gave him about one week of relief.    Review of patient's allergies indicates:  No Known Allergies    Current Outpatient Medications   Medication Sig Dispense Refill    celecoxib (CELEBREX) 100 MG capsule TAKE 1 CAPSULE BY MOUTH TWICE DAILY 60 capsule 0    cyclobenzaprine (FLEXERIL) 10 MG tablet       dextroamphetamine-amphetamine (ADDERALL) 15 mg tablet       gabapentin (NEURONTIN) 600 MG tablet Take 2 tablets (1,200 mg total) by mouth 3 (three) times daily. 180 tablet 5    HYDROcodone-acetaminophen (NORCO)  mg per tablet       icosapent ethyL (VASCEPA) 1 gram Cap Take 4 capsules by mouth once daily.      omega-3 acid ethyl esters (LOVAZA) 1 gram capsule       rosuvastatin (CRESTOR) 10 MG tablet Take 10 mg by mouth once daily.       No current facility-administered medications for this visit.     Facility-Administered Medications Ordered in Other Visits   Medication Dose Route Frequency Provider Last Rate Last Admin    0.9%  NaCl infusion   Intravenous Continuous Eugenio Londono MD 50 mL/hr at 01/26/22 1359 50 mL/hr at 01/26/22 1359       Past Medical History:   Diagnosis Date    Hypertension      Past Surgical History:   Procedure Laterality Date    EPIDURAL STEROID INJECTION  N/A 1/26/2022    Procedure: INJECTION, STEROID, EPIDURAL  L4/5 IL [place] NEEDS CONSENT;  Surgeon: Tay Nicholson MD;  Location: [facility];  Service: Pain Management;  Laterality: N/A;  1/12 RESCHEDULE    TRANSFORAMINAL EPIDURAL INJECTION OF STEROID Right 11/15/2019    Procedure: LUMBAR TRANSFORAMINAL RIGHT L4/5 AND L5/S1 TF [place];  Surgeon: Michael Montes MD;  Location: [facility];  Service: Pain Management;  Laterality: Right;  NEEDS CONSENT    TRANSFORAMINAL EPIDURAL INJECTION OF STEROID Right 2/27/2020    Procedure: INJECTION, STEROID, EPIDURAL, TRANSFORAMINAL APPROACH, L2-L3 AND L4-L5;  Surgeon: Shon Brunner MD;  Location: [facility];  Service: Pain Management;  Laterality: Right;    TRANSFORAMINAL EPIDURAL INJECTION OF STEROID Right 10/19/2021    Procedure: Injection,steroid,epidural,transforaminal RIGHT L4/5 and L5/S1;  Surgeon: Shon Brunner MD;  Location: [facility];  Service: Pain Management;  Laterality: Right;  9/16 will call to r/s house damage  9/15 CB AFTER 9/27     Family History     Problem Relation (Age of Onset)    Heart attack Father    Stroke Mother, Father        Social History     Socioeconomic History    Marital status:    Tobacco Use    Smoking status: Never Smoker    Smokeless tobacco: Never Used   Substance and Sexual Activity    Alcohol use: No     Comment: socially    Drug use: No    Sexual activity: Yes     Partners: Female       Review of Systems   14 point ROS was negative    OBJECTIVE:     Vital Signs  Pulse: 73  BP: 122/80  Pain Score:   6  There is no height or weight on file to calculate BMI.      Physical Exam:    Constitutional: He appears well-developed and well-nourished.     Eyes: Pupils are equal, round, and reactive to light.     Cardiovascular: Normal rate and regular rhythm.     Abdominal: Soft.     Psych/Behavior: He is alert. He is oriented to person, place, and time. He has a normal mood and affect.     Musculoskeletal: Gait is  normal.        Neck: Range of motion is full.        Back: Range of motion is limited.        Right Upper Extremities: Muscle strength is 5/5.        Left Upper Extremities: Muscle strength is 5/5.       Right Lower Extremities: Muscle strength is 5/5.        Left Lower Extremities: Muscle strength is 5/5.     Neurological:        Coordination: He has a normal Romberg Test and normal tandem walking coordination.        DTRs: DTRs are DTRS NORMAL AND SYMMETRICnormal and symmetric.        Cranial nerves: Cranial nerve(s) II, III, IV, V, VI, VII, VIII, IX, X, XI and XII are intact.     Diminished sensation in right L2,3 distribution compared to left otherwise intact throughout    +facetogenic pain at lumbosacral jxn right greater than left with extension/rotation      Diagnostic Results:  MRI L spine w/o: moderate bilateral NF stenosis at L4/5.  No significant central canal stenosis throughout.  Mild multilevel facet arthropathy.  Reviewed    ASSESSMENT/PLAN:     51 M with chronic back and BLE radicular pain.  His MRI L spine shows only moderate NF stenosis at L4/5 without any levels of significant central canal stenosis and these findings don't correlate to his current clinical complaints.  -Flex/ex L spine  -Scoliosis  -EMG BLE  -PT  -Fu in 8 weeks  -Pt is already established with pain mgmt.

## 2022-06-23 ENCOUNTER — TELEPHONE (OUTPATIENT)
Dept: NEUROSURGERY | Facility: CLINIC | Age: 52
End: 2022-06-23
Payer: COMMERCIAL

## 2022-06-24 ENCOUNTER — TELEPHONE (OUTPATIENT)
Dept: PAIN MEDICINE | Facility: CLINIC | Age: 52
End: 2022-06-24
Payer: COMMERCIAL

## 2022-06-24 NOTE — TELEPHONE ENCOUNTER
This message is for patient in regards to his/her appointment 06/27/22 at 11:20a   With Irina Echeverria NP.      [facility] Policy: For the safety of all patients and staff members.     Patient Visitor policy: During this visit we're informing all patients that face mask are now optional, upon visit you have the options of requesting clinical staff to wear a mask for your protection and thiers.      If you have any questions or concerns please contact [phone]      Staff [facility]

## 2022-06-27 ENCOUNTER — OFFICE VISIT (OUTPATIENT)
Dept: PAIN MEDICINE | Facility: CLINIC | Age: 52
End: 2022-06-27
Payer: COMMERCIAL

## 2022-06-27 VITALS
RESPIRATION RATE: 18 BRPM | OXYGEN SATURATION: 99 % | TEMPERATURE: 98 F | BODY MASS INDEX: 33.31 KG/M2 | DIASTOLIC BLOOD PRESSURE: 77 MMHG | HEIGHT: 73 IN | HEART RATE: 67 BPM | SYSTOLIC BLOOD PRESSURE: 122 MMHG | WEIGHT: 251.31 LBS

## 2022-06-27 DIAGNOSIS — G89.4 CHRONIC PAIN DISORDER: ICD-10-CM

## 2022-06-27 DIAGNOSIS — M54.16 LUMBAR RADICULOPATHY: ICD-10-CM

## 2022-06-27 DIAGNOSIS — M79.18 MYOFASCIAL PAIN: ICD-10-CM

## 2022-06-27 DIAGNOSIS — M53.3 SACROILIAC JOINT PAIN: ICD-10-CM

## 2022-06-27 DIAGNOSIS — M51.36 DDD (DEGENERATIVE DISC DISEASE), LUMBAR: ICD-10-CM

## 2022-06-27 DIAGNOSIS — M47.816 LUMBAR SPONDYLOSIS: Primary | ICD-10-CM

## 2022-06-27 PROCEDURE — 99999 PR PBB SHADOW E&M-EST. PATIENT-LVL IV: ICD-10-PCS | Mod: PBBFAC,,, | Performed by: NURSE PRACTITIONER

## 2022-06-27 PROCEDURE — 3074F PR MOST RECENT SYSTOLIC BLOOD PRESSURE < 130 MM HG: ICD-10-PCS | Mod: CPTII,S$GLB,, | Performed by: NURSE PRACTITIONER

## 2022-06-27 PROCEDURE — 1159F MED LIST DOCD IN RCRD: CPT | Mod: CPTII,S$GLB,, | Performed by: NURSE PRACTITIONER

## 2022-06-27 PROCEDURE — 99213 OFFICE O/P EST LOW 20 MIN: CPT | Mod: S$GLB,,, | Performed by: NURSE PRACTITIONER

## 2022-06-27 PROCEDURE — 99999 PR PBB SHADOW E&M-EST. PATIENT-LVL IV: CPT | Mod: PBBFAC,,, | Performed by: NURSE PRACTITIONER

## 2022-06-27 PROCEDURE — 3008F PR BODY MASS INDEX (BMI) DOCUMENTED: ICD-10-PCS | Mod: CPTII,S$GLB,, | Performed by: NURSE PRACTITIONER

## 2022-06-27 PROCEDURE — 99213 PR OFFICE/OUTPT VISIT, EST, LEVL III, 20-29 MIN: ICD-10-PCS | Mod: S$GLB,,, | Performed by: NURSE PRACTITIONER

## 2022-06-27 PROCEDURE — 3078F DIAST BP <80 MM HG: CPT | Mod: CPTII,S$GLB,, | Performed by: NURSE PRACTITIONER

## 2022-06-27 PROCEDURE — 1160F RVW MEDS BY RX/DR IN RCRD: CPT | Mod: CPTII,S$GLB,, | Performed by: NURSE PRACTITIONER

## 2022-06-27 PROCEDURE — 3008F BODY MASS INDEX DOCD: CPT | Mod: CPTII,S$GLB,, | Performed by: NURSE PRACTITIONER

## 2022-06-27 PROCEDURE — 3078F PR MOST RECENT DIASTOLIC BLOOD PRESSURE < 80 MM HG: ICD-10-PCS | Mod: CPTII,S$GLB,, | Performed by: NURSE PRACTITIONER

## 2022-06-27 PROCEDURE — 1159F PR MEDICATION LIST DOCUMENTED IN MEDICAL RECORD: ICD-10-PCS | Mod: CPTII,S$GLB,, | Performed by: NURSE PRACTITIONER

## 2022-06-27 PROCEDURE — 1160F PR REVIEW ALL MEDS BY PRESCRIBER/CLIN PHARMACIST DOCUMENTED: ICD-10-PCS | Mod: CPTII,S$GLB,, | Performed by: NURSE PRACTITIONER

## 2022-06-27 PROCEDURE — 3074F SYST BP LT 130 MM HG: CPT | Mod: CPTII,S$GLB,, | Performed by: NURSE PRACTITIONER

## 2022-06-27 RX ORDER — GABAPENTIN 600 MG/1
1200 TABLET ORAL 3 TIMES DAILY
Qty: 180 TABLET | Refills: 5 | Status: SHIPPED | OUTPATIENT
Start: 2022-06-27 | End: 2023-01-24 | Stop reason: SDUPTHER

## 2022-06-27 RX ORDER — CELECOXIB 100 MG/1
100 CAPSULE ORAL 2 TIMES DAILY
Qty: 60 CAPSULE | Refills: 1 | Status: SHIPPED | OUTPATIENT
Start: 2022-06-27 | End: 2023-01-24 | Stop reason: SDUPTHER

## 2022-06-27 NOTE — PROGRESS NOTES
Chronic Pain - Follow Up    Referring Physician: No ref. provider found    Chief Complaint:   Chief Complaint   Patient presents with    Low-back Pain    Leg Pain    Foot Pain     F/u        SUBJECTIVE: Disclaimer: This note has been generated using voice-recognition software. There may be typographical errors that have been missed during proof-reading      Interval History 6/27/2022:  The patient returns to clinic today for follow up of low back pain. He continues to report low back pain that radiates into the radiates into the lateral aspect of his right leg to his knee. He describes this pain as numb in nature. He reports intermittent radiating pain into the left leg in the same pattern. His back pain is greater than leg pain. His pain is worse with standing and walking. He is currently taking Gabapentin and Celebrex with some benefit. He did see Dr. Villarreal in Neurosurgery who recommended PT and EMG. These are scheduled. He denies any other health changes. His pain today is 9/10.    Interval History 4/18/2022:   Patient returns to clinic today for follow-up of chronic low back pain.  He has had multiple [place] with little improvement.  In addition to his back pain, he reports numbness in the lateral aspect of his right thigh with decreased sensation throughout his right lower extremity.  He is currently taking Norco, gabapentin, and Flexeril with no reported side effects.  He also reports episodes of bladder incontinence over the past 1-2 years.  He denies any saddle anesthesia or bowel incontinence.     Interval History 2/24/2022:  The patient returns to clinic today for follow up of low back pain. He is s/p L4/5 IL [place]A on 1/26/2022. He reports no relief, in fact his pain worsened after the procedure. He continues to report low back pain that radiates into the lateral aspect of both legs to his heels. He describes this pain as shocking in nature. His pain is worse with prolonged standing and walking. He is  frustrated with his continued pain. He continues to take Gabapentin, Cymbalta, and Nabumetone. He has completed physical therapy in the past without relief. He denies any other health changes. His pain today is 10/10.    Interval History 11/5/2021:  The patient returns to clinic today for follow up of low back pain. He is s/p right L4/5 and L5/S1 TF [place] on 10/19/2021. He reports limited relief of his pain. He continues to report low back pain that radiates into the lateral aspect of his right leg to under his foot. He does report numbness to the bottom of both feet. His pain is worse with prolonged standing and walking. He also reports difficulty sleeping due to pain. He does feel as though his right leg will give out. His wife reports that he is depressed. He reports episodes of urinary incontinence without the urge to go. He has not seen Urology. He does have an upcoming appointment with his PCP. He denies any other health changes. His pain today is 8/10.    Interval History 6/22/21:  Recently treated for acute cystitis, symptoms resolved.  Continues to use cymbalta 30mg / day gabapentin 600mg TID.  Continues to have substantial lower back pain and right lower extremity radiculopathy.  He has previously been in physical therapy before [facility] and had to discontinue formal physical therapy but was able to continue doing exercise program on his own at home but continued to have substantial lower back pain and radicular symptoms at this time.      Interval history 09/16/2020:  Since previous encounter the patient has been taking gabapentin 1800 mg per day without any side effects and some improvement in his symptoms, and we will previously scheduled to perform a the epidural injection for his lower extremity radicular pain but was canceled secondary to COVID.  Currently he continues to have bilateral lower extremity radicular pain symptoms worse on the right.  Previous EMG showed mild chronic bilateral lower extremity  radicular pains     Interval History 4/7/20:  Follow up for lumbar radicular pain. Patient's scheduled L5-S1 ILESI has been postponed due to covid outbreak.He continues to have lower back pain with bilateral radicular pain to his heels. His gabapentin was increased to 600 mg tid since last visit. He notices slight improvement in pain since increasing gabapentin. He also takes Norco 10 tid prn that is prescribed by his PCP. He is also taking Cymbalta 20 mg qday.     Interval history 03/11/2020:  Since previous encounter the patient is status post right-sided L2-3 and L4-5 transforaminal epidural steroid injections on 02/27/2020 he had about 30% improvement from the previous injection, he has also had some improvement from increasing his gabapentin to 300 mg t.i.d..  He does not have any side effects to this medication.  He continues to have weakness in his right lower extremity continues to have radicular pain symptoms, he is undergoing physical therapy without any worsening of his symptoms or significant improvement.  He needs paperwork filled out for FMLA for his with job.    Initial encounter:    Jhony Matute presents to the clinic for the evaluation of lower back pain. The pain started several eyars ago following no inciting event and symptoms have been unchanged.    Brief history:    Pain Description:    The pain is located in the lumbosacral area and radiates to the BLE to his heels. For several years he has had numbness of his Right lateral thigh. He endorses weakness in his thighs after prolonged sitting and sometimes feels he needs to prop himself up at his desk.     At BEST  8/10     At WORST  10/10 on the WORST day.      On average pain is rated as 10/10.     Today the pain is rated as 9/10    The pain is described as numbing, sharp and shooting      Symptoms interfere with daily activity, sleeping and work.     Exacerbating factors: Sitting, Bending and Walking.      Mitigating factors medications.  "    Patient denies night fever/night sweats, urinary incontinence, bowel incontinence and significant weight loss.  Patient denies any suicidal or homicidal ideations    Pain Medications:  Current:  Gabapentin  Flexeril  Nabumetone  Cymbalta  Norco    Tried in Past:  NSAIDs -nabumetone  TCA -Never  SNRI -Never  Anti-convulsants -gabapentin  Muscle Relaxants -flexeril  Opioids-norco    Physical Therapy/Home Exercise: yes (in 2015 or 2016) "did not help much"     report:  Reviewed and consistent with medication use as prescribed.    Pain Procedures:   -11/15/19 R L4-5 and L5-S1 TFESI  - 3 other ESIs over past 3 years in [place]  02/27/2020-right L2-3 and L4-5 transforaminal epidural steroid  10/19/2021- Right L4/5 and L5/S1 TF [place]  1/26/2022- L4/5 IL [place]A    Chiropractor -never  Acupuncture - never  TENS unit -never  Spinal decompression -never  Joint replacement -never    Imaging:   MRI Lumbar Spine 11/23/2021:    COMPARISON:  Lumbar spine MRI 01/17/2020     FINDINGS:  Vertebral alignment is normal.     No compression fractures.  No marrow replacing lesions.     Multilevel degenerative changes with disc desiccation and disc space narrowing, described in detail below.  No significant bone marrow edema.     The conus medullaris has a normal appearance and terminates at the L1 level.  Cauda equina nerve roots are unremarkable.     Paraspinal soft tissues are unremarkable.     SIGNIFICANT FINDINGS BY LEVEL:     T12-L1: Mild disc bulge, eccentric to the left.  No canal stenosis.  Mild left foraminal stenosis.     L1-2: Disc bulge.  No canal stenosis.  Mild bilateral foraminal stenosis.     L2-3: Disc bulge.  Mild bilateral facet arthropathy.  No canal stenosis.  Mild bilateral foraminal stenosis.     L3-4: Disc bulge.  Mild bilateral facet arthropathy.  No canal stenosis.  Mild-moderate foraminal stenosis bilaterally.     L4-5: Disc bulge.  Mild bilateral facet arthropathy.  No canal stenosis.  Moderate bilateral " foraminal stenosis.     L5-S1: Disc bulge.  Bilateral facet arthropathy.  No canal stenosis.  Mild bilateral foraminal stenosis.     Impression:     Progressive multilevel degenerative changes, most advanced at L4-5 where there is moderate bilateral foraminal stenosis.      Past Medical History:   Diagnosis Date    Hypertension      Past Surgical History:   Procedure Laterality Date    EPIDURAL STEROID INJECTION N/A 1/26/2022    Procedure: INJECTION, STEROID, EPIDURAL  L4/5 IL [place] NEEDS CONSENT;  Surgeon: Tay Nicholson MD;  [place] [facility], [place], [place] by [facility];  Service: Pain Management;  Laterality: N/A;  1/12 RESCHEDULE    TRANSFORAMINAL EPIDURAL INJECTION OF STEROID Right 11/15/2019    Procedure: LUMBAR TRANSFORAMINAL RIGHT L4/5 AND L5/S1 TF [place];  Surgeon: Michael Montes MD;  Location: [facility], [place], operated by [facility];  Service: Pain Management;  Laterality: Right;  NEEDS CONSENT    TRANSFORAMINAL EPIDURAL INJECTION OF STEROID Right 2/27/2020    Procedure: INJECTION, STEROID, EPIDURAL, TRANSFORAMINAL APPROACH, L2-L3 AND L4-L5;  Surgeon: Shon Brunner MD;  Location: [facility], [place], operated by [facility];  Service: Pain Management;  Laterality: Right;    TRANSFORAMINAL EPIDURAL INJECTION OF STEROID Right 10/19/2021    Procedure: Injection,steroid,epidural,transforaminal RIGHT L4/5 and L5/S1;  Surgeon: Shon Brunner MD;  Location: [facility], [place], operated by [facility];  Service: Pain Management;  Laterality: Right;  9/16 will call to r/s house damage  9/15 CB AFTER 9/27     Social History     Socioeconomic History    Marital status:    Tobacco Use    Smoking status: Never Smoker    Smokeless tobacco: Never Used   Substance and Sexual Activity    Alcohol use: No     Comment: socially    Drug use: No    Sexual activity: Yes     Partners: Female     Family History   Problem Relation Age of Onset    Stroke Mother     Stroke Father     Heart attack Father        Review of patient's allergies indicates:  No Known Allergies    Current Outpatient Medications  "  Medication Sig    celecoxib (CELEBREX) 100 MG capsule TAKE 1 CAPSULE BY MOUTH TWICE DAILY    cyclobenzaprine (FLEXERIL) 10 MG tablet     dextroamphetamine-amphetamine (ADDERALL) 15 mg tablet     gabapentin (NEURONTIN) 600 MG tablet Take 2 tablets (1,200 mg total) by mouth 3 (three) times daily.    HYDROcodone-acetaminophen (NORCO)  mg per tablet     icosapent ethyL (VASCEPA) 1 gram Cap Take 4 capsules by mouth once daily.    omega-3 acid ethyl esters (LOVAZA) 1 gram capsule     rosuvastatin (CRESTOR) 10 MG tablet Take 10 mg by mouth once daily.     No current facility-administered medications for this visit.     Facility-Administered Medications Ordered in Other Visits   Medication    0.9%  NaCl infusion       REVIEW OF SYSTEMS:    GENERAL:  No weight loss, malaise or fevers.  HEENT:   No recent changes in vision or hearing  NECK:  Negative for lumps, no difficulty with swallowing.  RESPIRATORY:  Negative for cough, wheezing or shortness of breath, patient denies any recent URI.  CARDIOVASCULAR:  Negative for chest pain, leg swelling or palpitations. HTN  GI:  Negative for abdominal discomfort, blood in stools or black stools or change in bowel habits.  MUSCULOSKELETAL:  See HPI.  SKIN:  Negative for lesions, rash, and itching.  PSYCH:  No mood disorder or recent psychosocial stressors.  Sleep disorder  HEMATOLOGY/LYMPHOLOGY:  Negative for prolonged bleeding, bruising easily or swollen nodes.  Patient is not currently taking any anti-coagulants  ENDO: No history of diabetes or thyroid dysfunction. gout  NEURO:   No history of headaches, syncope, paralysis, seizures or tremors.  All other reviewed and negative other than HPI.    OBJECTIVE:    /77 (BP Location: Right arm, Patient Position: Sitting, BP Method: X-Large (Automatic))   Pulse 67   Temp 97.6 °F (36.4 °C)   Resp 18   Ht 6' 1" (1.854 m)   Wt 114 kg (251 lb 5.2 oz)   SpO2 99%   BMI 33.16 kg/m²       PHYSICAL " EXAMINATION:    GENERAL: Well appearing, in no acute distress, alert and oriented x3.  PSYCH:  Mood and affect appropriate.  SKIN: Skin color, texture, turgor normal, no rashes or lesions.  HEAD/FACE:  Normocephalic, atraumatic. Cranial nerves grossly intact.  CV: RRR with palpation of the radial artery.  PULM: No evidence of respiratory difficulty, symmetric chest rise.  BACK: Straight leg raise in the sitting position is negative for radicular pain bilaterally. There is pain with palpation over lumbar facet joints bilaterally. Limited ROM with pain on flexion and extension. Positive facet loading bilaterally.   EXTREMITIES: No deformities, edema, or skin discoloration. Good capillary refill.  MUSCULOSKELETAL:  There is mild pain with palpation over the sacroiliac joints bilaterally. Unable to perform FABERs. 4/5 strength in right ankle with plantar and dorsiflexion. 5/5 strength in left ankle with plantar and dorsiflexion. 5/5 strength with right knee flexion and extension. 5/5 strength with left knee flexion and extension. No atrophy or tone abnormalities are noted.  NEURO: Bilateral lower extremity coordination and muscle stretch reflexes are physiologic and symmetric.  Plantar response are downgoing. No clonus.   GAIT: Antalgic- ambulates without assistance.       ASSESSMENT: 51 y.o. year old male with pain, consistent with     Encounter Diagnoses   Name Primary?    Lumbar spondylosis Yes    Lumbar radiculopathy     DDD (degenerative disc disease), lumbar     Myofascial pain     Sacroiliac joint pain     Chronic pain disorder        PLAN:     - Previous imaging was reviewed and discussed with the patient today.    - Schedule for bilateral L3,4,5 MBB.    - If significant relief, we will repeat x1 then proceed with RFA.     - He is scheduled for EMG, encouraged to keep that appointment.     - Continue Celebrex and Gabapentin, refills provided.     - I have stressed the importance of physical activity and a  home exercise plan to help with pain and improve health.    - RTC PRN, call with diary results.     - Dr. Nicholson was consulted on the patient and agrees with this plan.    The above plan and management options were discussed at length with patient. Patient is in agreement with the above and verbalized understanding.     Irina Echeverria  06/27/2022

## 2022-06-28 ENCOUNTER — TELEPHONE (OUTPATIENT)
Dept: ADMINISTRATIVE | Facility: OTHER | Age: 52
End: 2022-06-28
Payer: COMMERCIAL

## 2022-07-01 ENCOUNTER — CLINICAL SUPPORT (OUTPATIENT)
Dept: REHABILITATION | Facility: HOSPITAL | Age: 52
End: 2022-07-01
Payer: MEDICAID

## 2022-07-01 DIAGNOSIS — M62.89 MUSCLE HYPERTONICITY: ICD-10-CM

## 2022-07-01 DIAGNOSIS — M53.86 DECREASED ROM OF LUMBAR SPINE: ICD-10-CM

## 2022-07-01 DIAGNOSIS — M54.17 LUMBOSACRAL RADICULOPATHY: ICD-10-CM

## 2022-07-01 PROCEDURE — 97161 PT EVAL LOW COMPLEX 20 MIN: CPT | Mod: PN
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
## 2022-07-05 NOTE — PROGRESS NOTES
"  Physical Therapy Daily Treatment Note     Name: Jhony Matute  Clinic Number: [id number]    Therapy Diagnosis:   Encounter Diagnoses   Name Primary?    Decreased ROM of lumbar spine Yes    Muscle hypertonicity      Physician: Lyle Villarreal DO    Visit Date: 7/8/2022    Physician Orders: PT Eval and Treat   Medical Diagnosis from Referral: M54.17 (ICD-10-CM) - Lumbosacral radiculopathy  Evaluation Date: 7/1/2022  Authorization Period Expiration: 6/20/2023  Plan of Care Expiration: 10/1/2022  Visit # / Visits authorized: 2/20     PN DUE: 8/1/2022      Time In: 10:45  Time Out: 11:30  Total Billable Time: 45 minutes    Precautions: Standard    Subjective     Pt reports: Pt reports that his right low back pain has been constant and stays at a 8-9/10. He reports that on Saturday over the weekend, he had increased pain and spent most of his day lying flat on the floor with both of his legs elevated to try and relieve some pain.      He was not compliant with home exercise program. He states he performed the exercises once since his last visit. He was educated on the importance of performing these stretches/exercises at home to help with his overall recovery.   Response to previous treatment: He states that he felt okay following his last treatment.   Functional change: Still having increased bilateral low back pain    Pain: 8/10  Location: right back that radiates down his lower extremity     Objective   Objective Measures updated at progress report unless specified    TREATMENT  Jhony Matute received the treatments listed below:    (exercises and techniques performed today are bolded)    Jhony received therapeutic exercises to develop strength, ROM and flexibility for 35 minutes including:  + bike with seat at 12 for 5 minutes  +Calf stretch 3 x 30"  Supine hamstring stretch 2 x 30"  +Piriformis stretch 2 x 30"   single knee to chest 2 x 30"   Shot gun 5 x 10"   open books x 5      Jhony received the following " manual therapy techniques:   Myofacial release and Soft tissue Mobilization were applied to the: lumbar spine for 10 minutes, including:  side lying quadratus lumborum manual stretch/ traction  myofascial release of bilateral  quadratus lumborum, piriformis, erector spinae   +STM to Right quadratus lumborum, piriformis, erector spinae    Home Exercises Provided and Patient Education Provided     Education provided:   - Pt was educated on importance of performing his HEP at home to help with his overall recovery  - Pt was educated on his upcoming [place] procedure and what to expect regarding symptoms and recovery  - Pt was educated on importance of taking breaks while at work as to not sit for long periods of time    Written Home Exercises Provided: Patient instructed to cont prior HEP.  Exercises were reviewed and Jhony was able to demonstrate them prior to the end of the session.  Jhony demonstrated good  understanding of the education provided. See EMR under Patient Instructions for exercises provided during therapy sessions.    Assessment   Patient tolerated therapy session well today. He required verbal cueing on all added exercises today for proper form and technique. He continues to his severe pain in right low back that radiates into his lower extremity. He exhibits tightness in bilateral hamstrings and piriformis musculature. He has hypertonicity of his right erector spinae, quadratus lumborum, and piriformis. During Soft tissue massage, pt has most tenderness over his right PSIS and piriformis. He felt relief of pain with side-lying distraction. Following therapy session, he reports decrease in his pain and feeling less stiff. He will continue to benefit from skilled outpatient physical therapy to decrease his right low back pain and increase his overall posture and stability.     Jhony Is progressing well towards his goals.   Pt prognosis is Good.     Pt will continue to benefit from skilled outpatient physical  therapy to address the deficits listed in the problem list box on initial evaluation, provide pt/family education and to maximize pt's level of independence in the home and community environment.     Pt's spiritual, cultural and educational needs considered and pt agreeable to plan of care and goals.     Anticipated barriers to physical therapy:  pain    Goals:   Short Term Goals:  4 weeks   1. Pain: Pt will demonstrate improved pain by reports of less than or equal to 7/10 worst pain on the verbal rating scale in order to progress toward maximal functional ability and improve QOL.   2.  Mobility: Patient will improve AROM by 25% in all directions in order to return to maximal functional potential and improve quality of life.   3.  HEP: Patient will demonstrate independence with HEP in order to progress toward functional independence.      Long Term Goals:  8 weeks   1. Pain: Pt will demonstrate improved pain by reports of less than or equal to 5/10 worst pain on the verbal rating scale in order to progress toward maximal functional ability and improve QOL.     2. Function: Patient will demonstrate improved function as indicated by a functional limitation score of 43% on the FOTO.   3. Mobility: Patient will improve AROM of lumbar spine to with in normal limits in order to return to maximal functional potential and improve quality of life.   4. Strength: Patient will improve strength to 5/5 in bilateral lower extremities in order to improve functional independence and quality of life.   5. Patient will return to normal ADL's, community involvement, and recreational activities with no pain and maximal function.       Goals Key:  PC= progressing/continue;        PM= partially met;             DC= discontinue      Plan   Plan of care Certification: 7/1/2022 to 10/1/2022.     Cont PT per POC and progress pt as tolerated and appropriate.      Milena Fine, PT       Therapist was supervised by licensed Physical Therapist  who agrees with documentation above  Carissa Berman, PT, DPT, PPCES

## 2022-07-08 ENCOUNTER — CLINICAL SUPPORT (OUTPATIENT)
Dept: REHABILITATION | Facility: HOSPITAL | Age: 52
End: 2022-07-08
Payer: MEDICAID

## 2022-07-08 DIAGNOSIS — M62.89 MUSCLE HYPERTONICITY: ICD-10-CM

## 2022-07-08 DIAGNOSIS — M53.86 DECREASED ROM OF LUMBAR SPINE: Primary | ICD-10-CM

## 2022-07-08 PROCEDURE — 97110 THERAPEUTIC EXERCISES: CPT | Mod: PN

## 2022-07-08 PROCEDURE — 97140 MANUAL THERAPY 1/> REGIONS: CPT | Mod: PN

## 2022-07-19 ENCOUNTER — PATIENT MESSAGE (OUTPATIENT)
Dept: PAIN MEDICINE | Facility: OTHER | Age: 52
End: 2022-07-19
Payer: MEDICAID

## 2022-07-20 ENCOUNTER — TELEPHONE (OUTPATIENT)
Dept: ADMINISTRATIVE | Facility: OTHER | Age: 52
End: 2022-07-20
Payer: MEDICAID

## 2022-07-26 ENCOUNTER — DOCUMENTATION ONLY (OUTPATIENT)
Dept: REHABILITATION | Facility: HOSPITAL | Age: 52
End: 2022-07-26
Payer: MEDICAID

## 2022-07-26 NOTE — PROGRESS NOTES
Patient was called pertaining to having not attended therapy in a few weeks. Patient reports he has been in so much pain he has been unable to come into therapy and had to reschedule his injection. Patient would like to cancel all remaining appointments and see how he feels after his injection next week. Patient will call to schedule if he would like to return to therapy.     Carissa Berman, PT, DPT, PPCES  07/26/2022

## 2022-08-22 ENCOUNTER — PATIENT MESSAGE (OUTPATIENT)
Dept: ORTHOPEDICS | Facility: CLINIC | Age: 52
End: 2022-08-22
Payer: MEDICAID

## 2022-08-23 ENCOUNTER — PATIENT MESSAGE (OUTPATIENT)
Dept: PAIN MEDICINE | Facility: OTHER | Age: 52
End: 2022-08-23
Payer: MEDICAID

## 2022-08-31 ENCOUNTER — DOCUMENTATION ONLY (OUTPATIENT)
Dept: REHABILITATION | Facility: HOSPITAL | Age: 52
End: 2022-08-31
Payer: MEDICAID

## 2022-08-31 NOTE — PROGRESS NOTES
[facility]   Discharge Note    Name: Jhony Matute  Clinic Number: [id number]    Therapy Diagnosis: decreased lumbar range of motion ; muscle hypertonicity   Physician: Lyle Villarreal DO     Physician Orders: PT Eval and Treat   Medical Diagnosis from Referral: M54.17 (ICD-10-CM) - Lumbosacral radiculopathy  Evaluation Date: 7/1/2022    Date of Last visit: 7/1/2022  Total Visits Received: 1    ASSESSMENT        Discharge reason: Patient has not attended therapy since 7/1/2022    Goals:   Short Term Goals:  4 weeks   Pain: Pt will demonstrate improved pain by reports of less than or equal to 7/10 worst pain on the verbal rating scale in order to progress toward maximal functional ability and improve QOL.   2.  Mobility: Patient will improve AROM by 25% in all directions in order to return to maximal functional potential and improve quality of life.   3.  HEP: Patient will demonstrate independence with HEP in order to progress toward functional independence.      Long Term Goals:  8 weeks   Pain: Pt will demonstrate improved pain by reports of less than or equal to 5/10 worst pain on the verbal rating scale in order to progress toward maximal functional ability and improve QOL.     Function: Patient will demonstrate improved function as indicated by a functional limitation score of 43% on the FOTO.   Mobility: Patient will improve AROM of lumbar spine to with in normal limits in order to return to maximal functional potential and improve quality of life.   Strength: Patient will improve strength to 5/5 in bilateral lower extremities in order to improve functional independence and quality of life.   Patient will return to normal ADL's, community involvement, and recreational activities with no pain and maximal function.       No goals met secondary to not attending any follow up visit.     PLAN   This patient is discharged from Physical Therapy      Carissa Berman, PT, DPT,  PPCES  08/31/2022

## 2022-10-17 ENCOUNTER — PATIENT MESSAGE (OUTPATIENT)
Dept: PAIN MEDICINE | Facility: OTHER | Age: 52
End: 2022-10-17
Payer: MEDICAID

## 2022-11-03 NOTE — TELEPHONE ENCOUNTER
----- Message from Wendy Kinney MA sent at 6/1/2020  1:32 PM CDT -----  Contact: RENÉ RED [[id number]]  Please contact and schedule.      PLAN:      - I have stressed the importance of physical activity and a home exercise plan to help with pain and improve health.     - Patient can continue with medications for now since they are providing benefits, using them appropriately, and without side effects.     - Consider medrol dosepack if pain worsens prior to follow up - a prescription was sent to his pharmacy     - Plan to s/f  L5-S1 ILESI when procedures resume                                          Wendy      ----- Message -----  From: Lyndsey Keys, Patient Care Assistant  Sent: 6/1/2020  12:13 PM CDT  To: Myesha Menendez Staff    Name of Who is Calling: RENÉ RED [[id number]]    What is the request in detail:Requesting a call back to schedule procedure.Please contact to further discuss and advise      Can the clinic reply by [facility]: No    What Number to Call Back if not in [facility]:   [phone] or [phone]          DISPLAY PLAN FREE TEXT

## 2023-01-05 ENCOUNTER — TELEPHONE (OUTPATIENT)
Dept: PAIN MEDICINE | Facility: CLINIC | Age: 53
End: 2023-01-05
Payer: MEDICAID

## 2023-01-05 NOTE — TELEPHONE ENCOUNTER
Staff offered appt on 1/23/23 2 9:40 am with Irina Echeverria Np. Pt accepted and verbalized understanding.

## 2023-01-05 NOTE — TELEPHONE ENCOUNTER
Patient informed staff to disregard this message. Someone already contact and setup patient a visit.    Verbalized understanding

## 2023-01-05 NOTE — TELEPHONE ENCOUNTER
----- Message from Saige Caballero sent at 1/5/2023 11:10 AM CST -----  Regarding: Sooner appt  Contact: RENÉ RED [[id number]]  Type:  Sooner Apoointment Request    Caller is requesting a sooner appointment.  Caller declined first available appointment listed below.  Caller will not accept being placed on the waitlist and is requesting a message be sent to doctor.  [facility]    When is the first available appointment?5/5/2023  Symptoms:Lower back pain, he wants an injection   Would the patient rather a call back or a response via [facility]? Call back   Best Call Back Number:[phone]   Additional Information:

## 2023-01-05 NOTE — TELEPHONE ENCOUNTER
----- Message from Saige Caballero sent at 1/5/2023 11:10 AM CST -----  Regarding: Sooner appt  Contact: RENÉ RED [[id number]]  Type:  Sooner Apoointment Request    Caller is requesting a sooner appointment.  Caller declined first available appointment listed below.  Caller will not accept being placed on the waitlist and is requesting a message be sent to doctor.  [facility]    When is the first available appointment?5/5/2023  Symptoms:Lower back pain, he wants an injection   Would the patient rather a call back or a response via [facility]? Call back   Best Call Back Number:[phone]   Additional Information:

## 2023-01-23 ENCOUNTER — TELEPHONE (OUTPATIENT)
Dept: PAIN MEDICINE | Facility: CLINIC | Age: 53
End: 2023-01-23
Payer: MEDICAID

## 2023-01-23 NOTE — TELEPHONE ENCOUNTER
This message is for patient in regards to his/her appointment 01/24/23 at 9:40a   With Irina Echeverria NP.      For the safety of all patients and staff members. We are requesting during this visit that all patients and visitors to wear required face mask at all times here at [facility].     If you have any questions or concerns please contact [phone]     Staff left pt a voicemail reminding of their appt

## 2023-01-24 ENCOUNTER — PATIENT MESSAGE (OUTPATIENT)
Dept: PAIN MEDICINE | Facility: OTHER | Age: 53
End: 2023-01-24
Payer: MEDICAID

## 2023-01-24 ENCOUNTER — OFFICE VISIT (OUTPATIENT)
Dept: PAIN MEDICINE | Facility: CLINIC | Age: 53
End: 2023-01-24
Payer: MEDICAID

## 2023-01-24 VITALS
BODY MASS INDEX: 33.75 KG/M2 | HEART RATE: 59 BPM | WEIGHT: 254.63 LBS | SYSTOLIC BLOOD PRESSURE: 139 MMHG | DIASTOLIC BLOOD PRESSURE: 88 MMHG | HEIGHT: 73 IN

## 2023-01-24 DIAGNOSIS — M54.9 DORSALGIA, UNSPECIFIED: ICD-10-CM

## 2023-01-24 DIAGNOSIS — M54.16 LUMBAR RADICULOPATHY: Primary | ICD-10-CM

## 2023-01-24 DIAGNOSIS — M47.26 OSTEOARTHRITIS OF SPINE WITH RADICULOPATHY, LUMBAR REGION: ICD-10-CM

## 2023-01-24 DIAGNOSIS — M79.18 MYOFASCIAL PAIN: ICD-10-CM

## 2023-01-24 DIAGNOSIS — M51.36 DDD (DEGENERATIVE DISC DISEASE), LUMBAR: ICD-10-CM

## 2023-01-24 DIAGNOSIS — M53.3 SACROILIAC JOINT PAIN: ICD-10-CM

## 2023-01-24 PROCEDURE — 99999 PR PBB SHADOW E&M-EST. PATIENT-LVL III: ICD-10-PCS | Mod: PBBFAC,,, | Performed by: NURSE PRACTITIONER

## 2023-01-24 PROCEDURE — 3008F BODY MASS INDEX DOCD: CPT | Mod: CPTII,,, | Performed by: NURSE PRACTITIONER

## 2023-01-24 PROCEDURE — 3075F SYST BP GE 130 - 139MM HG: CPT | Mod: CPTII,,, | Performed by: NURSE PRACTITIONER

## 2023-01-24 PROCEDURE — 1160F PR REVIEW ALL MEDS BY PRESCRIBER/CLIN PHARMACIST DOCUMENTED: ICD-10-PCS | Mod: CPTII,,, | Performed by: NURSE PRACTITIONER

## 2023-01-24 PROCEDURE — 1159F PR MEDICATION LIST DOCUMENTED IN MEDICAL RECORD: ICD-10-PCS | Mod: CPTII,,, | Performed by: NURSE PRACTITIONER

## 2023-01-24 PROCEDURE — 1160F RVW MEDS BY RX/DR IN RCRD: CPT | Mod: CPTII,,, | Performed by: NURSE PRACTITIONER

## 2023-01-24 PROCEDURE — 99213 OFFICE O/P EST LOW 20 MIN: CPT | Mod: S$PBB,,, | Performed by: NURSE PRACTITIONER

## 2023-01-24 PROCEDURE — 99999 PR PBB SHADOW E&M-EST. PATIENT-LVL III: CPT | Mod: PBBFAC,,, | Performed by: NURSE PRACTITIONER

## 2023-01-24 PROCEDURE — 3075F PR MOST RECENT SYSTOLIC BLOOD PRESS GE 130-139MM HG: ICD-10-PCS | Mod: CPTII,,, | Performed by: NURSE PRACTITIONER

## 2023-01-24 PROCEDURE — 1159F MED LIST DOCD IN RCRD: CPT | Mod: CPTII,,, | Performed by: NURSE PRACTITIONER

## 2023-01-24 PROCEDURE — 99213 OFFICE O/P EST LOW 20 MIN: CPT | Mod: PBBFAC | Performed by: NURSE PRACTITIONER

## 2023-01-24 PROCEDURE — 3079F DIAST BP 80-89 MM HG: CPT | Mod: CPTII,,, | Performed by: NURSE PRACTITIONER

## 2023-01-24 PROCEDURE — 99213 PR OFFICE/OUTPT VISIT, EST, LEVL III, 20-29 MIN: ICD-10-PCS | Mod: S$PBB,,, | Performed by: NURSE PRACTITIONER

## 2023-01-24 PROCEDURE — 3079F PR MOST RECENT DIASTOLIC BLOOD PRESSURE 80-89 MM HG: ICD-10-PCS | Mod: CPTII,,, | Performed by: NURSE PRACTITIONER

## 2023-01-24 PROCEDURE — 3008F PR BODY MASS INDEX (BMI) DOCUMENTED: ICD-10-PCS | Mod: CPTII,,, | Performed by: NURSE PRACTITIONER

## 2023-01-24 RX ORDER — CELECOXIB 100 MG/1
100 CAPSULE ORAL 2 TIMES DAILY
Qty: 60 CAPSULE | Refills: 1 | Status: SHIPPED | OUTPATIENT
Start: 2023-01-24 | End: 2023-03-08 | Stop reason: SDUPTHER

## 2023-01-24 RX ORDER — GABAPENTIN 600 MG/1
1200 TABLET ORAL 3 TIMES DAILY
Qty: 180 TABLET | Refills: 5 | Status: SHIPPED | OUTPATIENT
Start: 2023-01-24 | End: 2023-03-08 | Stop reason: SDUPTHER

## 2023-01-24 NOTE — PROGRESS NOTES
Chronic Pain - Follow Up    Referring Physician: No ref. provider found    Chief Complaint:   Chief Complaint   Patient presents with    Low-back Pain        SUBJECTIVE: Disclaimer: This note has been generated using voice-recognition software. There may be typographical errors that have been missed during proof-reading    Interval History 1/24/2023:  The patient returns to clinic today for follow up of low back pain. He has not been seen in over 6 months. He was previously scheduled for MBB but this was not done. He continues to report low back pain with intermittent radiating pain into the lateral aspect of both legs to the his heels. He describes this pain as throbbing in nature. His back pain is greater than his leg pain. His pain is worse with bending, twisting, standing, and walking. He does endorse morning stiffness. He tried physical therapy with limited relief. He has had limited relief with ESIs. He is currently taking Gabapentin and Celebrex with benefit. Of note, his wife mentions that he is having episodes of urinary incontinence. He does not feel the urge to go. He denies any weakness. He denies any other health changes. His pain today is 10/10.    Interval History 6/27/2022:  The patient returns to clinic today for follow up of low back pain. He continues to report low back pain that radiates into the radiates into the lateral aspect of his right leg to his knee. He describes this pain as numb in nature. He reports intermittent radiating pain into the left leg in the same pattern. His back pain is greater than leg pain. His pain is worse with standing and walking. He is currently taking Gabapentin and Celebrex with some benefit. He did see Dr. Villarreal in Neurosurgery who recommended PT and EMG. These are scheduled. He denies any other health changes. His pain today is 9/10.    Interval History 4/18/2022:   Patient returns to clinic today for follow-up of chronic low back pain.  He has had multiple  [place] with little improvement.  In addition to his back pain, he reports numbness in the lateral aspect of his right thigh with decreased sensation throughout his right lower extremity.  He is currently taking Norco, gabapentin, and Flexeril with no reported side effects.  He also reports episodes of bladder incontinence over the past 1-2 years.  He denies any saddle anesthesia or bowel incontinence.     Interval History 2/24/2022:  The patient returns to clinic today for follow up of low back pain. He is s/p L4/5 IL [place] on 1/26/2022. He reports no relief, in fact his pain worsened after the procedure. He continues to report low back pain that radiates into the lateral aspect of both legs to his heels. He describes this pain as shocking in nature. His pain is worse with prolonged standing and walking. He is frustrated with his continued pain. He continues to take Gabapentin, Cymbalta, and Nabumetone. He has completed physical therapy in the past without relief. He denies any other health changes. His pain today is 10/10.    Interval History 11/5/2021:  The patient returns to clinic today for follow up of low back pain. He is s/p right L4/5 and L5/S1 TF [place] on 10/19/2021. He reports limited relief of his pain. He continues to report low back pain that radiates into the lateral aspect of his right leg to under his foot. He does report numbness to the bottom of both feet. His pain is worse with prolonged standing and walking. He also reports difficulty sleeping due to pain. He does feel as though his right leg will give out. His wife reports that he is depressed. He reports episodes of urinary incontinence without the urge to go. He has not seen Urology. He does have an upcoming appointment with his PCP. He denies any other health changes. His pain today is 8/10.    Interval History 6/22/21:  Recently treated for acute cystitis, symptoms resolved.  Continues to use cymbalta 30mg / day gabapentin 600mg TID.  Continues to  have substantial lower back pain and right lower extremity radiculopathy.  He has previously been in physical therapy before COVID and had to discontinue formal physical therapy but was able to continue doing exercise program on his own at home but continued to have substantial lower back pain and radicular symptoms at this time.      Interval history 09/16/2020:  Since previous encounter the patient has been taking gabapentin 1800 mg per day without any side effects and some improvement in his symptoms, and we will previously scheduled to perform a the epidural injection for his lower extremity radicular pain but was canceled secondary to COVID.  Currently he continues to have bilateral lower extremity radicular pain symptoms worse on the right.  Previous EMG showed mild chronic bilateral lower extremity radicular pains     Interval History 4/7/20:  Follow up for lumbar radicular pain. Patient's scheduled L5-S1 ILESI has been postponed due to covid outbreak.He continues to have lower back pain with bilateral radicular pain to his heels. His gabapentin was increased to 600 mg tid since last visit. He notices slight improvement in pain since increasing gabapentin. He also takes Norco 10 tid prn that is prescribed by his PCP. He is also taking Cymbalta 20 mg qday.     Interval history 03/11/2020:  Since previous encounter the patient is status post right-sided L2-3 and L4-5 transforaminal epidural steroid injections on 02/27/2020 he had about 30% improvement from the previous injection, he has also had some improvement from increasing his gabapentin to 300 mg t.i.d..  He does not have any side effects to this medication.  He continues to have weakness in his right lower extremity continues to have radicular pain symptoms, he is undergoing physical therapy without any worsening of his symptoms or significant improvement.  He needs paperwork filled out for [place] for his with job.    Initial encounter:    Jhony Matute  "presents to the clinic for the evaluation of lower back pain. The pain started several eyars ago following no inciting event and symptoms have been unchanged.    Brief history:    Pain Description:    The pain is located in the lumbosacral area and radiates to the BLE to his heels. For several years he has had numbness of his Right lateral thigh. He endorses weakness in his thighs after prolonged sitting and sometimes feels he needs to prop himself up at his desk.     At BEST  8/10     At WORST  10/10 on the WORST day.      On average pain is rated as 10/10.     Today the pain is rated as 9/10    The pain is described as numbing, sharp and shooting      Symptoms interfere with daily activity, sleeping and work.     Exacerbating factors: Sitting, Bending and Walking.      Mitigating factors medications.     Patient denies night fever/night sweats, urinary incontinence, bowel incontinence and significant weight loss.  Patient denies any suicidal or homicidal ideations    Pain Medications:  Current:  Gabapentin  Flexeril  Nabumetone  Cymbalta  Norco    Tried in Past:  NSAIDs -nabumetone  TCA -Never  SNRI -Never  Anti-convulsants -gabapentin  Muscle Relaxants -flexeril  Opioids-norco    Physical Therapy/Home Exercise: yes (in 2015 or 2016) "did not help much"     report:  Reviewed and consistent with medication use as prescribed.    Pain Procedures:   -11/15/19 R L4-5 and L5-S1 TFESI  - 3 other ESIs over past 3 years in [place]  02/27/2020-right L2-3 and L4-5 transforaminal epidural steroid  10/19/2021- Right L4/5 and L5/S1 TF [place]  1/26/2022- L4/5 IL [place]    Chiropractor -never  Acupuncture - never  TENS unit -never  Spinal decompression -never  Joint replacement -never    Imaging:   MRI Lumbar Spine 11/23/2021:    COMPARISON:  Lumbar spine MRI 01/17/2020     FINDINGS:  Vertebral alignment is normal.     No compression fractures.  No marrow replacing lesions.     Multilevel degenerative changes with disc " desiccation and disc space narrowing, described in detail below.  No significant bone marrow edema.     The conus medullaris has a normal appearance and terminates at the L1 level.  Cauda equina nerve roots are unremarkable.     Paraspinal soft tissues are unremarkable.     SIGNIFICANT FINDINGS BY LEVEL:     T12-L1: Mild disc bulge, eccentric to the left.  No canal stenosis.  Mild left foraminal stenosis.     L1-2: Disc bulge.  No canal stenosis.  Mild bilateral foraminal stenosis.     L2-3: Disc bulge.  Mild bilateral facet arthropathy.  No canal stenosis.  Mild bilateral foraminal stenosis.     L3-4: Disc bulge.  Mild bilateral facet arthropathy.  No canal stenosis.  Mild-moderate foraminal stenosis bilaterally.     L4-5: Disc bulge.  Mild bilateral facet arthropathy.  No canal stenosis.  Moderate bilateral foraminal stenosis.     L5-S1: Disc bulge.  Bilateral facet arthropathy.  No canal stenosis.  Mild bilateral foraminal stenosis.     Impression:     Progressive multilevel degenerative changes, most advanced at L4-5 where there is moderate bilateral foraminal stenosis.      Past Medical History:   Diagnosis Date    Hypertension      Past Surgical History:   Procedure Laterality Date    EPIDURAL STEROID INJECTION N/A 1/26/2022    Procedure: INJECTION, STEROID, EPIDURAL  L4/5 IL [place] NEEDS CONSENT;  Surgeon: Tay Nicholson MD;  Location: [facility] PAIN MGT;  Service: Pain Management;  Laterality: N/A;  1/12 RESCHEDULE    TRANSFORAMINAL EPIDURAL INJECTION OF STEROID Right 11/15/2019    Procedure: LUMBAR TRANSFORAMINAL RIGHT L4/5 AND L5/S1 TF [place];  Surgeon: Michael Montes MD;  Location: [facility] PAIN MGT;  Service: Pain Management;  Laterality: Right;  NEEDS CONSENT    TRANSFORAMINAL EPIDURAL INJECTION OF STEROID Right 2/27/2020    Procedure: INJECTION, STEROID, EPIDURAL, TRANSFORAMINAL APPROACH, L2-L3 AND L4-L5;  Surgeon: Shon Brunner MD;  Location: [facility] PAIN MGT;  Service: Pain Management;  Laterality: Right;     TRANSFORAMINAL EPIDURAL INJECTION OF STEROID Right 10/19/2021    Procedure: Injection,steroid,epidural,transforaminal RIGHT L4/5 and L5/S1;  Surgeon: Shon Brunner MD;  Location: [facility] PAIN T;  Service: Pain Management;  Laterality: Right;  9/16 will call to r/s house damage  9/15 CB AFTER 9/27     Social History     Socioeconomic History    Marital status:    Tobacco Use    Smoking status: Never    Smokeless tobacco: Never   Substance and Sexual Activity    Alcohol use: No     Comment: socially    Drug use: No    Sexual activity: Yes     Partners: Female     Family History   Problem Relation Age of Onset    Stroke Mother     Stroke Father     Heart attack Father        Review of patient's allergies indicates:  No Known Allergies    Current Outpatient Medications   Medication Sig    celecoxib (CELEBREX) 100 MG capsule Take 1 capsule (100 mg total) by mouth 2 (two) times daily.    cyclobenzaprine (FLEXERIL) 10 MG tablet     dextroamphetamine-amphetamine (ADDERALL) 15 mg tablet     gabapentin (NEURONTIN) 600 MG tablet Take 2 tablets (1,200 mg total) by mouth 3 (three) times daily.    HYDROcodone-acetaminophen (NORCO)  mg per tablet     icosapent ethyL (VASCEPA) 1 gram Cap Take 4 capsules by mouth once daily.    omega-3 acid ethyl esters (LOVAZA) 1 gram capsule     rosuvastatin (CRESTOR) 10 MG tablet Take 10 mg by mouth once daily.     No current facility-administered medications for this visit.     Facility-Administered Medications Ordered in Other Visits   Medication    0.9%  NaCl infusion       REVIEW OF SYSTEMS:    GENERAL:  No weight loss, malaise or fevers.  HEENT:   No recent changes in vision or hearing  NECK:  Negative for lumps, no difficulty with swallowing.  RESPIRATORY:  Negative for cough, wheezing or shortness of breath, patient denies any recent URI.  CARDIOVASCULAR:  Negative for chest pain, leg swelling or palpitations. HTN  GI:  Negative for abdominal discomfort, blood in stools  "or black stools or change in bowel habits.  MUSCULOSKELETAL:  See HPI.  SKIN:  Negative for lesions, rash, and itching.  PSYCH:  No mood disorder or recent psychosocial stressors.  Sleep disorder  HEMATOLOGY/LYMPHOLOGY:  Negative for prolonged bleeding, bruising easily or swollen nodes.  Patient is not currently taking any anti-coagulants  ENDO: No history of diabetes or thyroid dysfunction. gout  NEURO:   No history of headaches, syncope, paralysis, seizures or tremors.  All other reviewed and negative other than HPI.    OBJECTIVE:    /88 (BP Location: Left arm, Patient Position: Sitting, BP Method: Large (Automatic))   Pulse (!) 59   Ht 6' 1" (1.854 m)   Wt 115.5 kg (254 lb 10.1 oz)   BMI 33.59 kg/m²       PHYSICAL EXAMINATION:    GENERAL: Well appearing, in no acute distress, alert and oriented x3.  PSYCH:  Mood and affect appropriate.  SKIN: Skin color, texture, turgor normal, no rashes or lesions.  HEAD/FACE:  Normocephalic, atraumatic. Cranial nerves grossly intact.  CV: RRR with palpation of the radial artery.  PULM: No evidence of respiratory difficulty, symmetric chest rise.  BACK: Straight leg raise in the sitting position is positive for radicular pain bilaterally. There is pain with palpation over lumbar facet joints bilaterally. Limited ROM with pain on flexion and extension. Positive facet loading bilaterally.   EXTREMITIES: No deformities, edema, or skin discoloration. Good capillary refill.  MUSCULOSKELETAL:  There is pain with palpation over the sacroiliac joints bilaterally. FABERs is positive bilaterally 4/5 strength in right ankle with plantar and dorsiflexion. 4/5 strength in left ankle with plantar and dorsiflexion. 5/5 strength with right knee flexion and extension. 5/5 strength with left knee flexion and extension. No atrophy or tone abnormalities are noted.  NEURO: Bilateral lower extremity coordination and muscle stretch reflexes are physiologic and symmetric.  Plantar response " are downgoing. No clonus.   GAIT: Antalgic- ambulates without assistance.       ASSESSMENT: 52 y.o. year old male with pain, consistent with     Encounter Diagnoses   Name Primary?    Lumbar radiculopathy Yes    Osteoarthritis of spine with radiculopathy, lumbar region     DDD (degenerative disc disease), lumbar     Myofascial pain     Sacroiliac joint pain     Dorsalgia, unspecified          PLAN:     - Previous imaging was reviewed and discussed with the patient today.    - Obtain updated lumbar MRI, given incontinence.     - Schedule for bilateral L3,4,5 MBB.    - If significant relief, we will repeat x1 then proceed with RFA.     - Continue Celebrex and Gabapentin, refills provided.     - I have stressed the importance of physical activity and a home exercise plan to help with pain and improve health.    - RTC 1 week after block. I will call him with MRI results.     The above plan and management options were discussed at length with patient. Patient is in agreement with the above and verbalized understanding.     Irina Echeverria  01/24/2023

## 2023-02-15 ENCOUNTER — HOSPITAL ENCOUNTER (OUTPATIENT)
Dept: RADIOLOGY | Facility: HOSPITAL | Age: 53
Discharge: HOME | End: 2023-02-15
Attending: NURSE PRACTITIONER
Payer: MEDICAID

## 2023-02-15 DIAGNOSIS — M54.9 DORSALGIA, UNSPECIFIED: ICD-10-CM

## 2023-02-15 PROCEDURE — 72148 MRI LUMBAR SPINE WITHOUT CONTRAST: ICD-10-PCS | Mod: 26,,, | Performed by: RADIOLOGY

## 2023-02-15 PROCEDURE — 72148 MRI LUMBAR SPINE W/O DYE: CPT | Mod: TC

## 2023-02-15 PROCEDURE — 72148 MRI LUMBAR SPINE W/O DYE: CPT | Mod: 26,,, | Performed by: RADIOLOGY

## 2023-02-17 ENCOUNTER — TELEPHONE (OUTPATIENT)
Dept: PAIN MEDICINE | Facility: CLINIC | Age: 53
End: 2023-02-17
Payer: MEDICAID

## 2023-03-01 ENCOUNTER — HOSPITAL ENCOUNTER (OUTPATIENT)
Facility: OTHER | Age: 53
Discharge: HOME | End: 2023-03-01
Attending: ANESTHESIOLOGY | Admitting: ANESTHESIOLOGY
Payer: MEDICAID

## 2023-03-01 VITALS
WEIGHT: 250 LBS | BODY MASS INDEX: 33.13 KG/M2 | SYSTOLIC BLOOD PRESSURE: 163 MMHG | HEIGHT: 73 IN | RESPIRATION RATE: 16 BRPM | TEMPERATURE: 98 F | DIASTOLIC BLOOD PRESSURE: 98 MMHG | HEART RATE: 68 BPM | OXYGEN SATURATION: 98 %

## 2023-03-01 DIAGNOSIS — G89.29 CHRONIC PAIN: ICD-10-CM

## 2023-03-01 DIAGNOSIS — M51.36 DDD (DEGENERATIVE DISC DISEASE), LUMBAR: Primary | ICD-10-CM

## 2023-03-01 PROCEDURE — 64494 PR INJ DX/THER AGNT PARAVERT FACET JOINT,IMG GUIDE,LUMBAR/SAC, 2ND LEVEL: ICD-10-PCS | Mod: 50,KX,, | Performed by: ANESTHESIOLOGY

## 2023-03-01 PROCEDURE — 25000003 PHARM REV CODE 250: Performed by: ANESTHESIOLOGY

## 2023-03-01 PROCEDURE — 64493 INJ PARAVERT F JNT L/S 1 LEV: CPT | Mod: 50,KX,, | Performed by: ANESTHESIOLOGY

## 2023-03-01 PROCEDURE — 64493 INJ PARAVERT F JNT L/S 1 LEV: CPT | Mod: 50,KX | Performed by: ANESTHESIOLOGY

## 2023-03-01 PROCEDURE — 64494 INJ PARAVERT F JNT L/S 2 LEV: CPT | Mod: 50,KX,, | Performed by: ANESTHESIOLOGY

## 2023-03-01 PROCEDURE — 64494 INJ PARAVERT F JNT L/S 2 LEV: CPT | Mod: 50,KX | Performed by: ANESTHESIOLOGY

## 2023-03-01 PROCEDURE — 64493 PR INJ DX/THER AGNT PARAVERT FACET JOINT,IMG GUIDE,LUMBAR/SAC,1ST LVL: ICD-10-PCS | Mod: 50,KX,, | Performed by: ANESTHESIOLOGY

## 2023-03-01 PROCEDURE — 63600175 PHARM REV CODE 636 W HCPCS: Performed by: ANESTHESIOLOGY

## 2023-03-01 RX ORDER — BUPIVACAINE HYDROCHLORIDE 2.5 MG/ML
INJECTION, SOLUTION EPIDURAL; INFILTRATION; INTRACAUDAL
Status: DISCONTINUED | OUTPATIENT
Start: 2023-03-01 | End: 2023-03-01 | Stop reason: HOSPADM

## 2023-03-01 RX ORDER — LIDOCAINE HYDROCHLORIDE 20 MG/ML
INJECTION, SOLUTION INFILTRATION; PERINEURAL
Status: DISCONTINUED | OUTPATIENT
Start: 2023-03-01 | End: 2023-03-01 | Stop reason: HOSPADM

## 2023-03-01 RX ORDER — MIDAZOLAM HYDROCHLORIDE 1 MG/ML
INJECTION INTRAMUSCULAR; INTRAVENOUS
Status: DISCONTINUED | OUTPATIENT
Start: 2023-03-01 | End: 2023-03-01 | Stop reason: HOSPADM

## 2023-03-01 RX ORDER — SODIUM CHLORIDE 9 MG/ML
500 INJECTION, SOLUTION INTRAVENOUS CONTINUOUS
Status: DISCONTINUED | OUTPATIENT
Start: 2023-03-01 | End: 2023-03-01 | Stop reason: HOSPADM

## 2023-03-01 NOTE — OP NOTE
Diagnostic Lumbar Medial Branch Block Under Fluoroscopy    The procedure, risks, benefits, and options were discussed with the patient. There are no contraindications to the procedure. The patent expressed understanding and agreed to the procedure. Informed written consent was obtained prior to the start of the procedure and can be found in the patient's chart.    PATIENT NAME: Jhony Matute   MRN: [id number]     DATE OF PROCEDURE: 03/01/2023                                           PROCEDURE:  Diagnostic Bilateral L3, L4, and L5 Lumbar Medial Branch Block under Fluoroscopy    PRE-OP DIAGNOSIS: Spondylosis of lumbar region without myelopathy or radiculopathy [M47.816] Lumbar spondylosis [M47.816]    POST-OP DIAGNOSIS: Same    PHYSICIAN: Tay Nicholson MD    ASSISTANTS: Rosa Gilliam DO    MEDICATIONS INJECTED:  Bupivicaine 0.25%    LOCAL ANESTHETIC INJECTED:   Xylocaine 2%    SEDATION: None    ESTIMATED BLOOD LOSS:  None    COMPLICATIONS:  None.    INTERVAL HISTORY: Patient has clinical and imaging findings suggestive of facet mediated pain.    TECHNIQUE: Time-out was performed to identify the patient and procedure to be performed. With the patient laying in a prone position, the surgical area was prepped and draped in the usual sterile fashion using ChloraPrep and fenestrated drape. The levels were determined under fluoroscopic guidance. Skin anesthesia was achieved by injecting Lidocaine 2% over the injection sites. A 22 gauge, 3.5 inch needle was introduced into the medial branch nerves at the junctions of the superior articular process and the transverse processes of the targeted sites using AP, lateral and/or contralateral oblique fluoroscopic imaging. After negative aspiration for blood or CSF was confirmed, 1 mL of the anesthetic listed above was then slowly injected at each site. The needles were removed and bleeding was nil. A sterile dressing was applied. No specimens collected. The patient tolerated  the procedure well.     The patient was monitored after the procedure in the recovery area. They were given post-procedure and discharge instructions to follow at home. The patient was discharged in a stable condition.    Armando Longo MD [facility] Fellow    I reviewed and edited the fellow's note. I conducted my own interview and physical examination. I agree with the findings. I was present and supervising all critical portions of the procedure.    Tay Nicholson MD

## 2023-03-01 NOTE — DISCHARGE INSTRUCTIONS
Lumbar/Cervical/Joint Medial Branch Block Pain Diary    Patient Name:  :    Pre-procedure Pain Score: _____/10    Time of injection:     Please fill out the chart below to the best of your ability. We need to know how much percentage of relief in your pain that you have while the numbing medication is active. Please be mindful that this is a diagnostic test and may not last for a long time. If you are asleep during one of the times listed below, you do not have to record that time.     Time After the   Procedure % of pain relief   achieved Pain Score (0-10)   1 hour post-procedure     2 hours post-procedure     3 hours post-procedure     4 hours post-procedure     5 hours post-procedure     6 hours post-procedure     12 hours post-procedure     24 hours post-procedure       Please make sure to return this form or information to the clinic. This information is needed to proceed with your plan of care. There are several options that you can use to send this back to us.    Form can be faxed to us at [phone]  Call us to provide the information at [phone]  Send the information to us through your [facility] Chart    If you have any questions about completing this diary, please call us at [phone].        Thank you for allowing us to care for you today. You may receive a survey about the care we provided. Your feedback is valuable and helps us provide excellent care throughout the community.     Home Care Instructions for Pain Management:    1. DIET:   You may resume your normal diet today.   2. BATHING:   You may shower with luke warm water. No tub baths or anything that will soak injection sites under water for the next 24 hours.  3. DRESSING:   You may remove your bandage today.   4. ACTIVITY LEVEL:   You may resume your normal activities 24 hrs after your procedure. Nothing strenuous today.  5. MEDICATIONS:   You may resume your normal medications today. To restart blood thinners, ask your  doctor.  6. DRIVING    If you have received any sedatives by mouth today, you may not drive for 12 hours.    If you have received any sedation through your IV, you may not drive for 24 hrs.   7. SPECIAL INSTRUCTIONS:   No heat to the injection site for 24 hrs including, hot bath or shower, heating pad, moist heat, or hot tubs.    Use ice pack to injection site for any pain or discomfort.  Apply ice packs for 20 minute intervals as needed.    IF you have diabetes, be sure to monitor your blood sugar more closely. IF your injection contained steroids your blood sugar levels may become higher than normal.    If you are still having pain upon discharge:  Your pain may improve over the next 48 hours. The anesthetic (numbing medication) works immediately to 48 hours. IF your injection contained a steroid (anti-inflammatory medication), it takes approximately 3 days to start feeling relief and 7-10 days to see your greatest results from the medication. It is possible you may need subsequent injections. This would be discussed at your follow up appointment with pain management or your referring doctor.    Please call the PAIN MANAGEMENT office at [phone] or ON CALL pager at [phone] if you experienced any:   -Weakness or loss of sensation  -Fever > 101.5  -Pain uncontrolled with oral medications   -Persistent nausea, vomiting, or diarrhea  -Redness or drainage from the injection sites, or any other worrisome concerns.   If physician on call was not reached or could not communicate with our office for any reason please go to the nearest emergency department.

## 2023-03-01 NOTE — DISCHARGE SUMMARY
Discharge Note  Short Stay      SUMMARY     Admit Date: 3/1/2023    Attending Physician: Tay Nicholson    Discharge Physician: Armando Longo      Discharge Date: 3/1/2023 2:46 PM    Procedure(s) (LRB):  BLOCK, NERVE BILATERAL L3,L4,L5 MEDIAL BRANCH (Bilateral)    Final Diagnosis: Spondylosis of lumbar region without myelopathy or radiculopathy [M47.816]    Disposition: Home or self care    Patient Instructions:   Current Discharge Medication List        CONTINUE these medications which have NOT CHANGED    Details   celecoxib (CELEBREX) 100 MG capsule Take 1 capsule (100 mg total) by mouth 2 (two) times daily.  Qty: 60 capsule, Refills: 1    Associated Diagnoses: DDD (degenerative disc disease), lumbar; Myofascial pain      cyclobenzaprine (FLEXERIL) 10 MG tablet       dextroamphetamine-amphetamine (ADDERALL) 15 mg tablet       gabapentin (NEURONTIN) 600 MG tablet Take 2 tablets (1,200 mg total) by mouth 3 (three) times daily.  Qty: 180 tablet, Refills: 5    Associated Diagnoses: DDD (degenerative disc disease), lumbar      HYDROcodone-acetaminophen (NORCO)  mg per tablet       icosapent ethyL (VASCEPA) 1 gram Cap Take 4 capsules by mouth once daily.      omega-3 acid ethyl esters (LOVAZA) 1 gram capsule       rosuvastatin (CRESTOR) 10 MG tablet Take 10 mg by mouth once daily.                 Discharge Diagnosis: Spondylosis of lumbar region without myelopathy or radiculopathy [M47.816]  Condition on Discharge: Stable with no complications to procedure   Diet on Discharge: Same as before.  Activity: as per instruction sheet.  Discharge to: Home with a responsible adult.  Follow up: 2-4 weeks       Please call my office or pager at [phone] if experienced any weakness or loss of sensation, fever > 101.5, pain uncontrolled with oral medications, persistent nausea/vomiting/or diarrhea, redness or drainage from the incisions, or any other worrisome concerns. If physician on call was not reached or could not  communicate with our office for any reason please go to the nearest emergency department        Tay Nicholson

## 2023-03-01 NOTE — H&P
HPI  Jhony Matute 53 yo male  Presenting for L3, 4, 5 medial branch block, bilateral        Past Medical History:   Diagnosis Date    Hypertension      Past Surgical History:   Procedure Laterality Date    EPIDURAL STEROID INJECTION N/A 1/26/2022    Procedure: INJECTION, STEROID, EPIDURAL  L4/5 IL [place] NEEDS CONSENT;  Surgeon: Tay Nicholson MD;  Location: [facility];  Service: Pain Management;  Laterality: N/A;  1/12 RESCHEDULE    TRANSFORAMINAL EPIDURAL INJECTION OF STEROID Right 11/15/2019    Procedure: LUMBAR TRANSFORAMINAL RIGHT L4/5 AND L5/S1 TF [place];  Surgeon: Michael Montes MD;  Location: [facility];  Service: Pain Management;  Laterality: Right;  NEEDS CONSENT    TRANSFORAMINAL EPIDURAL INJECTION OF STEROID Right 2/27/2020    Procedure: INJECTION, STEROID, EPIDURAL, TRANSFORAMINAL APPROACH, L2-L3 AND L4-L5;  Surgeon: Shon Brunner MD;  Location: [facility];  Service: Pain Management;  Laterality: Right;    TRANSFORAMINAL EPIDURAL INJECTION OF STEROID Right 10/19/2021    Procedure: Injection,steroid,epidural,transforaminal RIGHT L4/5 and L5/S1;  Surgeon: Shon Brunner MD;  Location: [facility];  Service: Pain Management;  Laterality: Right;  9/16 will call to r/s house [facility]  9/15 CB AFTER 9/27     Review of patient's allergies indicates:  No Known Allergies     PMHx, PSHx, Allergies, Medications reviewed in epic      ROS negative except pain complaints in HPI    OBJECTIVE:    There were no vitals taken for this visit.    PHYSICAL EXAMINATION:    GENERAL: Well appearing, in no acute distress, alert and oriented x3.  PSYCH:  Mood and affect appropriate.  SKIN: Skin color, texture, turgor normal, no rashes or lesions.  CV: RRR with palpation of the radial artery.  PULM: No evidence of respiratory difficulty, symmetric chest rise. Clear to auscultation.  NEURO: Cranial nerves grossly intact.    Plan:    Proceed with procedure as planned    Rosa Gilliam  03/01/2023

## 2023-03-02 ENCOUNTER — TELEPHONE (OUTPATIENT)
Dept: PAIN MEDICINE | Facility: CLINIC | Age: 53
End: 2023-03-02
Payer: MEDICAID

## 2023-03-02 NOTE — TELEPHONE ENCOUNTER
----- Message from Cassie Guthrie sent at 3/2/2023  4:33 PM CST -----  Regarding: PAIN DIARY  Hour 1  - 70% pain relief / 4 score           Hour 2   - 70% pain relief / 4 score           Hour 3   - 70% pain relief / 4 score           Hour 4 - 70% pain relief / 4 score         Hour 5   - 70% pain relief / 4 score           Hour 6   - 80% pain relief / 3 score           Hour 8 -  80% pain relief / 3 score           Bedtime- 80% pain relief / 3 score           Next Morning- 80% pain relief / 3 score

## 2023-03-08 ENCOUNTER — TELEPHONE (OUTPATIENT)
Dept: ADMINISTRATIVE | Facility: OTHER | Age: 53
End: 2023-03-08
Payer: MEDICAID

## 2023-03-08 ENCOUNTER — OFFICE VISIT (OUTPATIENT)
Dept: PAIN MEDICINE | Facility: CLINIC | Age: 53
End: 2023-03-08
Payer: MEDICAID

## 2023-03-08 DIAGNOSIS — M79.18 MYOFASCIAL PAIN: ICD-10-CM

## 2023-03-08 DIAGNOSIS — M47.816 SPONDYLOSIS OF LUMBAR REGION WITHOUT MYELOPATHY OR RADICULOPATHY: Primary | ICD-10-CM

## 2023-03-08 DIAGNOSIS — M51.36 DDD (DEGENERATIVE DISC DISEASE), LUMBAR: ICD-10-CM

## 2023-03-08 PROCEDURE — 99213 PR OFFICE/OUTPT VISIT, EST, LEVL III, 20-29 MIN: ICD-10-PCS | Mod: 95,,, | Performed by: NURSE PRACTITIONER

## 2023-03-08 PROCEDURE — 99213 OFFICE O/P EST LOW 20 MIN: CPT | Mod: 95,,, | Performed by: NURSE PRACTITIONER

## 2023-03-08 PROCEDURE — 1159F PR MEDICATION LIST DOCUMENTED IN MEDICAL RECORD: ICD-10-PCS | Mod: CPTII,95,, | Performed by: NURSE PRACTITIONER

## 2023-03-08 PROCEDURE — 1160F PR REVIEW ALL MEDS BY PRESCRIBER/CLIN PHARMACIST DOCUMENTED: ICD-10-PCS | Mod: CPTII,95,, | Performed by: NURSE PRACTITIONER

## 2023-03-08 PROCEDURE — 1159F MED LIST DOCD IN RCRD: CPT | Mod: CPTII,95,, | Performed by: NURSE PRACTITIONER

## 2023-03-08 PROCEDURE — 1160F RVW MEDS BY RX/DR IN RCRD: CPT | Mod: CPTII,95,, | Performed by: NURSE PRACTITIONER

## 2023-03-08 RX ORDER — GABAPENTIN 600 MG/1
1200 TABLET ORAL 3 TIMES DAILY
Qty: 180 TABLET | Refills: 5 | Status: SHIPPED | OUTPATIENT
Start: 2023-03-08 | End: 2023-10-17 | Stop reason: SDUPTHER

## 2023-03-08 RX ORDER — CELECOXIB 100 MG/1
100 CAPSULE ORAL 2 TIMES DAILY
Qty: 60 CAPSULE | Refills: 1 | Status: SHIPPED | OUTPATIENT
Start: 2023-03-08 | End: 2023-10-17 | Stop reason: SDUPTHER

## 2023-03-08 RX ORDER — CYCLOBENZAPRINE HCL 10 MG
10 TABLET ORAL 3 TIMES DAILY PRN
Qty: 90 TABLET | Refills: 0 | Status: SHIPPED | OUTPATIENT
Start: 2023-03-08 | End: 2023-10-17 | Stop reason: SDUPTHER

## 2023-03-08 NOTE — PROGRESS NOTES
Chronic Pain - Follow Up  Chronic Pain-Tele-Medicine-Established Note (Follow up visit)        The patient location is: Home  The chief complaint leading to consultation is: pain  Visit type: Virtual visit with synchronous audio and video  Total time spent with patient: 25 min  Each patient to whom he or she provides medical services by telemedicine is:  (1) informed of the relationship between the physician and patient and the respective role of any other health care provider with respect to management of the patient; and (2) notified that he or she may decline to receive medical services by telemedicine and may withdraw from such care at any time.      Referring Physician: No ref. provider found    Chief Complaint:   Chief Complaint   Patient presents with    Low-back Pain        SUBJECTIVE: Disclaimer: This note has been generated using voice-recognition software. There may be typographical errors that have been missed during proof-reading    Interval History 3/8/2023:  The patient returns to clinic today for follow up of low back pain via virtual visit. He is s/p bilateral L3,4,5 MBB on 3/1/2023. He reports 80% relief of his low back pain for 3 days. His pain has returned to baseline. He continues to report low back pain, throbbing in nature. His pain is worse with bending, twisting, standing and walking. He does endorse morning stiffness. He continues to take Celebrex, Flexeril, and Gabapentin. He denies any other health changes.     Interval History 1/24/2023:  The patient returns to clinic today for follow up of low back pain. He has not been seen in over 6 months. He was previously scheduled for MBB but this was not done. He continues to report low back pain with intermittent radiating pain into the lateral aspect of both legs to the his heels. He describes this pain as throbbing in nature. His back pain is greater than his leg pain. His pain is worse with bending, twisting, standing, and walking. He does  endorse morning stiffness. He tried physical therapy with limited relief. He has had limited relief with ESIs. He is currently taking Gabapentin and Celebrex with benefit. Of note, his wife mentions that he is having episodes of urinary incontinence. He does not feel the urge to go. He denies any weakness. He denies any other health changes. His pain today is 10/10.    Interval History 6/27/2022:  The patient returns to clinic today for follow up of low back pain. He continues to report low back pain that radiates into the radiates into the lateral aspect of his right leg to his knee. He describes this pain as numb in nature. He reports intermittent radiating pain into the left leg in the same pattern. His back pain is greater than leg pain. His pain is worse with standing and walking. He is currently taking Gabapentin and Celebrex with some benefit. He did see Dr. Villarreal in Neurosurgery who recommended PT and EMG. These are scheduled. He denies any other health changes. His pain today is 9/10.    Interval History 4/18/2022:   Patient returns to clinic today for follow-up of chronic low back pain.  He has had multiple [place] with little improvement.  In addition to his back pain, he reports numbness in the lateral aspect of his right thigh with decreased sensation throughout his right lower extremity.  He is currently taking Norco, gabapentin, and Flexeril with no reported side effects.  He also reports episodes of bladder incontinence over the past 1-2 years.  He denies any saddle anesthesia or bowel incontinence.     Interval History 2/24/2022:  The patient returns to clinic today for follow up of low back pain. He is s/p L4/5 IL [place] on 1/26/2022. He reports no relief, in fact his pain worsened after the procedure. He continues to report low back pain that radiates into the lateral aspect of both legs to his heels. He describes this pain as shocking in nature. His pain is worse with prolonged standing and walking. He  is frustrated with his continued pain. He continues to take Gabapentin, Cymbalta, and Nabumetone. He has completed physical therapy in the past without relief. He denies any other health changes. His pain today is 10/10.    Interval History 11/5/2021:  The patient returns to clinic today for follow up of low back pain. He is s/p right L4/5 and L5/S1 TF [place] on 10/19/2021. He reports limited relief of his pain. He continues to report low back pain that radiates into the lateral aspect of his right leg to under his foot. He does report numbness to the bottom of both feet. His pain is worse with prolonged standing and walking. He also reports difficulty sleeping due to pain. He does feel as though his right leg will give out. His wife reports that he is depressed. He reports episodes of urinary incontinence without the urge to go. He has not seen Urology. He does have an upcoming appointment with his PCP. He denies any other health changes. His pain today is 8/10.    Interval History 6/22/21:  Recently treated for acute cystitis, symptoms resolved.  Continues to use cymbalta 30mg / day gabapentin 600mg TID.  Continues to have substantial lower back pain and right lower extremity radiculopathy.  He has previously been in physical therapy before [facility] and had to discontinue formal physical therapy but was able to continue doing exercise program on his own at home but continued to have substantial lower back pain and radicular symptoms at this time.      Interval history 09/16/2020:  Since previous encounter the patient has been taking gabapentin 1800 mg per day without any side effects and some improvement in his symptoms, and we will previously scheduled to perform a the epidural injection for his lower extremity radicular pain but was canceled secondary to COVID.  Currently he continues to have bilateral lower extremity radicular pain symptoms worse on the right.  Previous EMG showed mild chronic bilateral lower  extremity radicular pains     Interval History 4/7/20:  Follow up for lumbar radicular pain. Patient's scheduled L5-S1 ILESI has been postponed due to covid outbreak.He continues to have lower back pain with bilateral radicular pain to his heels. His gabapentin was increased to 600 mg tid since last visit. He notices slight improvement in pain since increasing gabapentin. He also takes Norco 10 tid prn that is prescribed by his PCP. He is also taking Cymbalta 20 mg qday.     Interval history 03/11/2020:  Since previous encounter the patient is status post right-sided L2-3 and L4-5 transforaminal epidural steroid injections on 02/27/2020 he had about 30% improvement from the previous injection, he has also had some improvement from increasing his gabapentin to 300 mg t.i.d..  He does not have any side effects to this medication.  He continues to have weakness in his right lower extremity continues to have radicular pain symptoms, he is undergoing physical therapy without any worsening of his symptoms or significant improvement.  He needs paperwork filled out for FMLA for his with job.    Initial encounter:    Jhony Matute presents to the clinic for the evaluation of lower back pain. The pain started several eyars ago following no inciting event and symptoms have been unchanged.    Brief history:    Pain Description:    The pain is located in the lumbosacral area and radiates to the BLE to his heels. For several years he has had numbness of his Right lateral thigh. He endorses weakness in his thighs after prolonged sitting and sometimes feels he needs to prop himself up at his desk.     At BEST  8/10     At WORST  10/10 on the WORST day.      On average pain is rated as 10/10.     Today the pain is rated as 9/10    The pain is described as numbing, sharp and shooting      Symptoms interfere with daily activity, sleeping and work.     Exacerbating factors: Sitting, Bending and Walking.      Mitigating factors  "medications.     Patient denies night fever/night sweats, urinary incontinence, bowel incontinence and significant weight loss.  Patient denies any suicidal or homicidal ideations    Pain Medications:  Current:  Gabapentin  Flexeril  Nabumetone  Cymbalta  Norco    Tried in Past:  NSAIDs -nabumetone  TCA -Never  SNRI -Never  Anti-convulsants -gabapentin  Muscle Relaxants -flexeril  Opioids-norco    Physical Therapy/Home Exercise: yes (in 2015 or 2016) "did not help much"     report:  Reviewed and consistent with medication use as prescribed.    Pain Procedures:   -11/15/19 R L4-5 and L5-S1 TFESI  - 3 other ESIs over past 3 years in [place]  02/27/2020-right L2-3 and L4-5 transforaminal epidural steroid  10/19/2021- Right L4/5 and L5/S1 TF [place]  1/26/2022- L4/5 IL [place]    Chiropractor -never  Acupuncture - never  TENS unit -never  Spinal decompression -never  Joint replacement -never    Imaging:   MRI Lumbar Spine 11/23/2021:    COMPARISON:  Lumbar spine MRI 01/17/2020     FINDINGS:  Vertebral alignment is normal.     No compression fractures.  No marrow replacing lesions.     Multilevel degenerative changes with disc desiccation and disc space narrowing, described in detail below.  No significant bone marrow edema.     The conus medullaris has a normal appearance and terminates at the L1 level.  Cauda equina nerve roots are unremarkable.     Paraspinal soft tissues are unremarkable.     SIGNIFICANT FINDINGS BY LEVEL:     T12-L1: Mild disc bulge, eccentric to the left.  No canal stenosis.  Mild left foraminal stenosis.     L1-2: Disc bulge.  No canal stenosis.  Mild bilateral foraminal stenosis.     L2-3: Disc bulge.  Mild bilateral facet arthropathy.  No canal stenosis.  Mild bilateral foraminal stenosis.     L3-4: Disc bulge.  Mild bilateral facet arthropathy.  No canal stenosis.  Mild-moderate foraminal stenosis bilaterally.     L4-5: Disc bulge.  Mild bilateral facet arthropathy.  No canal stenosis.  Moderate " bilateral foraminal stenosis.     L5-S1: Disc bulge.  Bilateral facet arthropathy.  No canal stenosis.  Mild bilateral foraminal stenosis.     Impression:     Progressive multilevel degenerative changes, most advanced at L4-5 where there is moderate bilateral foraminal stenosis.      Past Medical History:   Diagnosis Date    Hypertension      Past Surgical History:   Procedure Laterality Date    EPIDURAL STEROID INJECTION N/A 1/26/2022    Procedure: INJECTION, STEROID, EPIDURAL  L4/5 IL [place] NEEDS CONSENT;  Surgeon: Tay Nicholson MD;  Location: [facility];  Service: Pain Management;  Laterality: N/A;  1/12 RESCHEDULE    INJECTION OF ANESTHETIC AGENT AROUND NERVE Bilateral 3/1/2023    Procedure: BLOCK, NERVE BILATERAL L3,L4,L5 MEDIAL BRANCH;  Surgeon: Tay Nicholson MD;  Location: [facility];  Service: Pain Management;  Laterality: Bilateral;    TRANSFORAMINAL EPIDURAL INJECTION OF STEROID Right 11/15/2019    Procedure: LUMBAR TRANSFORAMINAL RIGHT L4/5 AND L5/S1 TF [place];  Surgeon: Michael Montes MD;  Location: [facility];  Service: Pain Management;  Laterality: Right;  NEEDS CONSENT    TRANSFORAMINAL EPIDURAL INJECTION OF STEROID Right 2/27/2020    Procedure: INJECTION, STEROID, EPIDURAL, TRANSFORAMINAL APPROACH, L2-L3 AND L4-L5;  Surgeon: Shon Brunner MD;  Location: [facility];  Service: Pain Management;  Laterality: Right;    TRANSFORAMINAL EPIDURAL INJECTION OF STEROID Right 10/19/2021    Procedure: Injection,steroid,epidural,transforaminal RIGHT L4/5 and L5/S1;  Surgeon: Shon Brunner MD;  Location: [facility];  Service: Pain Management;  Laterality: Right;  9/16 will call to r/s house damage  9/15 CB AFTER 9/27     Social History     Socioeconomic History    Marital status:    Tobacco Use    Smoking status: Never    Smokeless tobacco: Never   Substance and Sexual Activity    Alcohol use: No     Comment: socially    Drug use: No    Sexual activity: Yes     Partners:  Female     Family History   Problem Relation Age of Onset    Stroke Mother     Stroke Father     Heart attack Father        Review of patient's allergies indicates:  No Known Allergies    Current Outpatient Medications   Medication Sig    celecoxib (CELEBREX) 100 MG capsule Take 1 capsule (100 mg total) by mouth 2 (two) times daily.    cyclobenzaprine (FLEXERIL) 10 MG tablet     dextroamphetamine-amphetamine (ADDERALL) 15 mg tablet     gabapentin (NEURONTIN) 600 MG tablet Take 2 tablets (1,200 mg total) by mouth 3 (three) times daily.    HYDROcodone-acetaminophen (NORCO)  mg per tablet     icosapent ethyL (VASCEPA) 1 gram Cap Take 4 capsules by mouth once daily.    omega-3 acid ethyl esters (LOVAZA) 1 gram capsule     rosuvastatin (CRESTOR) 10 MG tablet Take 10 mg by mouth once daily.     No current facility-administered medications for this visit.     Facility-Administered Medications Ordered in Other Visits   Medication    0.9%  NaCl infusion       REVIEW OF SYSTEMS:    GENERAL:  No weight loss, malaise or fevers.  HEENT:   No recent changes in vision or hearing  NECK:  Negative for lumps, no difficulty with swallowing.  RESPIRATORY:  Negative for cough, wheezing or shortness of breath, patient denies any recent URI.  CARDIOVASCULAR:  Negative for chest pain, leg swelling or palpitations. HTN  GI:  Negative for abdominal discomfort, blood in stools or black stools or change in bowel habits.  MUSCULOSKELETAL:  See HPI.  SKIN:  Negative for lesions, rash, and itching.  PSYCH:  No mood disorder or recent psychosocial stressors.  Sleep disorder  HEMATOLOGY/LYMPHOLOGY:  Negative for prolonged bleeding, bruising easily or swollen nodes.  Patient is not currently taking any anti-coagulants  ENDO: No history of diabetes or thyroid dysfunction. gout  NEURO:   No history of headaches, syncope, paralysis, seizures or tremors.  All other reviewed and negative other than HPI.    OBJECTIVE:    Exam limited due to virtual  visit:  GENERAL: Well appearing, in no acute distress, alert and oriented x3.  PSYCH:  Mood and affect appropriate.    Previous physical exam:  There were no vitals taken for this visit.      PHYSICAL EXAMINATION:    GENERAL: Well appearing, in no acute distress, alert and oriented x3.  PSYCH:  Mood and affect appropriate.  SKIN: Skin color, texture, turgor normal, no rashes or lesions.  HEAD/FACE:  Normocephalic, atraumatic. Cranial nerves grossly intact.  CV: RRR with palpation of the radial artery.  PULM: No evidence of respiratory difficulty, symmetric chest rise.  BACK: Straight leg raise in the sitting position is positive for radicular pain bilaterally. There is pain with palpation over lumbar facet joints bilaterally. Limited ROM with pain on flexion and extension. Positive facet loading bilaterally.   EXTREMITIES: No deformities, edema, or skin discoloration. Good capillary refill.  MUSCULOSKELETAL:  There is pain with palpation over the sacroiliac joints bilaterally. FABERs is positive bilaterally 4/5 strength in right ankle with plantar and dorsiflexion. 4/5 strength in left ankle with plantar and dorsiflexion. 5/5 strength with right knee flexion and extension. 5/5 strength with left knee flexion and extension. No atrophy or tone abnormalities are noted.  NEURO: Bilateral lower extremity coordination and muscle stretch reflexes are physiologic and symmetric.  Plantar response are downgoing. No clonus.   GAIT: Antalgic- ambulates without assistance.       ASSESSMENT: 52 y.o. year old male with pain, consistent with     Encounter Diagnoses   Name Primary?    Spondylosis of lumbar region without myelopathy or radiculopathy Yes    DDD (degenerative disc disease), lumbar     Myofascial pain          PLAN:     - Previous imaging reviewed today.    - He is s/p bilateral L3,4,5 MBB with 80% relief.     - Schedule for repeat bilateral L3,4,5 MBB.     - If significant relief, we will proceed with RFA.     -  Continue Celebrex, Flexeril,  and Gabapentin, refills provided.     - I have stressed the importance of physical activity and a home exercise plan to help with pain and improve health.    - RTC 1 week after block.     The above plan and management options were discussed at length with patient. Patient is in agreement with the above and verbalized understanding.     Irina Echeverria  03/08/2023

## 2023-03-10 ENCOUNTER — PATIENT MESSAGE (OUTPATIENT)
Dept: PAIN MEDICINE | Facility: OTHER | Age: 53
End: 2023-03-10
Payer: MEDICAID

## 2023-04-18 ENCOUNTER — PATIENT MESSAGE (OUTPATIENT)
Dept: PAIN MEDICINE | Facility: OTHER | Age: 53
End: 2023-04-18
Payer: MEDICAID

## 2023-04-19 ENCOUNTER — HOSPITAL ENCOUNTER (OUTPATIENT)
Facility: OTHER | Age: 53
Discharge: HOME | End: 2023-04-19
Attending: ANESTHESIOLOGY | Admitting: ANESTHESIOLOGY
Payer: MEDICAID

## 2023-04-19 VITALS
WEIGHT: 250 LBS | OXYGEN SATURATION: 98 % | HEART RATE: 70 BPM | RESPIRATION RATE: 16 BRPM | BODY MASS INDEX: 33.13 KG/M2 | HEIGHT: 73 IN | DIASTOLIC BLOOD PRESSURE: 78 MMHG | SYSTOLIC BLOOD PRESSURE: 142 MMHG | TEMPERATURE: 98 F

## 2023-04-19 DIAGNOSIS — M47.896 OTHER OSTEOARTHRITIS OF SPINE, LUMBAR REGION: Primary | ICD-10-CM

## 2023-04-19 DIAGNOSIS — G89.29 CHRONIC PAIN: ICD-10-CM

## 2023-04-19 DIAGNOSIS — M47.819 SPONDYLOSIS WITHOUT MYELOPATHY: ICD-10-CM

## 2023-04-19 PROCEDURE — 64493 INJ PARAVERT F JNT L/S 1 LEV: CPT | Mod: 50,,, | Performed by: ANESTHESIOLOGY

## 2023-04-19 PROCEDURE — 64493 INJ PARAVERT F JNT L/S 1 LEV: CPT | Mod: RT | Performed by: ANESTHESIOLOGY

## 2023-04-19 PROCEDURE — 63600175 PHARM REV CODE 636 W HCPCS: Performed by: ANESTHESIOLOGY

## 2023-04-19 PROCEDURE — 64494 INJ PARAVERT F JNT L/S 2 LEV: CPT | Mod: 50,,, | Performed by: ANESTHESIOLOGY

## 2023-04-19 PROCEDURE — 25000003 PHARM REV CODE 250: Performed by: ANESTHESIOLOGY

## 2023-04-19 PROCEDURE — 25500020 PHARM REV CODE 255: Performed by: ANESTHESIOLOGY

## 2023-04-19 PROCEDURE — 25000003 PHARM REV CODE 250: Performed by: STUDENT IN AN ORGANIZED HEALTH CARE EDUCATION/TRAINING PROGRAM

## 2023-04-19 PROCEDURE — 64494 INJ PARAVERT F JNT L/S 2 LEV: CPT | Mod: RT | Performed by: ANESTHESIOLOGY

## 2023-04-19 PROCEDURE — 64494 PR INJ DX/THER AGNT PARAVERT FACET JOINT,IMG GUIDE,LUMBAR/SAC, 2ND LEVEL: ICD-10-PCS | Mod: 50,,, | Performed by: ANESTHESIOLOGY

## 2023-04-19 PROCEDURE — 64493 PR INJ DX/THER AGNT PARAVERT FACET JOINT,IMG GUIDE,LUMBAR/SAC,1ST LVL: ICD-10-PCS | Mod: 50,,, | Performed by: ANESTHESIOLOGY

## 2023-04-19 RX ORDER — SODIUM CHLORIDE 9 MG/ML
500 INJECTION, SOLUTION INTRAVENOUS CONTINUOUS
Status: ACTIVE | OUTPATIENT
Start: 2023-04-19 | End: 2023-04-20

## 2023-04-19 RX ORDER — MIDAZOLAM HYDROCHLORIDE 1 MG/ML
INJECTION INTRAMUSCULAR; INTRAVENOUS
Status: DISCONTINUED | OUTPATIENT
Start: 2023-04-19 | End: 2023-04-19 | Stop reason: HOSPADM

## 2023-04-19 RX ORDER — BUPIVACAINE HYDROCHLORIDE 2.5 MG/ML
INJECTION, SOLUTION EPIDURAL; INFILTRATION; INTRACAUDAL
Status: DISCONTINUED | OUTPATIENT
Start: 2023-04-19 | End: 2023-04-19 | Stop reason: HOSPADM

## 2023-04-19 RX ORDER — LIDOCAINE HYDROCHLORIDE 20 MG/ML
INJECTION, SOLUTION INFILTRATION; PERINEURAL
Status: DISCONTINUED | OUTPATIENT
Start: 2023-04-19 | End: 2023-04-19 | Stop reason: HOSPADM

## 2023-04-19 NOTE — DISCHARGE INSTRUCTIONS

## 2023-04-19 NOTE — DISCHARGE SUMMARY
Discharge Note  Short Stay      SUMMARY     Admit Date: 4/19/2023    Attending Physician: Tay Nicholson    Discharge Physician: Scout Lehman      Discharge Date: 4/19/2023 2:55 PM    Procedure(s) (LRB):  BLOCK, NERVE, BILATERAL L3,L4,L5 NEDIAL BRANCH (Bilateral)    Final Diagnosis: Spondylosis of lumbar region without myelopathy or radiculopathy [M47.816]    Disposition: Home or self care    Patient Instructions:   Current Discharge Medication List        CONTINUE these medications which have NOT CHANGED    Details   celecoxib (CELEBREX) 100 MG capsule Take 1 capsule (100 mg total) by mouth 2 (two) times daily.  Qty: 60 capsule, Refills: 1    Associated Diagnoses: DDD (degenerative disc disease), lumbar; Myofascial pain      cyclobenzaprine (FLEXERIL) 10 MG tablet Take 1 tablet (10 mg total) by mouth 3 (three) times daily as needed for Muscle spasms.  Qty: 90 tablet, Refills: 0    Associated Diagnoses: Myofascial pain      dextroamphetamine-amphetamine (ADDERALL) 15 mg tablet       gabapentin (NEURONTIN) 600 MG tablet Take 2 tablets (1,200 mg total) by mouth 3 (three) times daily.  Qty: 180 tablet, Refills: 5    Associated Diagnoses: DDD (degenerative disc disease), lumbar      HYDROcodone-acetaminophen (NORCO)  mg per tablet       icosapent ethyL (VASCEPA) 1 gram Cap Take 4 capsules by mouth once daily.      omega-3 acid ethyl esters (LOVAZA) 1 gram capsule       rosuvastatin (CRESTOR) 10 MG tablet Take 10 mg by mouth once daily.                 Discharge Diagnosis: Spondylosis of lumbar region without myelopathy or radiculopathy [M47.816]  Condition on Discharge: Stable with no complications to procedure   Diet on Discharge: Same as before.  Activity: as per instruction sheet.  Discharge to: Home with a responsible adult.  Follow up: 2-4 weeks       Please call my office or pager at [phone] if experienced any weakness or loss of sensation, fever > 101.5, pain uncontrolled with oral medications,  persistent nausea/vomiting/or diarrhea, redness or drainage from the incisions, or any other worrisome concerns. If physician on call was not reached or could not communicate with our office for any reason please go to the nearest emergency department        aTy Nicholson

## 2023-04-19 NOTE — OP NOTE
Diagnostic Lumbar Medial Branch Block Under Fluoroscopy    The procedure, risks, benefits, and options were discussed with the patient. There are no contraindications to the procedure. The patent expressed understanding and agreed to the procedure. Informed written consent was obtained prior to the start of the procedure and can be found in the patient's chart.    PATIENT NAME: Jhony Matute   MRN: [id number]     DATE OF PROCEDURE: 04/19/2023                                           PROCEDURE:  Diagnostic Bilateral L3, L4, and L5 Lumbar Medial Branch Block under Fluoroscopy    PRE-OP DIAGNOSIS: Spondylosis of lumbar region without myelopathy or radiculopathy [M47.816] Lumbar spondylosis [M47.816]    POST-OP DIAGNOSIS: Same    PHYSICIAN: Tay Nicholson MD    ASSISTANTS: Scout Lehman MD    MEDICATIONS INJECTED:  Bupivicaine 0.25%    LOCAL ANESTHETIC INJECTED:   Xylocaine 2%    SEDATION: Versed 2mg                                                                                                                                                                       Conscious sedation ordered by M.D. Patient re-evaluation prior to administration of conscious sedation. No changes noted in patient's status from initial evaluation. The patient's vital signs were monitored by RN and patient remained hemodynamically stable throughout the procedure.    Event Time In   Sedation Start 1501   Sedation End 1507       ESTIMATED BLOOD LOSS:  None    COMPLICATIONS:  None.    INTERVAL HISTORY: Patient has clinical and imaging findings suggestive of facet mediated pain. Patient had a previous diagnostic block performed with at least 80% relief in pain and/or at least 50% improvement in the ability to perform previously painful movements and ADLs for the expected duration of the local anesthetic utilized.    TECHNIQUE: Time-out was performed to identify the patient and procedure to be performed. With the patient laying in a prone  position, the surgical area was prepped and draped in the usual sterile fashion using ChloraPrep and fenestrated drape. The levels were determined under fluoroscopic guidance. Skin anesthesia was achieved by injecting Lidocaine 2% over the injection sites. A 22 gauge, 3.5 inch needle was introduced into the medial branch nerves at the junctions of the superior articular process and the transverse processes of the targeted sites using AP, lateral and/or contralateral oblique fluoroscopic imaging. After negative aspiration for blood or CSF was confirmed, 1 mL of the anesthetic listed above was then slowly injected at each site. The needles were removed and bleeding was nil. A sterile dressing was applied. No specimens collected. The patient tolerated the procedure well.     The patient was monitored after the procedure in the recovery area. They were given post-procedure and discharge instructions to follow at home. The patient was discharged in a stable condition.    Scout Lehman MD  [employer] PM&R Resident     I reviewed and edited the fellow's note. I conducted my own interview and physical examination. I agree with the findings. I was present and supervising all critical portions of the procedure.    Tay Nicholson MD

## 2023-04-19 NOTE — H&P
HPI  Jhony Matute presents for Procedure(s):  BLOCK, NERVE, BILATERAL L3,L4,L5 NEDIAL BRANCH    Recent anticoagulation/antiplatelets reviewed and verified with nursing.  Recent antibiotics/recent infections reviewed and verified with nursing.    Past Medical History:   Diagnosis Date    Hypertension      Past Surgical History:   Procedure Laterality Date    EPIDURAL STEROID INJECTION N/A 1/26/2022    Procedure: INJECTION, STEROID, EPIDURAL  L4/5 IL [place] NEEDS CONSENT;  Surgeon: Tay Nicholson MD;  Location: [facility]IN [facility]GT;  Service: Pain Management;  Laterality: N/A;  1/12 RESCHEDULE    INJECTION OF ANESTHETIC AGENT AROUND NERVE Bilateral 3/1/2023    Procedure: BLOCK, NERVE BILATERAL L3,L4,L5 MEDIAL BRANCH;  Surgeon: Tay Nicholson MD;  Location: [facility] PAIN [facility]GT;  Service: Pain Management;  Laterality: Bilateral;    TRANSFORAMINAL EPIDURAL INJECTION OF STEROID Right 11/15/2019    Procedure: LUMBAR TRANSFORAMINAL RIGHT L4/5 AND L5/S1 TF [place];  Surgeon: Michael Montes MD;  Location: [facility] PAIN [facility]GT;  Service: Pain Management;  Laterality: Right;  NEEDS CONSENT    TRANSFORAMINAL EPIDURAL INJECTION OF STEROID Right 2/27/2020    Procedure: INJECTION, STEROID, EPIDURAL, TRANSFORAMINAL APPROACH, L2-L3 AND L4-L5;  Surgeon: Shon Brunner MD;  Location: [facility] PAIN [facility]GT;  Service: Pain Management;  Laterality: Right;    TRANSFORAMINAL EPIDURAL INJECTION OF STEROID Right 10/19/2021    Procedure: Injection,steroid,epidural,transforaminal RIGHT L4/5 and L5/S1;  Surgeon: Shon Brunner MD;  Location: [facility] PAIN [facility]GT;  Service: Pain Management;  Laterality: Right;  9/16 will call to r/s house [facility]  9/15 CB AFTER 9/27     Review of patient's allergies indicates:  No Known Allergies     PMHx, PSHx, Allergies, Medications reviewed in epic      ROS negative except pain complaints in HPI    OBJECTIVE:    There were no vitals taken for this visit.    PHYSICAL EXAMINATION:    GENERAL: Well appearing, in no acute distress, alert  and oriented to name, situation, location.  PSYCH:  Mood and affect appropriate.  SKIN: Skin color, texture, turgor normal, no rashes or lesions at site of procedure.  CV: regular rate with palpation of the radial artery.  PULM: No evidence of respiratory difficulty, symmetric chest rise. No audible abnormal respiratory sounds.  NEURO: Cranial nerves grossly intact.    Plan:    Proceed with procedure as planned    Armando Longo  04/19/2023

## 2023-04-21 ENCOUNTER — PATIENT MESSAGE (OUTPATIENT)
Dept: ADMINISTRATIVE | Facility: OTHER | Age: 53
End: 2023-04-21
Payer: MEDICAID

## 2023-04-21 ENCOUNTER — TELEPHONE (OUTPATIENT)
Dept: PAIN MEDICINE | Facility: CLINIC | Age: 53
End: 2023-04-21
Payer: MEDICAID

## 2023-04-21 NOTE — TELEPHONE ENCOUNTER
Staff spoke with pt in regards to his overall relief after procedure on 4/19/23 b/l MBB L3-L5 was 100 percent that we will send to  to review on Monday.

## 2023-04-21 NOTE — TELEPHONE ENCOUNTER
----- Message from Koko Ledezma sent at 4/21/2023 12:59 PM CDT -----  Regarding: CAll BAck  Pain Diary          Hour 1 90 percent pain score 2        Hour 2 90 percent pain score 2        Hour 3 90 percent pain score 2      Hour 4 90 percent pain score 2        Hour 5 80 percent pain score 4          Hour 6 80 percent pain score 4         Hour 12  60 percent pain score 6          Hour 24 50 percent pain score 8        Additional Notes

## 2023-04-25 ENCOUNTER — TELEPHONE (OUTPATIENT)
Dept: PAIN MEDICINE | Facility: CLINIC | Age: 53
End: 2023-04-25
Payer: MEDICAID

## 2023-04-25 DIAGNOSIS — M47.816 SPONDYLOSIS OF LUMBAR REGION WITHOUT MYELOPATHY OR RADICULOPATHY: Primary | ICD-10-CM

## 2023-04-25 NOTE — TELEPHONE ENCOUNTER
----- Message from Amadeo Nichole MA sent at 4/25/2023 10:30 AM CDT -----    ----- Message -----  From: Amadeo Nichole MA  Sent: 4/21/2023   2:16 PM CDT  To: Amadeo Nichole MA    Staff spoke with pt in regards to his overall relief after procedure on 4/19/23 b/l MBB L3-L5 was 100 percent that we will send to  to review on Monday.

## 2023-04-26 ENCOUNTER — PATIENT MESSAGE (OUTPATIENT)
Dept: PAIN MEDICINE | Facility: OTHER | Age: 53
End: 2023-04-26
Payer: MEDICAID

## 2023-05-31 RX ORDER — SODIUM CHLORIDE 9 MG/ML
INJECTION, SOLUTION INTRAVENOUS CONTINUOUS
Status: CANCELLED | OUTPATIENT
Start: 2023-05-31

## 2023-06-28 ENCOUNTER — HOSPITAL ENCOUNTER (OUTPATIENT)
Facility: OTHER | Age: 53
Discharge: HOME | End: 2023-06-28
Attending: ANESTHESIOLOGY | Admitting: ANESTHESIOLOGY
Payer: MEDICARE

## 2023-06-28 VITALS
DIASTOLIC BLOOD PRESSURE: 77 MMHG | OXYGEN SATURATION: 94 % | HEART RATE: 74 BPM | RESPIRATION RATE: 16 BRPM | TEMPERATURE: 98 F | HEIGHT: 72 IN | SYSTOLIC BLOOD PRESSURE: 135 MMHG | BODY MASS INDEX: 33.86 KG/M2 | WEIGHT: 250 LBS

## 2023-06-28 DIAGNOSIS — M47.816 SPONDYLOSIS OF LUMBAR REGION WITHOUT MYELOPATHY OR RADICULOPATHY: ICD-10-CM

## 2023-06-28 DIAGNOSIS — M47.819 SPONDYLOSIS WITHOUT MYELOPATHY: ICD-10-CM

## 2023-06-28 DIAGNOSIS — G89.29 CHRONIC PAIN: ICD-10-CM

## 2023-06-28 PROCEDURE — 64636 DESTROY L/S FACET JNT ADDL: CPT | Mod: LT | Performed by: ANESTHESIOLOGY

## 2023-06-28 PROCEDURE — 64636 DESTROY L/S FACET JNT ADDL: CPT | Mod: LT,,, | Performed by: ANESTHESIOLOGY

## 2023-06-28 PROCEDURE — 63600175 PHARM REV CODE 636 W HCPCS: Performed by: ANESTHESIOLOGY

## 2023-06-28 PROCEDURE — 64635 DESTROY LUMB/SAC FACET JNT: CPT | Mod: LT | Performed by: ANESTHESIOLOGY

## 2023-06-28 PROCEDURE — 64635 DESTROY LUMB/SAC FACET JNT: CPT | Mod: LT,,, | Performed by: ANESTHESIOLOGY

## 2023-06-28 PROCEDURE — 25000003 PHARM REV CODE 250: Performed by: ANESTHESIOLOGY

## 2023-06-28 PROCEDURE — 64635 PR DESTROY LUMB/SAC FACET JNT: ICD-10-PCS | Mod: LT,,, | Performed by: ANESTHESIOLOGY

## 2023-06-28 PROCEDURE — 64636 PR DESTROY L/S FACET JNT ADDL: ICD-10-PCS | Mod: LT,,, | Performed by: ANESTHESIOLOGY

## 2023-06-28 PROCEDURE — 25000003 PHARM REV CODE 250

## 2023-06-28 RX ORDER — MIDAZOLAM HYDROCHLORIDE 1 MG/ML
INJECTION INTRAMUSCULAR; INTRAVENOUS
Status: DISCONTINUED | OUTPATIENT
Start: 2023-06-28 | End: 2023-06-28 | Stop reason: HOSPADM

## 2023-06-28 RX ORDER — BUPIVACAINE HYDROCHLORIDE 2.5 MG/ML
INJECTION, SOLUTION EPIDURAL; INFILTRATION; INTRACAUDAL
Status: DISCONTINUED | OUTPATIENT
Start: 2023-06-28 | End: 2023-06-28 | Stop reason: HOSPADM

## 2023-06-28 RX ORDER — DEXAMETHASONE SODIUM PHOSPHATE 10 MG/ML
INJECTION INTRAMUSCULAR; INTRAVENOUS
Status: DISCONTINUED | OUTPATIENT
Start: 2023-06-28 | End: 2023-06-28 | Stop reason: HOSPADM

## 2023-06-28 RX ORDER — FENTANYL CITRATE 50 UG/ML
INJECTION, SOLUTION INTRAMUSCULAR; INTRAVENOUS
Status: DISCONTINUED | OUTPATIENT
Start: 2023-06-28 | End: 2023-06-28 | Stop reason: HOSPADM

## 2023-06-28 RX ORDER — LIDOCAINE HYDROCHLORIDE 20 MG/ML
INJECTION, SOLUTION INFILTRATION; PERINEURAL
Status: DISCONTINUED | OUTPATIENT
Start: 2023-06-28 | End: 2023-06-28 | Stop reason: HOSPADM

## 2023-06-28 RX ORDER — SODIUM CHLORIDE 9 MG/ML
INJECTION, SOLUTION INTRAVENOUS CONTINUOUS
Status: DISCONTINUED | OUTPATIENT
Start: 2023-06-28 | End: 2023-06-28 | Stop reason: HOSPADM

## 2023-06-28 NOTE — DISCHARGE SUMMARY
Discharge Note  Short Stay      SUMMARY     Admit Date: 6/28/2023    Attending Physician: Tay Nicholson      Discharge Physician: Jd Barton      Discharge Date: 6/28/2023 1:45 PM    Procedure(s) (LRB):  RADIOFREQUENCY ABLATION LEFT L3,L4,L5 (Left)    Final Diagnosis: Spondylosis of lumbar region without myelopathy or radiculopathy [M47.816]    Disposition: Home or self care    Patient Instructions:   Current Discharge Medication List        CONTINUE these medications which have NOT CHANGED    Details   celecoxib (CELEBREX) 100 MG capsule Take 1 capsule (100 mg total) by mouth 2 (two) times daily.  Qty: 60 capsule, Refills: 1    Associated Diagnoses: DDD (degenerative disc disease), lumbar; Myofascial pain      cyclobenzaprine (FLEXERIL) 10 MG tablet Take 1 tablet (10 mg total) by mouth 3 (three) times daily as needed for Muscle spasms.  Qty: 90 tablet, Refills: 0    Associated Diagnoses: Myofascial pain      dextroamphetamine-amphetamine (ADDERALL) 15 mg tablet       gabapentin (NEURONTIN) 600 MG tablet Take 2 tablets (1,200 mg total) by mouth 3 (three) times daily.  Qty: 180 tablet, Refills: 5    Associated Diagnoses: DDD (degenerative disc disease), lumbar      HYDROcodone-acetaminophen (NORCO)  mg per tablet       icosapent ethyL (VASCEPA) 1 gram Cap Take 4 capsules by mouth once daily.      omega-3 acid ethyl esters (LOVAZA) 1 gram capsule       rosuvastatin (CRESTOR) 10 MG tablet Take 10 mg by mouth once daily.                 Discharge Diagnosis: Spondylosis of lumbar region without myelopathy or radiculopathy [M47.816]  Condition on Discharge: Stable with no complications to procedure   Diet on Discharge: Same as before.  Activity: as per instruction sheet.  Discharge to: Home with a responsible adult.  Follow up: 2-4 weeks

## 2023-06-28 NOTE — OP NOTE
Therapeutic Lumbar Medial Branch Radiofrequency Ablation under Fluoroscopy     The procedure, risks, benefits, and options were discussed with the patient. There are no contraindications to the procedure. The patent expressed understanding and agreed to the procedure. Informed written consent was obtained prior to the start of the procedure and can be found in the patient's chart.        PATIENT NAME: Jhony Matute   MRN: [id number]     DATE OF PROCEDURE: 06/28/2023     PROCEDURE:  Left L3, L4, and L5 Lumbar Radiofrequency Ablation under Fluoroscopy    PRE-OP DIAGNOSIS: Spondylosis of lumbar region without myelopathy or radiculopathy [M47.816] Lumbar spondylosis [M47.816]    POST-OP DIAGNOSIS: Same    PHYSICIAN: Tay Nicholson MD    ASSISTANTS: N/a     MEDICATIONS INJECTED:  Preservative-free Decadron 10mg with 9cc of Bupivicaine 0.25%    LOCAL ANESTHETIC INJECTED:   Xylocaine 2%    SEDATION: Versed 2mg and Fentanyl 100mcg                                                                                                                                                                                     Conscious sedation ordered by M.ERIC. Patient re-evaluation prior to administration of conscious sedation. No changes noted in patient's status from initial evaluation. The patient's vital signs were monitored by RN and patient remained hemodynamically stable throughout the procedure.    Event Time In   Sedation Start 1344   Sedation End 1353       ESTIMATED BLOOD LOSS:  None    COMPLICATIONS:  None     INTERVAL HISTORY: Patient has clinical and imaging findings suggestive of facet mediated pain. Patients has completed 2 previous diagnostic medial branch blocks at specified levels with at least 80% relief for the expected duration of the local anesthetic utilized.    TECHNIQUE: Time-out was performed to identify the patient and procedure to be performed. With the patient laying in a prone position, the surgical area was  prepped and draped in the usual sterile fashion using ChloraPrep and fenestrated drape. The levels were determined under fluoroscopic guidance. Skin anesthesia was achieved by injecting Lidocaine 2% over the injection sites. A 20 gauge 10mm curved active tip needle was introduced to the anatomic local of the medial branch at each of the above levels using AP, lateral and/or contralateral oblique fluoroscopic imaging. Then sensory and motor testing was performed to confirm that the needle tips were in the correct location. After negative aspiration for blood or CSF was confirmed, 1 mL of the lidocaine 2% listed above was injected slowly at each site. This was followed by thermal lesioning at 80 degrees celsius for 90 seconds. That was followed by slowly injecting 5 mL of the medication mixture listed above at each site. The needles were removed and bleeding was nil. A sterile dressing was applied. No specimens collected. The patient tolerated the procedure well and did not have any procedure related motor deficit at the conclusion of the procedure.    The patient was monitored after the procedure in the recovery area. They were given post-procedure and discharge instructions to follow at home. The patient was discharged in a stable condition.    I reviewed and edited the fellow's note. I conducted my own interview and physical examination. I agree with the findings. I was present and supervising all critical portions of the procedure.    Tay Nicholson MD

## 2023-06-28 NOTE — H&P
HPI  Pt presents for left L3,4,5 RFA        Past Medical History:   Diagnosis Date    Hypertension      Past Surgical History:   Procedure Laterality Date    EPIDURAL STEROID INJECTION N/A 1/26/2022    Procedure: INJECTION, STEROID, EPIDURAL  L4/5 [place] NEEDS CONSENT;  Surgeon: Tay Nicholson MD;  Location: [facility]IN [facility]GT;  Service: Pain Management;  Laterality: N/A;  1/12 RESCHEDULE    INJECTION OF ANESTHETIC AGENT AROUND NERVE Bilateral 3/1/2023    Procedure: BLOCK, NERVE BILATERAL L3,L4,L5 MEDIAL BRANCH;  Surgeon: Tay Nicholson MD;  Location: [facility];  Service: Pain Management;  Laterality: Bilateral;    INJECTION OF ANESTHETIC AGENT AROUND NERVE Bilateral 4/19/2023    Procedure: BLOCK, NERVE, BILATERAL L3,L4,L5 NEDIAL BRANCH;  Surgeon: Tay Nicholson MD;  Location: [facility];  Service: Pain Management;  Laterality: Bilateral;    TRANSFORAMINAL EPIDURAL INJECTION OF STEROID Right 11/15/2019    Procedure: LUMBAR TRANSFORAMINAL RIGHT L4/5 AND L5/S1 TF [place];  Surgeon: Michael Montes MD;  Location: [facility] [place]IN [facility]GT;  Service: Pain Management;  Laterality: Right;  NEEDS CONSENT    TRANSFORAMINAL EPIDURAL INJECTION OF STEROID Right 2/27/2020    Procedure: INJECTION, STEROID, EPIDURAL, TRANSFORAMINAL APPROACH, L2-L3 AND L4-L5;  Surgeon: Shon Brunner MD;  Location: [facility] [place]IN [facility];  Service: Pain Management;  Laterality: Right;    TRANSFORAMINAL EPIDURAL INJECTION OF STEROID Right 10/19/2021    Procedure: Injection,steroid,epidural,transforaminal RIGHT L4/5 and L5/S1;  Surgeon: Shon Brunner MD;  Location: [facility] [place] PAIN [facility]GT;  Service: Pain Management;  Laterality: Right;  9/16 will call to r/s house damage  9/15 CB AFTER 9/27     Review of patient's allergies indicates:  No Known Allergies     PMHx, PSHx, Allergies, Medications reviewed in epic      ROS negative except pain complaints in HPI    OBJECTIVE:    There were no vitals taken for this visit.    PHYSICAL EXAMINATION:    GENERAL: Well  appearing, in no acute distress, alert and oriented x3.  PSYCH:  Mood and affect appropriate.  SKIN: Skin color, texture, turgor normal, no rashes or lesions.  CV: RRR with palpation of the radial artery.  PULM: No evidence of respiratory difficulty, symmetric chest rise. Clear to auscultation.  NEURO: Cranial nerves grossly intact.    Plan:    Proceed with procedure as planned    Jd Barton  06/28/2023

## 2023-06-28 NOTE — DISCHARGE INSTRUCTIONS

## 2023-07-03 ENCOUNTER — TELEPHONE (OUTPATIENT)
Dept: PAIN MEDICINE | Facility: CLINIC | Age: 53
End: 2023-07-03
Payer: MEDICARE

## 2023-07-03 NOTE — TELEPHONE ENCOUNTER
Staff confirmed his schedule appointment on 7/5 with LORA Echeverria for 10 am and switched it to virtual .

## 2023-07-05 ENCOUNTER — OFFICE VISIT (OUTPATIENT)
Dept: PAIN MEDICINE | Facility: CLINIC | Age: 53
End: 2023-07-05
Payer: MEDICAID

## 2023-07-05 DIAGNOSIS — M47.816 SPONDYLOSIS OF LUMBAR REGION WITHOUT MYELOPATHY OR RADICULOPATHY: Primary | ICD-10-CM

## 2023-07-05 PROCEDURE — 99499 UNLISTED E&M SERVICE: CPT | Mod: 95,,, | Performed by: NURSE PRACTITIONER

## 2023-07-05 PROCEDURE — 99499 NO LOS: ICD-10-PCS | Mod: 95,,, | Performed by: NURSE PRACTITIONER

## 2023-07-12 ENCOUNTER — HOSPITAL ENCOUNTER (OUTPATIENT)
Facility: OTHER | Age: 53
Discharge: HOME | End: 2023-07-12
Attending: ANESTHESIOLOGY | Admitting: ANESTHESIOLOGY
Payer: MEDICAID

## 2023-07-12 VITALS
HEART RATE: 64 BPM | OXYGEN SATURATION: 98 % | RESPIRATION RATE: 16 BRPM | TEMPERATURE: 98 F | SYSTOLIC BLOOD PRESSURE: 152 MMHG | BODY MASS INDEX: 33.86 KG/M2 | DIASTOLIC BLOOD PRESSURE: 92 MMHG | WEIGHT: 250 LBS | HEIGHT: 72 IN

## 2023-07-12 DIAGNOSIS — M47.819 SPONDYLOSIS WITHOUT MYELOPATHY: ICD-10-CM

## 2023-07-12 DIAGNOSIS — M47.816 SPONDYLOSIS OF LUMBAR REGION WITHOUT MYELOPATHY OR RADICULOPATHY: Primary | ICD-10-CM

## 2023-07-12 DIAGNOSIS — G89.29 CHRONIC PAIN: ICD-10-CM

## 2023-07-12 PROCEDURE — 64636 PR DESTROY L/S FACET JNT ADDL: ICD-10-PCS | Mod: RT,,, | Performed by: ANESTHESIOLOGY

## 2023-07-12 PROCEDURE — 64635 PR DESTROY LUMB/SAC FACET JNT: ICD-10-PCS | Mod: RT,,, | Performed by: ANESTHESIOLOGY

## 2023-07-12 PROCEDURE — 63600175 PHARM REV CODE 636 W HCPCS: Performed by: ANESTHESIOLOGY

## 2023-07-12 PROCEDURE — 64635 DESTROY LUMB/SAC FACET JNT: CPT | Mod: RT | Performed by: ANESTHESIOLOGY

## 2023-07-12 PROCEDURE — 64636 DESTROY L/S FACET JNT ADDL: CPT | Mod: RT,,, | Performed by: ANESTHESIOLOGY

## 2023-07-12 PROCEDURE — 64635 DESTROY LUMB/SAC FACET JNT: CPT | Mod: RT,,, | Performed by: ANESTHESIOLOGY

## 2023-07-12 PROCEDURE — 25000003 PHARM REV CODE 250: Performed by: ANESTHESIOLOGY

## 2023-07-12 PROCEDURE — 64636 DESTROY L/S FACET JNT ADDL: CPT | Mod: RT | Performed by: ANESTHESIOLOGY

## 2023-07-12 RX ORDER — BUPIVACAINE HYDROCHLORIDE 2.5 MG/ML
INJECTION, SOLUTION EPIDURAL; INFILTRATION; INTRACAUDAL
Status: DISCONTINUED | OUTPATIENT
Start: 2023-07-12 | End: 2023-07-12 | Stop reason: HOSPADM

## 2023-07-12 RX ORDER — SODIUM CHLORIDE 9 MG/ML
INJECTION, SOLUTION INTRAVENOUS CONTINUOUS
Status: ACTIVE | OUTPATIENT
Start: 2023-07-12

## 2023-07-12 RX ORDER — MIDAZOLAM HYDROCHLORIDE 1 MG/ML
INJECTION INTRAMUSCULAR; INTRAVENOUS
Status: DISCONTINUED | OUTPATIENT
Start: 2023-07-12 | End: 2023-07-12 | Stop reason: HOSPADM

## 2023-07-12 RX ORDER — DEXAMETHASONE SODIUM PHOSPHATE 10 MG/ML
INJECTION INTRAMUSCULAR; INTRAVENOUS
Status: DISCONTINUED | OUTPATIENT
Start: 2023-07-12 | End: 2023-07-12 | Stop reason: HOSPADM

## 2023-07-12 RX ORDER — FENTANYL CITRATE 50 UG/ML
INJECTION, SOLUTION INTRAMUSCULAR; INTRAVENOUS
Status: DISCONTINUED | OUTPATIENT
Start: 2023-07-12 | End: 2023-07-12 | Stop reason: HOSPADM

## 2023-07-12 RX ORDER — LIDOCAINE HYDROCHLORIDE 20 MG/ML
INJECTION, SOLUTION INFILTRATION; PERINEURAL
Status: DISCONTINUED | OUTPATIENT
Start: 2023-07-12 | End: 2023-07-12 | Stop reason: HOSPADM

## 2023-07-12 NOTE — DISCHARGE SUMMARY
Discharge Note  Short Stay      SUMMARY     Admit Date: 7/12/2023    Attending Physician: Tay Nicholson MD      Discharge Physician: Ed Lee      Discharge Date: 7/12/2023 11:17 AM    Procedure(s) (LRB):  RADIOFREQUENCY ABLATION RIGHT L3,L4,L5 (Right)    Final Diagnosis: Spondylosis of lumbar region without myelopathy or radiculopathy [M47.816]    Disposition: Home or self care    Patient Instructions:   Current Discharge Medication List        CONTINUE these medications which have NOT CHANGED    Details   celecoxib (CELEBREX) 100 MG capsule Take 1 capsule (100 mg total) by mouth 2 (two) times daily.  Qty: 60 capsule, Refills: 1    Associated Diagnoses: DDD (degenerative disc disease), lumbar; Myofascial pain      cyclobenzaprine (FLEXERIL) 10 MG tablet Take 1 tablet (10 mg total) by mouth 3 (three) times daily as needed for Muscle spasms.  Qty: 90 tablet, Refills: 0    Associated Diagnoses: Myofascial pain      dextroamphetamine-amphetamine (ADDERALL) 15 mg tablet       gabapentin (NEURONTIN) 600 MG tablet Take 2 tablets (1,200 mg total) by mouth 3 (three) times daily.  Qty: 180 tablet, Refills: 5    Associated Diagnoses: DDD (degenerative disc disease), lumbar      HYDROcodone-acetaminophen (NORCO)  mg per tablet       icosapent ethyL (VASCEPA) 1 gram Cap Take 4 capsules by mouth once daily.      omega-3 acid ethyl esters (LOVAZA) 1 gram capsule       rosuvastatin (CRESTOR) 10 MG tablet Take 10 mg by mouth once daily.                 Discharge Diagnosis: Spondylosis of lumbar region without myelopathy or radiculopathy [M47.816]  Condition on Discharge: Stable with no complications to procedure   Diet on Discharge: Same as before.  Activity: as per instruction sheet.  Discharge to: Home with a responsible adult.  Follow up: 2-4 weeks

## 2023-07-12 NOTE — DISCHARGE INSTRUCTIONS

## 2023-07-12 NOTE — H&P
HPI  Patient presenting for Procedure(s) (LRB):  RADIOFREQUENCY ABLATION RIGHT L3,L4,L5 (Right)     Patient on Anti-coagulation No    No health changes since previous encounter    Past Medical History:   Diagnosis Date    Hypertension      Past Surgical History:   Procedure Laterality Date    EPIDURAL STEROID INJECTION N/A 1/26/2022    Procedure: INJECTION, STEROID, EPIDURAL  L4/5 [place] NEEDS CONSENT;  Surgeon: Tay Nicholson MD;  Location: [facility]IN [facility]GT;  Service: Pain Management;  Laterality: N/A;  1/12 RESCHEDULE    INJECTION OF ANESTHETIC AGENT AROUND NERVE Bilateral 3/1/2023    Procedure: BLOCK, NERVE BILATERAL L3,L4,L5 MEDIAL BRANCH;  Surgeon: Tay Nicholson MD;  Location: [facility]IN [facility];  Service: Pain Management;  Laterality: Bilateral;    INJECTION OF ANESTHETIC AGENT AROUND NERVE Bilateral 4/19/2023    Procedure: BLOCK, NERVE, BILATERAL L3,L4,L5 NEDIAL BRANCH;  Surgeon: Tay Nicholson MD;  Location: [facility]IN [facility];  Service: Pain Management;  Laterality: Bilateral;    RADIOFREQUENCY ABLATION Left 6/28/2023    Procedure: RADIOFREQUENCY ABLATION LEFT L3,L4,L5;  Surgeon: Tay Nicholson MD;  Location: [facility]IN [facility];  Service: Pain Management;  Laterality: Left;    TRANSFORAMINAL EPIDURAL INJECTION OF STEROID Right 11/15/2019    Procedure: LUMBAR TRANSFORAMINAL RIGHT L4/5 AND L5/S1 TF [place];  Surgeon: Michael Montes MD;  Location: [facility]IN [facility];  Service: Pain Management;  Laterality: Right;  NEEDS CONSENT    TRANSFORAMINAL EPIDURAL INJECTION OF STEROID Right 2/27/2020    Procedure: INJECTION, STEROID, EPIDURAL, TRANSFORAMINAL APPROACH, L2-L3 AND L4-L5;  Surgeon: Shon Brunner MD;  Location: [facility];  Service: Pain Management;  Laterality: Right;    TRANSFORAMINAL EPIDURAL INJECTION OF STEROID Right 10/19/2021    Procedure: Injection,steroid,epidural,transforaminal RIGHT L4/5 and L5/S1;  Surgeon: Shon Brunner MD;  Location: [facility]IN [facility];  Service: Pain Management;  Laterality:  Right;  9/16 will call to r/s house damage  9/15 CB AFTER 9/27     Review of patient's allergies indicates:  No Known Allergies   No current facility-administered medications for this encounter.     Facility-Administered Medications Ordered in Other Encounters   Medication    0.9%  NaCl infusion       PMHx, PSHx, Allergies, Medications reviewed in epic    ROS negative except pain complaints in HPI    OBJECTIVE:    There were no vitals taken for this visit.    PHYSICAL EXAMINATION:    GENERAL: Well appearing, in no acute distress, alert and oriented x3.  PSYCH:  Mood and affect appropriate.  SKIN: Skin color, texture, turgor normal, no rashes or lesions which will impact the procedure.  CV: RRR with palpation of the radial artery.  PULM: No evidence of respiratory difficulty, symmetric chest rise. Clear to auscultation.  NEURO: Cranial nerves grossly intact.    Plan:    Proceed with procedure as planned Procedure(s) (LRB):  RADIOFREQUENCY ABLATION RIGHT L3,L4,L5 (Right)    Michael Kate  07/12/2023

## 2023-07-12 NOTE — OP NOTE
Therapeutic Lumbar Medial Branch Radiofrequency Ablation under Fluoroscopy     The procedure, risks, benefits, and options were discussed with the patient. There are no contraindications to the procedure. The patent expressed understanding and agreed to the procedure. Informed written consent was obtained prior to the start of the procedure and can be found in the patient's chart.        PATIENT NAME: Jhony Matute   MRN: [id number]     DATE OF PROCEDURE: 07/12/2023     PROCEDURE:  Right L3, L4, and L5 Lumbar Radiofrequency Ablation under Fluoroscopy    PRE-OP DIAGNOSIS: Spondylosis of lumbar region without myelopathy or radiculopathy [M47.816] Lumbar spondylosis [M47.816]    POST-OP DIAGNOSIS: Same    PHYSICIAN: Tay Nicholson MD    ASSISTANTS: Ed Lee M.D. ([facility] Pain Fellow), Lyle Wright MD (\[facility]\"" Pain Fellow)       MEDICATIONS INJECTED:  Preservative-free Decadron 10mg with 9cc of Bupivicaine 0.25%    LOCAL ANESTHETIC INJECTED:   Xylocaine 2%    SEDATION: Versed 2mg and Fentanyl 100mcg                                                                                                                                                                                     Conscious sedation ordered by M.D. Patient re-evaluation prior to administration of conscious sedation. No changes noted in patient's status from initial evaluation. The patient's vital signs were monitored by RN and patient remained hemodynamically stable throughout the procedure.    Event Time In   Sedation Start 1106   Sedation End 1115       ESTIMATED BLOOD LOSS:  None    COMPLICATIONS:  None     INTERVAL HISTORY: Patient has clinical and imaging findings suggestive of facet mediated pain. Patients has completed 2 previous diagnostic medial branch blocks at specified levels with at least 80% relief for the expected duration of the local anesthetic utilized.    TECHNIQUE: Time-out was performed to identify the patient and procedure to be  performed. With the patient laying in a prone position, the surgical area was prepped and draped in the usual sterile fashion using ChloraPrep and fenestrated drape. The levels were determined under fluoroscopic guidance. Skin anesthesia was achieved by injecting Lidocaine 2% over the injection sites. A 18 gauge 10mm curved active tip needle was introduced to the anatomic local of the medial branch at each of the above levels using AP, lateral and/or contralateral oblique fluoroscopic imaging. Then sensory and motor testing was performed to confirm that the needle tips were in the correct location. After negative aspiration for blood or CSF was confirmed, 1 mL of the lidocaine 2% listed above was injected slowly at each site. This was followed by thermal lesioning at 80 degrees celsius for 90 seconds. That was followed by slowly injecting 1 mL of the medication mixture listed above at each site. The needles were removed and bleeding was nil. A sterile dressing was applied. No specimens collected. The patient tolerated the procedure well and did not have any procedure related motor deficit at the conclusion of the procedure.    The patient was monitored after the procedure in the recovery area. They were given post-procedure and discharge instructions to follow at home. The patient was discharged in a stable condition.    Ed Lee MD    I reviewed and edited the fellow's note. I conducted my own interview and physical examination. I agree with the findings. I was present and supervising all critical portions of the procedure.  Tay Nicholson MD

## 2023-10-17 ENCOUNTER — OFFICE VISIT (OUTPATIENT)
Dept: PAIN MEDICINE | Facility: CLINIC | Age: 53
End: 2023-10-17
Payer: MEDICARE

## 2023-10-17 ENCOUNTER — ON-DEMAND VIRTUAL (OUTPATIENT)
Dept: URGENT CARE | Facility: CLINIC | Age: 53
End: 2023-10-17
Payer: MEDICARE

## 2023-10-17 DIAGNOSIS — M51.36 DDD (DEGENERATIVE DISC DISEASE), LUMBAR: ICD-10-CM

## 2023-10-17 DIAGNOSIS — M79.18 MYOFASCIAL PAIN: ICD-10-CM

## 2023-10-17 DIAGNOSIS — M47.816 SPONDYLOSIS OF LUMBAR REGION WITHOUT MYELOPATHY OR RADICULOPATHY: Primary | ICD-10-CM

## 2023-10-17 PROCEDURE — 99213 OFFICE O/P EST LOW 20 MIN: CPT | Mod: 95,,, | Performed by: NURSE PRACTITIONER

## 2023-10-17 PROCEDURE — 99213 PR OFFICE/OUTPT VISIT, EST, LEVL III, 20-29 MIN: ICD-10-PCS | Mod: 95,,, | Performed by: NURSE PRACTITIONER

## 2023-10-17 RX ORDER — GABAPENTIN 600 MG/1
1200 TABLET ORAL 3 TIMES DAILY
Qty: 180 TABLET | Refills: 5 | Status: SHIPPED | OUTPATIENT
Start: 2023-10-17

## 2023-10-17 RX ORDER — CYCLOBENZAPRINE HCL 10 MG
10 TABLET ORAL 3 TIMES DAILY PRN
Qty: 90 TABLET | Refills: 0 | Status: SHIPPED | OUTPATIENT
Start: 2023-10-17

## 2023-10-17 RX ORDER — CELECOXIB 100 MG/1
100 CAPSULE ORAL 2 TIMES DAILY
Qty: 60 CAPSULE | Refills: 1 | Status: SHIPPED | OUTPATIENT
Start: 2023-10-17

## 2023-10-17 NOTE — PROGRESS NOTES
Chronic Pain - Follow Up  Chronic Pain-Tele-Medicine-Established Note (Follow up visit)        The patient location is: Home  The chief complaint leading to consultation is: pain  Visit type: Virtual visit with synchronous audio and video  Total time spent with patient: 25 min  Each patient to whom he or she provides medical services by telemedicine is:  (1) informed of the relationship between the physician and patient and the respective role of any other health care provider with respect to management of the patient; and (2) notified that he or she may decline to receive medical services by telemedicine and may withdraw from such care at any time.      Referring Physician: No ref. provider found    Chief Complaint:   Chief Complaint   Patient presents with    Back Pain        SUBJECTIVE: Disclaimer: This note has been generated using voice-recognition software. There may be typographical errors that have been missed during proof-reading    Interval History 10/17/2023:  The patient returns to clinic today for follow up of back pain via virtual visit. He is s/p left L3,4,5 RFA on 6/28/2023 and right L3,4,5 RFA on 7/12/2023. He reports 50% relief of his pain. He continues to report low back pain. He reports intermittent radiating pain into his legs. His pain is worse with activity. He is taking Celebrex, Flexeril, and Gabapentin. He does need refills. He is taking Norco from an outside provider. He is interested in keeping all pain management care with our office. He denies any other health changes.     Interval History 3/8/2023:  The patient returns to clinic today for follow up of low back pain via virtual visit. He is s/p bilateral L3,4,5 MBB on 3/1/2023. He reports 80% relief of his low back pain for 3 days. His pain has returned to baseline. He continues to report low back pain, throbbing in nature. His pain is worse with bending, twisting, standing and walking. He does endorse morning stiffness. He continues  to take Celebrex, Flexeril, and Gabapentin. He denies any other health changes.     Interval History 1/24/2023:  The patient returns to clinic today for follow up of low back pain. He has not been seen in over 6 months. He was previously scheduled for MBB but this was not done. He continues to report low back pain with intermittent radiating pain into the lateral aspect of both legs to the his heels. He describes this pain as throbbing in nature. His back pain is greater than his leg pain. His pain is worse with bending, twisting, standing, and walking. He does endorse morning stiffness. He tried physical therapy with limited relief. He has had limited relief with ESIs. He is currently taking Gabapentin and Celebrex with benefit. Of note, his wife mentions that he is having episodes of urinary incontinence. He does not feel the urge to go. He denies any weakness. He denies any other health changes. His pain today is 10/10.    Interval History 6/27/2022:  The patient returns to clinic today for follow up of low back pain. He continues to report low back pain that radiates into the radiates into the lateral aspect of his right leg to his knee. He describes this pain as numb in nature. He reports intermittent radiating pain into the left leg in the same pattern. His back pain is greater than leg pain. His pain is worse with standing and walking. He is currently taking Gabapentin and Celebrex with some benefit. He did see Dr. Villarreal in Neurosurgery who recommended PT and EMG. These are scheduled. He denies any other health changes. His pain today is 9/10.    Interval History 4/18/2022:   Patient returns to clinic today for follow-up of chronic low back pain.  He has had multiple A[place]RIA with little improvement.  In addition to his back pain, he reports numbness in the lateral aspect of his right thigh with decreased sensation throughout his right lower extremity.  He is currently taking Norco, gabapentin, and Flexeril with  no reported side effects.  He also reports episodes of bladder incontinence over the past 1-2 years.  He denies any saddle anesthesia or bowel incontinence.     Interval History 2/24/2022:  The patient returns to clinic today for follow up of low back pain. He is s/p L4/5 IL [place] on 1/26/2022. He reports no relief, in fact his pain worsened after the procedure. He continues to report low back pain that radiates into the lateral aspect of both legs to his heels. He describes this pain as shocking in nature. His pain is worse with prolonged standing and walking. He is frustrated with his continued pain. He continues to take Gabapentin, Cymbalta, and Nabumetone. He has completed physical therapy in the past without relief. He denies any other health changes. His pain today is 10/10.    Interval History 11/5/2021:  The patient returns to clinic today for follow up of low back pain. He is s/p right L4/5 and L5/S1 TF [place] on 10/19/2021. He reports limited relief of his pain. He continues to report low back pain that radiates into the lateral aspect of his right leg to under his foot. He does report numbness to the bottom of both feet. His pain is worse with prolonged standing and walking. He also reports difficulty sleeping due to pain. He does feel as though his right leg will give out. His wife reports that he is depressed. He reports episodes of urinary incontinence without the urge to go. He has not seen Urology. He does have an upcoming appointment with his PCP. He denies any other health changes. His pain today is 8/10.    Interval History 6/22/21:  Recently treated for acute cystitis, symptoms resolved.  Continues to use cymbalta 30mg / day gabapentin 600mg TID.  Continues to have substantial lower back pain and right lower extremity radiculopathy.  He has previously been in physical therapy before [facility] and had to discontinue formal physical therapy but was able to continue doing exercise program on his own at home  but continued to have substantial lower back pain and radicular symptoms at this time.      Interval history 09/16/2020:  Since previous encounter the patient has been taking gabapentin 1800 mg per day without any side effects and some improvement in his symptoms, and we will previously scheduled to perform a the epidural injection for his lower extremity radicular pain but was canceled secondary to COVID.  Currently he continues to have bilateral lower extremity radicular pain symptoms worse on the right.  Previous EMG showed mild chronic bilateral lower extremity radicular pains     Interval History 4/7/20:  Follow up for lumbar radicular pain. Patient's scheduled L5-S1 ILESI has been postponed due to covid outbreak.He continues to have lower back pain with bilateral radicular pain to his heels. His gabapentin was increased to 600 mg tid since last visit. He notices slight improvement in pain since increasing gabapentin. He also takes Norco 10 tid prn that is prescribed by his PCP. He is also taking Cymbalta 20 mg qday.     Interval history 03/11/2020:  Since previous encounter the patient is status post right-sided L2-3 and L4-5 transforaminal epidural steroid injections on 02/27/2020 he had about 30% improvement from the previous injection, he has also had some improvement from increasing his gabapentin to 300 mg t.i.d..  He does not have any side effects to this medication.  He continues to have weakness in his right lower extremity continues to have radicular pain symptoms, he is undergoing physical therapy without any worsening of his symptoms or significant improvement.  He needs paperwork filled out for [place] for his with job.    Initial encounter:    Jhony Matute presents to the clinic for the evaluation of lower back pain. The pain started several eyars ago following no inciting event and symptoms have been unchanged.    Brief history:    Pain Description:    The pain is located in the lumbosacral area  "and radiates to the BLE to his heels. For several years he has had numbness of his Right lateral thigh. He endorses weakness in his thighs after prolonged sitting and sometimes feels he needs to prop himself up at his desk.     At BEST  8/10     At WORST  10/10 on the WORST day.      On average pain is rated as 10/10.     Today the pain is rated as 9/10    The pain is described as numbing, sharp and shooting      Symptoms interfere with daily activity, sleeping and work.     Exacerbating factors: Sitting, Bending and Walking.      Mitigating factors medications.     Patient denies night fever/night sweats, urinary incontinence, bowel incontinence and significant weight loss.  Patient denies any suicidal or homicidal ideations    Pain Medications:  Current:  Gabapentin  Flexeril  Nabumetone  Cymbalta  Norco    Tried in Past:  NSAIDs -nabumetone  TCA -Never  SNRI -Never  Anti-convulsants -gabapentin  Muscle Relaxants -flexeril  Opioids-norco    Physical Therapy/Home Exercise: yes (in 2015 or 2016) "did not help much"     report:  Reviewed and consistent with medication use as prescribed.    Pain Procedures:   -11/15/19 R L4-5 and L5-S1 TFESI  - 3 other ESIs over past 3 years in [place]  02/27/2020-right L2-3 and L4-5 transforaminal epidural steroid  10/19/2021- Right L4/5 and L5/S1 TF [place]  1/26/2022- L4/5 IL [place]  3/1/2023- Bilateral L3,4,5 MBB  4/19/2023- Bilateral L3,4,5 MBB'  6/28/2023- Left L3,4,5 RFA  7/12/2023- Right L3,4,5 RFA      Imaging:   MRI Lumbar Spine 2/15/2023:  COMPARISON:  11/23/2021     FINDINGS:  Alignment: Normal.     Vertebrae: Normal marrow signal. No fracture.     Discs: Disc heights are maintained.  Left foraminal zone annular fissure noted at L4-L5.     Cord: Conus terminates at L1 and appears unremarkable.  Cauda equina appears unremarkable.     Degenerative findings:     T12-L1: No spinal canal stenosis or neural foraminal narrowing.     L1-L2: Circumferential disc bulge.  No spinal " canal stenosis or neural foraminal narrowing.     L2-L3: Circumferential disc bulge and mild facet arthropathy result in mild bilateral neural foraminal narrowing.     L3-L4: Circumferential disc bulge and mild facet arthropathy result in mild bilateral neural foraminal narrowing.     L4-L5: Circumferential disc bulge and mild facet arthropathy result in moderate bilateral neural foraminal narrowing.     L5-S1: Circumferential disc bulge and mild facet arthropathy.  No spinal canal stenosis or neural foraminal narrowing.     Paraspinal muscles & soft tissues: Unremarkable.     Impression:     1. Multilevel degenerative changes of the lumbar spine, overall similar to prior study.  Moderate neural foraminal narrowing re-identified at L4-L5    MRI Lumbar Spine 11/23/2021:    COMPARISON:  Lumbar spine MRI 01/17/2020     FINDINGS:  Vertebral alignment is normal.     No compression fractures.  No marrow replacing lesions.     Multilevel degenerative changes with disc desiccation and disc space narrowing, described in detail below.  No significant bone marrow edema.     The conus medullaris has a normal appearance and terminates at the L1 level.  Cauda equina nerve roots are unremarkable.     Paraspinal soft tissues are unremarkable.     SIGNIFICANT FINDINGS BY LEVEL:     T12-L1: Mild disc bulge, eccentric to the left.  No canal stenosis.  Mild left foraminal stenosis.     L1-2: Disc bulge.  No canal stenosis.  Mild bilateral foraminal stenosis.     L2-3: Disc bulge.  Mild bilateral facet arthropathy.  No canal stenosis.  Mild bilateral foraminal stenosis.     L3-4: Disc bulge.  Mild bilateral facet arthropathy.  No canal stenosis.  Mild-moderate foraminal stenosis bilaterally.     L4-5: Disc bulge.  Mild bilateral facet arthropathy.  No canal stenosis.  Moderate bilateral foraminal stenosis.     L5-S1: Disc bulge.  Bilateral facet arthropathy.  No canal stenosis.  Mild bilateral foraminal stenosis.     Impression:      Progressive multilevel degenerative changes, most advanced at L4-5 where there is moderate bilateral foraminal stenosis.      Past Medical History:   Diagnosis Date    Hypertension      Past Surgical History:   Procedure Laterality Date    EPIDURAL STEROID INJECTION N/A 1/26/2022    Procedure: INJECTION, STEROID, EPIDURAL  L4/5 IL [place] NEEDS CONSENT;  Surgeon: Tay Nicholson MD;  Location: [facility];  Service: Pain Management;  Laterality: N/A;  1/12 RESCHEDULE    INJECTION OF ANESTHETIC AGENT AROUND NERVE Bilateral 3/1/2023    Procedure: BLOCK, NERVE BILATERAL L3,L4,L5 MEDIAL BRANCH;  Surgeon: Tay Nicholson MD;  Location: [facility]IN [facility];  Service: Pain Management;  Laterality: Bilateral;    INJECTION OF ANESTHETIC AGENT AROUND NERVE Bilateral 4/19/2023    Procedure: BLOCK, NERVE, BILATERAL L3,L4,L5 NEDIAL BRANCH;  Surgeon: Tay Nicholson MD;  Location: [facility]IN [facility];  Service: Pain Management;  Laterality: Bilateral;    RADIOFREQUENCY ABLATION Left 6/28/2023    Procedure: RADIOFREQUENCY ABLATION LEFT L3,L4,L5;  Surgeon: Tay Nicholson MD;  Location: [facility];  Service: Pain Management;  Laterality: Left;    RADIOFREQUENCY ABLATION Right 7/12/2023    Procedure: RADIOFREQUENCY ABLATION RIGHT L3,L4,L5;  Surgeon: Tay Nicholson MD;  Location: [facility];  Service: Pain Management;  Laterality: Right;    TRANSFORAMINAL EPIDURAL INJECTION OF STEROID Right 11/15/2019    Procedure: LUMBAR TRANSFORAMINAL RIGHT L4/5 AND L5/S1 TF [place];  Surgeon: Michael Montes MD;  Location: [facility];  Service: Pain Management;  Laterality: Right;  NEEDS CONSENT    TRANSFORAMINAL EPIDURAL INJECTION OF STEROID Right 2/27/2020    Procedure: INJECTION, STEROID, EPIDURAL, TRANSFORAMINAL APPROACH, L2-L3 AND L4-L5;  Surgeon: Shon Brunner MD;  Location: [facility];  Service: Pain Management;  Laterality: Right;    TRANSFORAMINAL EPIDURAL INJECTION OF STEROID Right 10/19/2021    Procedure:  Injection,steroid,epidural,transforaminal RIGHT L4/5 and L5/S1;  Surgeon: Shon Brunner MD;  Location: [facility];  Service: Pain Management;  Laterality: Right;  9/16 will call to r/s house damage  9/15 CB AFTER 9/27     Social History     Socioeconomic History    Marital status:    Tobacco Use    Smoking status: Never    Smokeless tobacco: Never   Substance and Sexual Activity    Alcohol use: No     Comment: socially    Drug use: No    Sexual activity: Yes     Partners: Female     Family History   Problem Relation Age of Onset    Stroke Mother     Stroke Father     Heart attack Father        Review of patient's allergies indicates:  No Known Allergies    Current Outpatient Medications   Medication Sig    celecoxib (CELEBREX) 100 MG capsule Take 1 capsule (100 mg total) by mouth 2 (two) times daily.    cyclobenzaprine (FLEXERIL) 10 MG tablet Take 1 tablet (10 mg total) by mouth 3 (three) times daily as needed for Muscle spasms.    dextroamphetamine-amphetamine (ADDERALL) 15 mg tablet     gabapentin (NEURONTIN) 600 MG tablet Take 2 tablets (1,200 mg total) by mouth 3 (three) times daily.    HYDROcodone-acetaminophen (NORCO)  mg per tablet     icosapent ethyL (VASCEPA) 1 gram Cap Take 4 capsules by mouth once daily.    omega-3 acid ethyl esters (LOVAZA) 1 gram capsule     rosuvastatin (CRESTOR) 10 MG tablet Take 10 mg by mouth once daily.     No current facility-administered medications for this visit.     Facility-Administered Medications Ordered in Other Visits   Medication    0.9%  NaCl infusion    0.9%  NaCl infusion       REVIEW OF SYSTEMS:    GENERAL:  No weight loss, malaise or fevers.  HEENT:   No recent changes in vision or hearing  NECK:  Negative for lumps, no difficulty with swallowing.  RESPIRATORY:  Negative for cough, wheezing or shortness of breath, patient denies any recent URI.  CARDIOVASCULAR:  Negative for chest pain, leg swelling or palpitations. HTN  GI:  Negative for  abdominal discomfort, blood in stools or black stools or change in bowel habits.  MUSCULOSKELETAL:  See HPI.  SKIN:  Negative for lesions, rash, and itching.  PSYCH:  No mood disorder or recent psychosocial stressors.  Sleep disorder  HEMATOLOGY/LYMPHOLOGY:  Negative for prolonged bleeding, bruising easily or swollen nodes.  Patient is not currently taking any anti-coagulants  ENDO: No history of diabetes or thyroid dysfunction. gout  NEURO:   No history of headaches, syncope, paralysis, seizures or tremors.  All other reviewed and negative other than HPI.    OBJECTIVE:    Exam limited due to virtual visit:  GENERAL: Well appearing, in no acute distress, alert and oriented x3.  PSYCH:  Mood and affect appropriate.    Previous physical exam:  There were no vitals taken for this visit.      PHYSICAL EXAMINATION:    GENERAL: Well appearing, in no acute distress, alert and oriented x3.  PSYCH:  Mood and affect appropriate.  SKIN: Skin color, texture, turgor normal, no rashes or lesions.  HEAD/FACE:  Normocephalic, atraumatic. Cranial nerves grossly intact.  CV: RRR with palpation of the radial artery.  PULM: No evidence of respiratory difficulty, symmetric chest rise.  BACK: Straight leg raise in the sitting position is positive for radicular pain bilaterally. There is pain with palpation over lumbar facet joints bilaterally. Limited ROM with pain on flexion and extension. Positive facet loading bilaterally.   EXTREMITIES: No deformities, edema, or skin discoloration. Good capillary refill.  MUSCULOSKELETAL:  There is pain with palpation over the sacroiliac joints bilaterally. FABERs is positive bilaterally 4/5 strength in right ankle with plantar and dorsiflexion. 4/5 strength in left ankle with plantar and dorsiflexion. 5/5 strength with right knee flexion and extension. 5/5 strength with left knee flexion and extension. No atrophy or tone abnormalities are noted.  NEURO: Bilateral lower extremity coordination and  muscle stretch reflexes are physiologic and symmetric.  Plantar response are downgoing. No clonus.   GAIT: Antalgic- ambulates without assistance.       ASSESSMENT: 52 y.o. year old male with pain, consistent with     Encounter Diagnoses   Name Primary?    Spondylosis of lumbar region without myelopathy or radiculopathy Yes    DDD (degenerative disc disease), lumbar     Myofascial pain            PLAN:     - Previous imaging reviewed today.    - He is s/p lumbar RFA with benefit.     - Consider repeat [place] if radicular pain worsens.     - Continue Celebrex, Flexeril,  and Gabapentin, refills provided.     - We discussed that any opioid medication management is under the discretion of the physician.     - I have stressed the importance of physical activity and a home exercise plan to help with pain and improve health.    - RTC in 1 month with Dr. Nicholson.     The above plan and management options were discussed at length with patient. Patient is in agreement with the above and verbalized understanding.     Irina Echeverria  10/17/2023

## 2023-10-22 NOTE — PROGRESS NOTES
Chronic Pain - Follow Up    Referring Physician: No ref. provider found    Chief Complaint:   Chief Complaint   Patient presents with    Leg Pain    Low-back Pain        SUBJECTIVE: Disclaimer: This note has been generated using voice-recognition software. There may be typographical errors that have been missed during proof-reading      Interval History 11/5/2021:  The patient returns to clinic today for follow up of low back pain. He is s/p right L4/5 and L5/S1 TF [place] on 10/19/2021. He reports limited relief of his pain. He continues to report low back pain that radiates into the lateral aspect of his right leg to under his foot. He does report numbness to the bottom of both feet. His pain is worse with prolonged standing and walking. He also reports difficulty sleeping due to pain. He does feel as though his right leg will give out. His wife reports that he is depressed. He reports episodes of urinary incontinence without the urge to go. He has not seen Urology. He does have an upcoming appointment with his PCP. He denies any other health changes. His pain today is 8/10.    Interval History 6/22/21:  Recently treated for acute cystitis, symptoms resolved.  Continues to use cymbalta 30mg / day gabapentin 600mg TID.  Continues to have substantial lower back pain and right lower extremity radiculopathy.  He has previously been in physical therapy before [facility] and had to discontinue formal physical therapy but was able to continue doing exercise program on his own at home but continued to have substantial lower back pain and radicular symptoms at this time.      Interval history 09/16/2020:  Since previous encounter the patient has been taking gabapentin 1800 mg per day without any side effects and some improvement in his symptoms, and we will previously scheduled to perform a the epidural injection for his lower extremity radicular pain but was canceled secondary to COVID.  Currently he continues to have  bilateral lower extremity radicular pain symptoms worse on the right.  Previous EMG showed mild chronic bilateral lower extremity radicular pains     Interval History 4/7/20:  Follow up for lumbar radicular pain. Patient's scheduled L5-S1 ILESI has been postponed due to covid outbreak.He continues to have lower back pain with bilateral radicular pain to his heels. His gabapentin was increased to 600 mg tid since last visit. He notices slight improvement in pain since increasing gabapentin. He also takes Norco 10 tid prn that is prescribed by his PCP. He is also taking Cymbalta 20 mg qday.     Interval history 03/11/2020:  Since previous encounter the patient is status post right-sided L2-3 and L4-5 transforaminal epidural steroid injections on 02/27/2020 he had about 30% improvement from the previous injection, he has also had some improvement from increasing his gabapentin to 300 mg t.i.d..  He does not have any side effects to this medication.  He continues to have weakness in his right lower extremity continues to have radicular pain symptoms, he is undergoing physical therapy without any worsening of his symptoms or significant improvement.  He needs paperwork filled out for FMLA for his with job.    Initial encounter:    Jhony Matute presents to the clinic for the evaluation of lower back pain. The pain started several eyars ago following no inciting event and symptoms have been unchanged.    Brief history:    Pain Description:    The pain is located in the lumbosacral area and radiates to the BLE to his heels. For several years he has had numbness of his Right lateral thigh. He endorses weakness in his thighs after prolonged sitting and sometimes feels he needs to prop himself up at his desk.     At BEST  8/10     At WORST  10/10 on the WORST day.      On average pain is rated as 10/10.     Today the pain is rated as 9/10    The pain is described as numbing, sharp and shooting      Symptoms interfere with  "daily activity, sleeping and work.     Exacerbating factors: Sitting, Bending and Walking.      Mitigating factors medications.     Patient denies night fever/night sweats, urinary incontinence, bowel incontinence and significant weight loss.  Patient denies any suicidal or homicidal ideations    Pain Medications:  Current:  Gabapentin  Flexeril  Nabumetone  Cymbalta  Norco    Tried in Past:  NSAIDs -nabumetone  TCA -Never  SNRI -Never  Anti-convulsants -gabapentin  Muscle Relaxants -flexeril  Opioids-norco    Physical Therapy/Home Exercise: yes (in  or ) "did not help much"     report:  Reviewed and consistent with medication use as prescribed.    Pain Procedures:   -11/15/19 R L4-5 and L5-S1 TFESI  - 3 other ESIs over past 3 years in [place]  2020-right L2-3 and L4-5 transforaminal epidural steroid    Chiropractor -never  Acupuncture - never  TENS unit -never  Spinal decompression -never  Joint replacement -never    Imagin20 MRI Lumbar   FINDINGS:  Alignment: Alignment is within normal limits.    Vertebrae: Vertebral body heights are well maintained with no evidence of fracture or marrow replacement process.    Discs: Multilevel disc desiccation with maintained disc heights.    Cord: Distal cord is normal in contour and signal intensity.  Conus terminates at L1.    Degenerative findings:    T12-L1: Circumferential disc bulge with left foraminal zone protrusion resulting in mild left neural foraminal narrowing.    L1-L2: Circumferential disc bulge results in mild right neural foraminal narrowing.    L2-L3: Circumferential disc bulge and mild facet arthropathy result in mild right neural foraminal narrowing.    L3-L4: Circumferential disc bulge and mild facet arthropathy noted.  No spinal canal stenosis or neural foraminal narrowing.    L4-L5: Circumferential disc bulge and mild facet arthropathy result in mild bilateral neural foraminal narrowing.    L5-S1: Mild facet arthropathy " noted.  No spinal canal stenosis or neural foraminal narrowing.    Paraspinal muscles & soft tissues: Unremarkable.    Partially visualized abdominal organs are unremarkable.    Sacroiliac joints are unremarkable.  Small subcentimeter T1/T2 hypointense lesion in the right inferior iliac bone, favoring a bone island.      Impression       Mild multilevel degenerative changes of the lumbar spine resulting in mild neural foraminal narrowing.     9/18/19 EMG BLE:  bilateral L4-S1 radiculopathy.      Past Medical History:   Diagnosis Date    Hypertension      Past Surgical History:   Procedure Laterality Date    EPIDURAL STEROID INJECTION N/A 1/26/2022    Procedure: INJECTION, STEROID, EPIDURAL  L4/5 IL [place] NEEDS CONSENT;  Surgeon: Tay Nicholson MD;  [place] [facility], [place], operated by [facility];  Service: Pain Management;  Laterality: N/A;  1/12 RESCHEDULE    TRANSFORAMINAL EPIDURAL INJECTION OF STEROID Right 11/15/2019    Procedure: LUMBAR TRANSFORAMINAL RIGHT L4/5 AND L5/S1 TF [place];  Surgeon: Michael Montes MD;  Location: [facility], [place], operated by [facility];  Service: Pain Management;  Laterality: Right;  NEEDS CONSENT    TRANSFORAMINAL EPIDURAL INJECTION OF STEROID Right 2/27/2020    Procedure: INJECTION, STEROID, EPIDURAL, TRANSFORAMINAL APPROACH, L2-L3 AND L4-L5;  Surgeon: Shon Brunner MD;  Location: [facility], [place], operated by [facility];  Service: Pain Management;  Laterality: Right;    TRANSFORAMINAL EPIDURAL INJECTION OF STEROID Right 10/19/2021    Procedure: Injection,steroid,epidural,transforaminal RIGHT L4/5 and L5/S1;  Surgeon: Shon Brunner MD;  Location: [facility], [place], operated by [facility];  Service: Pain Management;  Laterality: Right;  9/16 will call to r/s house damage  9/15 CB AFTER 9/27     Social History     Socioeconomic History    Marital status:    Tobacco Use    Smoking status: Never Smoker    Smokeless tobacco: Never Used   Substance and Sexual Activity    Alcohol use: No     Comment: socially    Drug use: No    Sexual activity: Yes     Partners:  Female     Family History   Problem Relation Age of Onset    Stroke Mother     Stroke Father     Heart attack Father        Review of patient's allergies indicates:  No Known Allergies    Current Outpatient Medications   Medication Sig    cyclobenzaprine (FLEXERIL) 10 MG tablet     dextroamphetamine-amphetamine (ADDERALL) 15 mg tablet     DULoxetine (CYMBALTA) 20 MG capsule     gabapentin (NEURONTIN) 600 MG tablet Take 2 tablets (1,200 mg total) by mouth 3 (three) times daily.    HYDROcodone-acetaminophen (NORCO)  mg per tablet     icosapent ethyL (VASCEPA) 1 gram Cap Take 4 capsules by mouth once daily.    nabumetone (RELAFEN) 500 MG tablet Take 500 mg by mouth 2 (two) times daily.    omega-3 acid ethyl esters (LOVAZA) 1 gram capsule     rosuvastatin (CRESTOR) 10 MG tablet Take 10 mg by mouth once daily.     No current facility-administered medications for this visit.     Facility-Administered Medications Ordered in Other Visits   Medication    0.9%  NaCl infusion       REVIEW OF SYSTEMS:    GENERAL:  No weight loss, malaise or fevers.  HEENT:   No recent changes in vision or hearing  NECK:  Negative for lumps, no difficulty with swallowing.  RESPIRATORY:  Negative for cough, wheezing or shortness of breath, patient denies any recent URI.  CARDIOVASCULAR:  Negative for chest pain, leg swelling or palpitations.  GI:  Negative for abdominal discomfort, blood in stools or black stools or change in bowel habits.  MUSCULOSKELETAL:  See HPI.  SKIN:  Negative for lesions, rash, and itching.  PSYCH:  No mood disorder or recent psychosocial stressors.  Sleep disorder  HEMATOLOGY/LYMPHOLOGY:  Negative for prolonged bleeding, bruising easily or swollen nodes.  Patient is not currently taking any anti-coagulants  ENDO: No history of diabetes or thyroid dysfunction. gout  NEURO:   No history of headaches, syncope, paralysis, seizures or tremors.  All other reviewed and negative other than  "HPI.    OBJECTIVE:    /80   Pulse 66   Temp 97.5 °F (36.4 °C) (Oral)   Resp 18   Ht 6' 1" (1.854 m)   Wt 114.8 kg (253 lb)   SpO2 100%   BMI 33.38 kg/m²       PHYSICAL EXAMINATION:    GENERAL: Well appearing, in no acute distress, alert and oriented x3.  PSYCH:  Mood and affect appropriate.  SKIN: Skin color, texture, turgor normal, no rashes or lesions.  HEAD/FACE:  Normocephalic, atraumatic. Cranial nerves grossly intact.  CV: RRR with palpation of the radial artery.  PULM: No evidence of respiratory difficulty, symmetric chest rise.  BACK: Straight leg raise in the sitting position is positive for radicular pain bilaterally. There is pain with palpation over lumbar facet joints bilaterally. Limited ROM with pain on flexion and extension. Positive facet loading bilaterally.   EXTREMITIES: No deformities, edema, or skin discoloration. Good capillary refill.  MUSCULOSKELETAL:  There is mild pain with palpation over the sacroiliac joints bilaterally.  FABERs test is negative bilaterally.  FADIRs test is negative. 4/5 strength in right ankle with plantar and dorsiflexion. 5/5 strength in left ankle with plantar and dorsiflexion. 5/5 strength with right knee flexion and extension. 5/5 strength with left knee flexion and extension. No atrophy or tone abnormalities are noted.  NEURO: Bilateral lower extremity coordination and muscle stretch reflexes are physiologic and symmetric.  Plantar response are downgoing. No clonus.   GAIT: Antalgic- ambulates without assistance.       ASSESSMENT: 51 y.o. year old male with pain, consistent with     No diagnosis found.    PLAN:     - Previous imaging was reviewed and discussed with the patient today.    - Schedule for L5/S1 IL [place].     - If limited relief, consider new imaging.     - I have stressed the importance of physical activity and a home exercise plan to help with pain and improve health.    - Continue Flexeril, Nabumetone, and Cymbalta.     - Continue " Gabapentin, refill provided.     - RTC 2 weeks after above procedure.     - Dr. Brunner was consulted on the patient and agrees with this plan.      The above plan and management options were discussed at length with patient. Patient is in agreement with the above and verbalized understanding.     Irina Echeverria  02/24/2022          37

## 2023-10-27 ENCOUNTER — PATIENT MESSAGE (OUTPATIENT)
Dept: PAIN MEDICINE | Facility: CLINIC | Age: 53
End: 2023-10-27
Payer: MEDICARE

## 2023-10-27 ENCOUNTER — TELEPHONE (OUTPATIENT)
Dept: PAIN MEDICINE | Facility: CLINIC | Age: 53
End: 2023-10-27
Payer: MEDICARE

## 2023-11-02 ENCOUNTER — TELEPHONE (OUTPATIENT)
Dept: PAIN MEDICINE | Facility: CLINIC | Age: 53
End: 2023-11-02
Payer: MEDICARE

## 2023-11-02 NOTE — TELEPHONE ENCOUNTER
Staff tried to contact pt and lvm informing pt that his appointment was moved to 2:45 and that Dr. Nicholson will be on the 4th floor.

## 2023-11-02 NOTE — TELEPHONE ENCOUNTER
----- Message from Melinda Green sent at 11/1/2023  3:12 PM CDT -----  Contact: RENÉ RED [[id number]]  Type: Call Back      Who called: RENÉ RED [[id number]]      What is the request in detail: Patient is requesting a call back. Pt would like to see if there is a later appointment time on 11/06.   Please advise.     Can the clinic reply by [facility]? No      Would the patient rather a call back or a response via [facility]? Call back       Best call back number: [phone] (home)       Additional Information:

## 2023-11-06 ENCOUNTER — OFFICE VISIT (OUTPATIENT)
Dept: SPINE | Facility: CLINIC | Age: 53
End: 2023-11-06
Attending: ANESTHESIOLOGY
Payer: MEDICARE

## 2023-11-06 VITALS
DIASTOLIC BLOOD PRESSURE: 88 MMHG | WEIGHT: 250 LBS | RESPIRATION RATE: 12 BRPM | HEART RATE: 68 BPM | SYSTOLIC BLOOD PRESSURE: 168 MMHG | HEIGHT: 72 IN | BODY MASS INDEX: 33.86 KG/M2

## 2023-11-06 DIAGNOSIS — M54.17 LUMBOSACRAL RADICULOPATHY: ICD-10-CM

## 2023-11-06 DIAGNOSIS — M51.36 DDD (DEGENERATIVE DISC DISEASE), LUMBAR: Primary | ICD-10-CM

## 2023-11-06 DIAGNOSIS — M47.816 LUMBAR SPONDYLOSIS: ICD-10-CM

## 2023-11-06 DIAGNOSIS — M47.896 OTHER OSTEOARTHRITIS OF SPINE, LUMBAR REGION: ICD-10-CM

## 2023-11-06 PROCEDURE — 99999 PR PBB SHADOW E&M-EST. PATIENT-LVL III: CPT | Mod: PBBFAC,,, | Performed by: ANESTHESIOLOGY

## 2023-11-06 PROCEDURE — 99999 PR PBB SHADOW E&M-EST. PATIENT-LVL III: ICD-10-PCS | Mod: PBBFAC,,, | Performed by: ANESTHESIOLOGY

## 2023-11-06 PROCEDURE — 99214 PR OFFICE/OUTPT VISIT, EST, LEVL IV, 30-39 MIN: ICD-10-PCS | Mod: S$PBB,,, | Performed by: ANESTHESIOLOGY

## 2023-11-06 PROCEDURE — 99214 OFFICE O/P EST MOD 30 MIN: CPT | Mod: S$PBB,,, | Performed by: ANESTHESIOLOGY

## 2023-11-06 PROCEDURE — 99213 OFFICE O/P EST LOW 20 MIN: CPT | Mod: PBBFAC | Performed by: ANESTHESIOLOGY

## 2023-11-06 NOTE — PROGRESS NOTES
Chronic patient Established Note (Follow up visit)      SUBJECTIVE:    Interval History 11/6/2023:  Jhony Matute presents to the clinic for a follow-up appointment for back pain.Continues to endorse relief s/p LRFA. Patient now notes that he has return of radicular leg pain bilaterally (R>L). Since the last visit, Jhony Matute states the pain has been worsening. Patient endorses cramp aching soreness of his low back associated with sharp, stabbing, shooting, burning pain down the posterolateral aspect of his bilateral legs down to his feet (R>L). Also associated numbness in the L4 dermatome bilaterally.  Pain is exacerbated by sitting or standing for extended periods of time, lifting, bending, activity. Pain is improved with rest, laying flat, heat, ice, medications. Current medications include tylenol with codeine, gabapentin 600mg TID, Celebrex 100mg BID, flexeril 10mg TID PRN. Current pain intensity is 9/10. He notes new bladder incontinence with numbness and tingling of his groin that began in May of this year that is persistent. Patient denies red flags including weakness or major unexpected weight loss/gain. Subjective night sweats/fevers.     Interval History 10/17/2023:  The patient returns to clinic today for follow up of back pain via virtual visit. He is s/p left L3,4,5 RFA on 6/28/2023 and right L3,4,5 RFA on 7/12/2023. He reports 50% relief of his pain. He continues to report low back pain. He reports intermittent radiating pain into his legs. His pain is worse with activity. He is taking Celebrex, Flexeril, and Gabapentin. He does need refills. He is taking Norco from an outside provider. He is interested in keeping all pain management care with our office. He denies any other health changes.      Interval History 3/8/2023:  The patient returns to clinic today for follow up of low back pain via virtual visit. He is s/p bilateral L3,4,5 MBB on 3/1/2023. He reports 80% relief of his low back pain for 3  days. His pain has returned to baseline. He continues to report low back pain, throbbing in nature. His pain is worse with bending, twisting, standing and walking. He does endorse morning stiffness. He continues to take Celebrex, Flexeril, and Gabapentin. He denies any other health changes.      Interval History 1/24/2023:  The patient returns to clinic today for follow up of low back pain. He has not been seen in over 6 months. He was previously scheduled for MBB but this was not done. He continues to report low back pain with intermittent radiating pain into the lateral aspect of both legs to the his heels. He describes this pain as throbbing in nature. His back pain is greater than his leg pain. His pain is worse with bending, twisting, standing, and walking. He does endorse morning stiffness. He tried physical therapy with limited relief. He has had limited relief with ESIs. He is currently taking Gabapentin and Celebrex with benefit. Of note, his wife mentions that he is having episodes of urinary incontinence. He does not feel the urge to go. He denies any weakness. He denies any other health changes. His pain today is 10/10.     Interval History 6/27/2022:  The patient returns to clinic today for follow up of low back pain. He continues to report low back pain that radiates into the radiates into the lateral aspect of his right leg to his knee. He describes this pain as numb in nature. He reports intermittent radiating pain into the left leg in the same pattern. His back pain is greater than leg pain. His pain is worse with standing and walking. He is currently taking Gabapentin and Celebrex with some benefit. He did see Dr. Villarreal in Neurosurgery who recommended PT and EMG. These are scheduled. He denies any other health changes. His pain today is 9/10.     Interval History 4/18/2022:   Patient returns to clinic today for follow-up of chronic low back pain.  He has had multiple [place] with little improvement.   In addition to his back pain, he reports numbness in the lateral aspect of his right thigh with decreased sensation throughout his right lower extremity.  He is currently taking Norco, gabapentin, and Flexeril with no reported side effects.  He also reports episodes of bladder incontinence over the past 1-2 years.  He denies any saddle anesthesia or bowel incontinence.     Interval History 2/24/2022:  The patient returns to clinic today for follow up of low back pain. He is s/p L4/5 IL [place] on 1/26/2022. He reports no relief, in fact his pain worsened after the procedure. He continues to report low back pain that radiates into the lateral aspect of both legs to his heels. He describes this pain as shocking in nature. His pain is worse with prolonged standing and walking. He is frustrated with his continued pain. He continues to take Gabapentin, Cymbalta, and Nabumetone. He has completed physical therapy in the past without relief. He denies any other health changes. His pain today is 10/10.     Interval History 11/5/2021:  The patient returns to clinic today for follow up of low back pain. He is s/p right L4/5 and L5/S1 TF [place] on 10/19/2021. He reports limited relief of his pain. He continues to report low back pain that radiates into the lateral aspect of his right leg to under his foot. He does report numbness to the bottom of both feet. His pain is worse with prolonged standing and walking. He also reports difficulty sleeping due to pain. He does feel as though his right leg will give out. His wife reports that he is depressed. He reports episodes of urinary incontinence without the urge to go. He has not seen Urology. He does have an upcoming appointment with his PCP. He denies any other health changes. His pain today is 8/10.     Interval History 6/22/21:  Recently treated for acute cystitis, symptoms resolved.  Continues to use cymbalta 30mg / day gabapentin 600mg TID.  Continues to have substantial lower back  pain and right lower extremity radiculopathy.  He has previously been in physical therapy before COVID and had to discontinue formal physical therapy but was able to continue doing exercise program on his own at home but continued to have substantial lower back pain and radicular symptoms at this time.      Interval history 09/16/2020:  Since previous encounter the patient has been taking gabapentin 1800 mg per day without any side effects and some improvement in his symptoms, and we will previously scheduled to perform a the epidural injection for his lower extremity radicular pain but was canceled secondary to COVID.  Currently he continues to have bilateral lower extremity radicular pain symptoms worse on the right.  Previous EMG showed mild chronic bilateral lower extremity radicular pains     Interval History 4/7/20:  Follow up for lumbar radicular pain. Patient's scheduled L5-S1 ILESI has been postponed due to covid outbreak.He continues to have lower back pain with bilateral radicular pain to his heels. His gabapentin was increased to 600 mg tid since last visit. He notices slight improvement in pain since increasing gabapentin. He also takes Norco 10 tid prn that is prescribed by his PCP. He is also taking Cymbalta 20 mg qday.      Interval history 03/11/2020:  Since previous encounter the patient is status post right-sided L2-3 and L4-5 transforaminal epidural steroid injections on 02/27/2020 he had about 30% improvement from the previous injection, he has also had some improvement from increasing his gabapentin to 300 mg t.i.d..  He does not have any side effects to this medication.  He continues to have weakness in his right lower extremity continues to have radicular pain symptoms, he is undergoing physical therapy without any worsening of his symptoms or significant improvement.  He needs paperwork filled out for FMLA for his with job.     Initial encounter:     Jhony LINDA Matute presents to the clinic for  "the evaluation of lower back pain. The pain started several eyars ago following no inciting event and symptoms have been unchanged.     Brief history:     Pain Description:     The pain is located in the lumbosacral area and radiates to the BLE to his heels. For several years he has had numbness of his Right lateral thigh. He endorses weakness in his thighs after prolonged sitting and sometimes feels he needs to prop himself up at his desk.      At BEST  8/10      At WORST  10/10 on the WORST day.       On average pain is rated as 10/10.      Today the pain is rated as 9/10     The pain is described as numbing, sharp and shooting       Symptoms interfere with daily activity, sleeping and work.      Exacerbating factors: Sitting, Bending and Walking.       Mitigating factors medications.      Patient denies night fever/night sweats, urinary incontinence, bowel incontinence and significant weight loss.  Patient denies any suicidal or homicidal ideations     Pain Medications:  Current:  Gabapentin  Flexeril  Nabumetone  Cymbalta  Norco     Tried in Past:  NSAIDs -nabumetone  TCA -Never  SNRI -Never  Anti-convulsants -gabapentin  Muscle Relaxants -flexeril  Opioids-norco     Physical Therapy/Home Exercise: yes (in 2015 or 2016) "did not help much"      report:  Reviewed and consistent with medication use as prescribed.     Pain Procedures:   -11/15/19 R L4-5 and L5-S1 TFESI  - 3 other ESIs over past 3 years in [place]  02/27/2020-right L2-3 and L4-5 transforaminal epidural steroid  10/19/2021- Right L4/5 and L5/S1 TF [place]  1/26/2022- L4/5 IL [place]  3/1/2023- Bilateral L3,4,5 MBB  4/19/2023- Bilateral L3,4,5 MBB'  6/28/2023- Left L3,4,5 RFA  7/12/2023- Right L3,4,5 RFA        Imaging:   MRI Lumbar Spine 2/15/2023:  COMPARISON:  11/23/2021     FINDINGS:  Alignment: Normal.     Vertebrae: Normal marrow signal. No fracture.     Discs: Disc heights are maintained.  Left foraminal zone annular fissure noted at L4-L5.   "   Cord: Conus terminates at L1 and appears unremarkable.  Cauda equina appears unremarkable.     Degenerative findings:     T12-L1: No spinal canal stenosis or neural foraminal narrowing.     L1-L2: Circumferential disc bulge.  No spinal canal stenosis or neural foraminal narrowing.     L2-L3: Circumferential disc bulge and mild facet arthropathy result in mild bilateral neural foraminal narrowing.     L3-L4: Circumferential disc bulge and mild facet arthropathy result in mild bilateral neural foraminal narrowing.     L4-L5: Circumferential disc bulge and mild facet arthropathy result in moderate bilateral neural foraminal narrowing.     L5-S1: Circumferential disc bulge and mild facet arthropathy.  No spinal canal stenosis or neural foraminal narrowing.     Paraspinal muscles & soft tissues: Unremarkable.     Impression:     1. Multilevel degenerative changes of the lumbar spine, overall similar to prior study.  Moderate neural foraminal narrowing re-identified at L4-L5     MRI Lumbar Spine 11/23/2021:     COMPARISON:  Lumbar spine MRI 01/17/2020     FINDINGS:  Vertebral alignment is normal.     No compression fractures.  No marrow replacing lesions.     Multilevel degenerative changes with disc desiccation and disc space narrowing, described in detail below.  No significant bone marrow edema.     The conus medullaris has a normal appearance and terminates at the L1 level.  Cauda equina nerve roots are unremarkable.     Paraspinal soft tissues are unremarkable.     SIGNIFICANT FINDINGS BY LEVEL:     T12-L1: Mild disc bulge, eccentric to the left.  No canal stenosis.  Mild left foraminal stenosis.     L1-2: Disc bulge.  No canal stenosis.  Mild bilateral foraminal stenosis.     L2-3: Disc bulge.  Mild bilateral facet arthropathy.  No canal stenosis.  Mild bilateral foraminal stenosis.     L3-4: Disc bulge.  Mild bilateral facet arthropathy.  No canal stenosis.  Mild-moderate foraminal stenosis bilaterally.     L4-5:  Disc bulge.  Mild bilateral facet arthropathy.  No canal stenosis.  Moderate bilateral foraminal stenosis.     L5-S1: Disc bulge.  Bilateral facet arthropathy.  No canal stenosis.  Mild bilateral foraminal stenosis.     Impression:     Progressive multilevel degenerative changes, most advanced at L4-5 where there is moderate bilateral foraminal stenosis.    Allergies: Review of patient's allergies indicates:  No Known Allergies    Current Medications:   Current Outpatient Medications   Medication Sig Dispense Refill    celecoxib (CELEBREX) 100 MG capsule Take 1 capsule (100 mg total) by mouth 2 (two) times daily. 60 capsule 1    cyclobenzaprine (FLEXERIL) 10 MG tablet Take 1 tablet (10 mg total) by mouth 3 (three) times daily as needed for Muscle spasms. 90 tablet 0    dextroamphetamine-amphetamine (ADDERALL) 15 mg tablet       gabapentin (NEURONTIN) 600 MG tablet Take 2 tablets (1,200 mg total) by mouth 3 (three) times daily. 180 tablet 5    HYDROcodone-acetaminophen (NORCO)  mg per tablet       icosapent ethyL (VASCEPA) 1 gram Cap Take 4 capsules by mouth once daily.      omega-3 acid ethyl esters (LOVAZA) 1 gram capsule       rosuvastatin (CRESTOR) 10 MG tablet Take 10 mg by mouth once daily.       No current facility-administered medications for this visit.     Facility-Administered Medications Ordered in Other Visits   Medication Dose Route Frequency Provider Last Rate Last Admin    0.9%  NaCl infusion   Intravenous Continuous Eugenio Londono MD 50 mL/hr at 01/26/22 1359 50 mL/hr at 01/26/22 1359    0.9%  NaCl infusion   Intravenous Continuous Lyle Wright MD           REVIEW OF SYSTEMS:    GENERAL:  No weight loss, malaise or fevers.  HEENT:  Negative for frequent or significant headaches.  NECK:  Negative for lumps, goiter, pain and significant neck swelling.  RESPIRATORY:  Negative for cough, wheezing or shortness of breath.  CARDIOVASCULAR:  Negative for chest pain, leg swelling or  palpitations.  GI:  Negative for abdominal discomfort, blood in stools or black stools or change in bowel habits.  MUSCULOSKELETAL:  See HPI.  SKIN:  Negative for lesions, rash, and itching.  PSYCH:  +ve for sleep disturbance, mood disorder and recent psychosocial stressors.  HEMATOLOGY/LYMPHOLOGY:  Negative for prolonged bleeding, bruising easily or swollen nodes.  NEURO:   No history of headaches, syncope, paralysis, seizures or tremors.  All other reviewed and negative other than HPI.    Past Medical History:  Past Medical History:   Diagnosis Date    Hypertension        Past Surgical History:  Past Surgical History:   Procedure Laterality Date    EPIDURAL STEROID INJECTION N/A 1/26/2022    Procedure: INJECTION, STEROID, EPIDURAL  L4/5 IL [place] NEEDS CONSENT;  Surgeon: Tay Nicholson MD;  Location: [facility] PA[facility];  Service: Pain Management;  Laterality: N/A;  1/12 RESCHEDULE    INJECTION OF ANESTHETIC AGENT AROUND NERVE Bilateral 3/1/2023    Procedure: BLOCK, NERVE BILATERAL L3,L4,L5 MEDIAL BRANCH;  Surgeon: Tay Nicholson MD;  Location: [facility] PAIN [facility]GT;  Service: Pain Management;  Laterality: Bilateral;    INJECTION OF ANESTHETIC AGENT AROUND NERVE Bilateral 4/19/2023    Procedure: BLOCK, NERVE, BILATERAL L3,L4,L5 NEDIAL BRANCH;  Surgeon: Tay Nicholson MD;  Location: [facility] PAIN T;  Service: Pain Management;  Laterality: Bilateral;    RADIOFREQUENCY ABLATION Left 6/28/2023    Procedure: RADIOFREQUENCY ABLATION LEFT L3,L4,L5;  Surgeon: Tay Nicholson MD;  Location: [facility] PAIN MGT;  Service: Pain Management;  Laterality: Left;    RADIOFREQUENCY ABLATION Right 7/12/2023    Procedure: RADIOFREQUENCY ABLATION RIGHT L3,L4,L5;  Surgeon: Tay Nicholson MD;  Location: [facility] PAIN MGT;  Service: Pain Management;  Laterality: Right;    TRANSFORAMINAL EPIDURAL INJECTION OF STEROID Right 11/15/2019    Procedure: LUMBAR TRANSFORAMINAL RIGHT L4/5 AND L5/S1 TF [place];  Surgeon: Michael Montes MD;  Location: [facility]  PAIN MGT;  Service: Pain Management;  Laterality: Right;  NEEDS CONSENT    TRANSFORAMINAL EPIDURAL INJECTION OF STEROID Right 2/27/2020    Procedure: INJECTION, STEROID, EPIDURAL, TRANSFORAMINAL APPROACH, L2-L3 AND L4-L5;  Surgeon: Shon Brunner MD;  Location: [facility] PAIN MGT;  Service: Pain Management;  Laterality: Right;    TRANSFORAMINAL EPIDURAL INJECTION OF STEROID Right 10/19/2021    Procedure: Injection,steroid,epidural,transforaminal RIGHT L4/5 and L5/S1;  Surgeon: Shon Brunner MD;  Location: [facility] PAIN MGT;  Service: Pain Management;  Laterality: Right;  9/16 will call to r/s house damage  9/15 CB AFTER 9/27       Family History:  Family History   Problem Relation Age of Onset    Stroke Mother     Stroke Father     Heart attack Father        Social History:  Social History     Socioeconomic History    Marital status:    Tobacco Use    Smoking status: Never    Smokeless tobacco: Never   Substance and Sexual Activity    Alcohol use: No     Comment: socially    Drug use: No    Sexual activity: Yes     Partners: Female       OBJECTIVE:    BP (!) 168/88 (BP Location: Right arm, Patient Position: Sitting, BP Method: Large (Automatic))   Pulse 68   Resp 12   Ht 6' (1.829 m)   Wt 113.4 kg (250 lb)   BMI 33.91 kg/m²     PHYSICAL EXAMINATION:    General appearance: Well appearing, in no acute distress, alert and oriented x3.  Psych:  Mood and affect appropriate.  Skin: Skin color, texture, turgor normal, no rashes or lesions, in both upper and lower body.  Head/face:  Atraumatic, normocephalic. No palpable lymph nodes  Neck: No pain to palpation over the cervical paraspinous muscles. Spurling Negative. No pain with neck flexion, extension, or lateral flexion. .  Cor: RRR  Pulm: CTA  GI: Abdomen soft and non-tender.  Back: Straight leg raising in the sitting and supine positions is positive to radicular pain. Pain to palpation over the lumbar spine and lumbar facets. Decreased range of motion with  pain reproduction. Positive facet loading bilaterally  Extremities: Peripheral joint ROM is full and pain free without obvious instability or laxity in all four extremities. No deformities, edema, or skin discoloration. Good capillary refill.  Musculoskeletal: Shoulder, hip, sacroiliac and knee provocative maneuvers are negative. Bilateral upper and lower extremity strength is normal and symmetric.  No atrophy or tone abnormalities are noted.  Neuro: Bilateral upper and lower extremity coordination and muscle stretch reflexes are physiologic and symmetric.  Plantar response are downgoing. No loss of sensation is noted.  Gait: antalgic    ASSESSMENT: 52 y.o. year old male with low back pain, consistent with      1. DDD (degenerative disc disease), lumbar  Ambulatory referral/consult to Neurosurgery    EMG W/ ULTRASOUND AND NERVE CONDUCTION TEST 2 Extremities      2. Lumbosacral radiculopathy        3. Lumbar spondylosis        4. Other osteoarthritis of spine, lumbar region              PLAN:     - I have stressed the importance of physical activity and a home exercise plan to help with pain and improve health.  - Patient can continue with medications for now per his providers since they are providing benefits, using them appropriately, and without side effects. I explained to the patient that I do not recommend long term opioid therapy. Pt counseled about the side effects of long term use of opioids including dependence, tolerance, addiction, respiratory depression, somnelence , immune and endocrine dysfunction.  - Recent MRI lumbar spine without significant findings and no recent trauma to precipitate such event. Symptoms stable at this time.  - Ambulatory referral to Neurosurgery for evaluation of lumbar radiculopathy with persistent numbness in lower extremities  - Order EMG of bilateral lower extremities for further evaluation  - Patient also inquired about pain pump and was given information about external  providers who preform that service  - RTC 8-12 weeks  - Counseled patient regarding the importance of activity modification, constant sleeping habits, and physical therapy.    The above plan and management options were discussed at length with patient. Patient is in agreement with the above and verbalized understanding.    Ed Lee MD   I have personally reviewed the history and exam of this patient and agree with the resident/fellow/NPs note as stated above.    Tay Nicholson MD    11/06/2023

## 2023-11-07 ENCOUNTER — PATIENT MESSAGE (OUTPATIENT)
Dept: NEUROSURGERY | Facility: CLINIC | Age: 53
End: 2023-11-07
Payer: MEDICARE

## 2024-07-24 DIAGNOSIS — M51.36 DDD (DEGENERATIVE DISC DISEASE), LUMBAR: Primary | ICD-10-CM

## 2024-08-05 ENCOUNTER — HOSPITAL ENCOUNTER (OUTPATIENT)
Dept: RADIOLOGY | Facility: HOSPITAL | Age: 54
Discharge: HOME | End: 2024-08-05
Attending: ORTHOPAEDIC SURGERY
Payer: MEDICARE

## 2024-08-05 ENCOUNTER — OFFICE VISIT (OUTPATIENT)
Dept: ORTHOPEDICS | Facility: CLINIC | Age: 54
End: 2024-08-05
Payer: MEDICARE

## 2024-08-05 VITALS — BODY MASS INDEX: 32.86 KG/M2 | WEIGHT: 242.31 LBS

## 2024-08-05 DIAGNOSIS — M51.36 DDD (DEGENERATIVE DISC DISEASE), LUMBAR: ICD-10-CM

## 2024-08-05 DIAGNOSIS — M54.16 LUMBAR RADICULOPATHY, CHRONIC: Primary | ICD-10-CM

## 2024-08-05 PROCEDURE — 99212 OFFICE O/P EST SF 10 MIN: CPT | Mod: PBBFAC,25 | Performed by: ORTHOPAEDIC SURGERY

## 2024-08-05 PROCEDURE — 99999 PR PBB SHADOW E&M-EST. PATIENT-LVL II: CPT | Mod: PBBFAC,,, | Performed by: ORTHOPAEDIC SURGERY

## 2024-08-05 PROCEDURE — 99204 OFFICE O/P NEW MOD 45 MIN: CPT | Mod: S$PBB,,, | Performed by: ORTHOPAEDIC SURGERY

## 2024-08-05 PROCEDURE — 72100 X-RAY EXAM L-S SPINE 2/3 VWS: CPT | Mod: TC

## 2024-08-05 PROCEDURE — 72100 X-RAY EXAM L-S SPINE 2/3 VWS: CPT | Mod: 26,,, | Performed by: RADIOLOGY

## 2024-08-05 RX ORDER — METHYLPREDNISOLONE 4 MG/1
TABLET ORAL
Qty: 1 EACH | Refills: 0 | Status: SHIPPED | OUTPATIENT
Start: 2024-08-05 | End: 2024-08-26

## 2024-08-08 ENCOUNTER — HOSPITAL ENCOUNTER (OUTPATIENT)
Dept: RADIOLOGY | Facility: HOSPITAL | Age: 54
Discharge: HOME | End: 2024-08-08
Attending: ORTHOPAEDIC SURGERY
Payer: MEDICARE

## 2024-08-08 DIAGNOSIS — M54.16 LUMBAR RADICULOPATHY, CHRONIC: ICD-10-CM

## 2024-08-08 PROCEDURE — 72148 MRI LUMBAR SPINE W/O DYE: CPT | Mod: 26,,, | Performed by: STUDENT IN AN ORGANIZED HEALTH CARE EDUCATION/TRAINING PROGRAM

## 2024-08-08 PROCEDURE — 72148 MRI LUMBAR SPINE W/O DYE: CPT | Mod: TC,PN

## 2024-08-09 ENCOUNTER — TELEPHONE (OUTPATIENT)
Dept: ORTHOPEDICS | Facility: CLINIC | Age: 54
End: 2024-08-09
Payer: MEDICARE

## 2024-08-09 ENCOUNTER — TELEPHONE (OUTPATIENT)
Dept: ORTHOPEDICS | Facility: CLINIC | Age: 54
End: 2024-08-09

## 2024-08-09 ENCOUNTER — OFFICE VISIT (OUTPATIENT)
Dept: ORTHOPEDICS | Facility: CLINIC | Age: 54
End: 2024-08-09
Payer: MEDICARE

## 2024-08-09 DIAGNOSIS — M51.36 DDD (DEGENERATIVE DISC DISEASE), LUMBAR: Primary | ICD-10-CM

## 2024-08-29 ENCOUNTER — OFFICE VISIT (OUTPATIENT)
Dept: PAIN MEDICINE | Facility: CLINIC | Age: 54
End: 2024-08-29
Attending: ANESTHESIOLOGY
Payer: MEDICARE

## 2024-08-29 VITALS
SYSTOLIC BLOOD PRESSURE: 139 MMHG | BODY MASS INDEX: 33.13 KG/M2 | WEIGHT: 244.25 LBS | RESPIRATION RATE: 16 BRPM | HEART RATE: 67 BPM | DIASTOLIC BLOOD PRESSURE: 84 MMHG

## 2024-08-29 DIAGNOSIS — M46.1 SACROILIITIS: ICD-10-CM

## 2024-08-29 DIAGNOSIS — M54.16 LUMBAR RADICULOPATHY: ICD-10-CM

## 2024-08-29 DIAGNOSIS — M47.897 OTHER SPONDYLOSIS, LUMBOSACRAL REGION: Primary | ICD-10-CM

## 2024-08-29 DIAGNOSIS — M51.36 DDD (DEGENERATIVE DISC DISEASE), LUMBAR: ICD-10-CM

## 2024-08-29 DIAGNOSIS — M47.816 SPONDYLOSIS OF LUMBAR REGION WITHOUT MYELOPATHY OR RADICULOPATHY: Primary | ICD-10-CM

## 2024-08-29 DIAGNOSIS — G89.4 CHRONIC PAIN SYNDROME: ICD-10-CM

## 2024-08-29 PROCEDURE — 99999 PR PBB SHADOW E&M-EST. PATIENT-LVL III: CPT | Mod: PBBFAC,,, | Performed by: ANESTHESIOLOGY

## 2024-08-29 PROCEDURE — 99213 OFFICE O/P EST LOW 20 MIN: CPT | Mod: PBBFAC | Performed by: ANESTHESIOLOGY

## 2024-08-29 PROCEDURE — 99214 OFFICE O/P EST MOD 30 MIN: CPT | Mod: S$PBB,,, | Performed by: ANESTHESIOLOGY

## 2024-08-29 RX ORDER — CYCLOBENZAPRINE HCL 10 MG
10 TABLET ORAL 3 TIMES DAILY PRN
Qty: 90 TABLET | Refills: 0 | Status: SHIPPED | OUTPATIENT
Start: 2024-08-29

## 2024-08-29 RX ORDER — GABAPENTIN 600 MG/1
1200 TABLET ORAL 3 TIMES DAILY
Qty: 180 TABLET | Refills: 5 | Status: SHIPPED | OUTPATIENT
Start: 2024-08-29

## 2024-08-29 RX ORDER — CELECOXIB 100 MG/1
100 CAPSULE ORAL 2 TIMES DAILY
Qty: 60 CAPSULE | Refills: 1 | Status: SHIPPED | OUTPATIENT
Start: 2024-08-29

## 2024-08-29 NOTE — H&P (VIEW-ONLY)
Chronic Pain - Established Patient Visit    PCP: Kerry, Primary Doctor    REFERRING PHYSICIAN: COLLIN Hdz NP    CHIEF COMPLAINT: low back and leg pain.    Interval History 8/29/2024:  53 year old male returns in follow up. At last visit with us patient was referred to neurosurgery, provided list of providers who implant intrathecal pumps, and we ordered an EMG of BLE. EMG was previously recommended by neurosurgery. SN did not recommend any NS intervention at this time and referred patient back to Pain Management. EMG was not done since last visit. Patient continues to have lower back pain that he describes as sharp, burning and shooting down both of his leg accompanied with numbness along the L4 dermatomes bilaterally. Patient said the radicular symptoms are worse on the left.  He continues to take tylenol with codeine, gabapentin 600mg TID, Celebrex 100mg BID, flexeril 10mg TID PRN which provides alleviation in his pain.  His pain score today is 8/10. Patient said reflecting back onto all his prior procedures, he felt like he got the best help with the RFA which had provided him with over 50% relief for over 6 months . Denied any recent trauma, fever, night sweats, saddle anesthesia or bowel incontinence.     Interval History 11/6/2023:  Jhony Matute presents to the clinic for a follow-up appointment for back pain.Continues to endorse relief s/p LRFA. Patient now notes that he has return of radicular leg pain bilaterally (R>L). Since the last visit, Jhony Matute states the pain has been worsening. Patient endorses cramp aching soreness of his low back associated with sharp, stabbing, shooting, burning pain down the posterolateral aspect of his bilateral legs down to his feet (R>L). Also associated numbness in the L4 dermatome bilaterally.  Pain is exacerbated by sitting or standing for extended periods of time, lifting, bending, activity. Pain is improved with rest, laying flat, heat, ice, medications.  Current medications include tylenol with codeine, gabapentin 600mg TID, Celebrex 100mg BID, flexeril 10mg TID PRN. Current pain intensity is 9/10. He notes new bladder incontinence with numbness and tingling of his groin that began in May of this year that is persistent. Patient denies red flags including weakness or major unexpected weight loss/gain. Subjective night sweats/fevers.     Interval History 10/17/2023:  The patient returns to clinic today for follow up of back pain via virtual visit. He is s/p left L3,4,5 RFA on 6/28/2023 and right L3,4,5 RFA on 7/12/2023. He reports 50% relief of his pain. He continues to report low back pain. He reports intermittent radiating pain into his legs. His pain is worse with activity. He is taking Celebrex, Flexeril, and Gabapentin. He does need refills. He is taking Norco from an outside provider. He is interested in keeping all pain management care with our office. He denies any other health changes.      Interval History 3/8/2023:  The patient returns to clinic today for follow up of low back pain via virtual visit. He is s/p bilateral L3,4,5 MBB on 3/1/2023. He reports 80% relief of his low back pain for 3 days. His pain has returned to baseline. He continues to report low back pain, throbbing in nature. His pain is worse with bending, twisting, standing and walking. He does endorse morning stiffness. He continues to take Celebrex, Flexeril, and Gabapentin. He denies any other health changes.      Interval History 1/24/2023:  The patient returns to clinic today for follow up of low back pain. He has not been seen in over 6 months. He was previously scheduled for MBB but this was not done. He continues to report low back pain with intermittent radiating pain into the lateral aspect of both legs to the his heels. He describes this pain as throbbing in nature. His back pain is greater than his leg pain. His pain is worse with bending, twisting, standing, and walking. He  does endorse morning stiffness. He tried physical therapy with limited relief. He has had limited relief with ESIs. He is currently taking Gabapentin and Celebrex with benefit. Of note, his wife mentions that he is having episodes of urinary incontinence. He does not feel the urge to go. He denies any weakness. He denies any other health changes. His pain today is 10/10.     Interval History 6/27/2022:  The patient returns to clinic today for follow up of low back pain. He continues to report low back pain that radiates into the radiates into the lateral aspect of his right leg to his knee. He describes this pain as numb in nature. He reports intermittent radiating pain into the left leg in the same pattern. His back pain is greater than leg pain. His pain is worse with standing and walking. He is currently taking Gabapentin and Celebrex with some benefit. He did see Dr. Villarreal in Neurosurgery who recommended PT and EMG. These are scheduled. He denies any other health changes. His pain today is 9/10.     Interval History 4/18/2022:   Patient returns to clinic today for follow-up of chronic low back pain.  He has had multiple ALEX[place]RIA with little improvement.  In addition to his back pain, he reports numbness in the lateral aspect of his right thigh with decreased sensation throughout his right lower extremity.  He is currently taking Norco, gabapentin, and Flexeril with no reported side effects.  He also reports episodes of bladder incontinence over the past 1-2 years.  He denies any saddle anesthesia or bowel incontinence.     Interval History 2/24/2022:  The patient returns to clinic today for follow up of low back pain. He is s/p L4/5 IL [place]A on 1/26/2022. He reports no relief, in fact his pain worsened after the procedure. He continues to report low back pain that radiates into the lateral aspect of both legs to his heels. He describes this pain as shocking in nature. His pain is worse with prolonged standing and  walking. He is frustrated with his continued pain. He continues to take Gabapentin, Cymbalta, and Nabumetone. He has completed physical therapy in the past without relief. He denies any other health changes. His pain today is 10/10.     Interval History 11/5/2021:  The patient returns to clinic today for follow up of low back pain. He is s/p right L4/5 and L5/S1 TF [place] on 10/19/2021. He reports limited relief of his pain. He continues to report low back pain that radiates into the lateral aspect of his right leg to under his foot. He does report numbness to the bottom of both feet. His pain is worse with prolonged standing and walking. He also reports difficulty sleeping due to pain. He does feel as though his right leg will give out. His wife reports that he is depressed. He reports episodes of urinary incontinence without the urge to go. He has not seen Urology. He does have an upcoming appointment with his PCP. He denies any other health changes. His pain today is 8/10.     Interval History 6/22/21:  Recently treated for acute cystitis, symptoms resolved.  Continues to use cymbalta 30mg / day gabapentin 600mg TID.  Continues to have substantial lower back pain and right lower extremity radiculopathy.  He has previously been in physical therapy before [facility] and had to discontinue formal physical therapy but was able to continue doing exercise program on his own at home but continued to have substantial lower back pain and radicular symptoms at this time.      Interval history 09/16/2020:  Since previous encounter the patient has been taking gabapentin 1800 mg per day without any side effects and some improvement in his symptoms, and we will previously scheduled to perform a the epidural injection for his lower extremity radicular pain but was canceled secondary to COVID.  Currently he continues to have bilateral lower extremity radicular pain symptoms worse on the right.  Previous EMG showed mild chronic bilateral  lower extremity radicular pains     Interval History 4/7/20:  Follow up for lumbar radicular pain. Patient's scheduled L5-S1 ILESI has been postponed due to covid outbreak.He continues to have lower back pain with bilateral radicular pain to his heels. His gabapentin was increased to 600 mg tid since last visit. He notices slight improvement in pain since increasing gabapentin. He also takes Norco 10 tid prn that is prescribed by his PCP. He is also taking Cymbalta 20 mg qday.      Interval history 03/11/2020:  Since previous encounter the patient is status post right-sided L2-3 and L4-5 transforaminal epidural steroid injections on 02/27/2020 he had about 30% improvement from the previous injection, he has also had some improvement from increasing his gabapentin to 300 mg t.i.d..  He does not have any side effects to this medication.  He continues to have weakness in his right lower extremity continues to have radicular pain symptoms, he is undergoing physical therapy without any worsening of his symptoms or significant improvement.  He needs paperwork filled out for FMLA for his with job.     Initial encounter:     Jhony Matute presents to the clinic for the evaluation of lower back pain. The pain started several eyars ago following no inciting event and symptoms have been unchanged.     Brief history:     Pain Description:  The pain is located in the lumbosacral area and radiates to the BLE to his heels. For several years he has had numbness of his Right lateral thigh. He endorses weakness in his thighs after prolonged sitting and sometimes feels he needs to prop himself up at his desk.         8/29/2024     2:04 PM   Last 3 PDI Scores   Pain Disability Index (PDI) 63     Pain Medications:  Current:  Gabapentin  Flexeril  Nabumetone  Cymbalta  Norco     Tried in Past:  NSAIDs -nabumetone  TCA -Never  SNRI -Never  Anti-convulsants -gabapentin  Muscle Relaxants -flexeril  Opioids-norco     Physical Therapy/Home  "Exercise: yes (in 2015 or 2016) "did not help much"      report:  Reviewed and consistent with medication use as prescribed.     Pain Procedures:   -11/15/19 R L4-5 and L5-S1 TFESI  - 3 other ESIs over past 3 years in [place]  02/27/2020-right L2-3 and L4-5 transforaminal epidural steroid  10/19/2021- Right L4/5 and L5/S1 TF [place]  1/26/2022- L4/5 IL [place]A  3/1/2023- Bilateral L3,4,5 MBB  4/19/2023- Bilateral L3,4,5 MBB'  6/28/2023- Left L3,4,5 RFA  7/12/2023- Right L3,4,5 RFA    IMAGING:    MRI Lumbar Spine 8/8/2024  EXAMINATION:  MRI LUMBAR SPINE WITHOUT CONTRAST     CLINICAL HISTORY:  Lumbar radiculopathy, symptoms persist with conservative treatment; Radiculopathy, lumbar region     TECHNIQUE:  Multiplanar, multisequence MR images were acquired from the thoracolumbar junction to the sacrum without the administration of contrast.     COMPARISON:  X-ray dated 08/05/2024.  MRI dated 02/15/2023     FINDINGS:  There are 5 non-rib-bearing lumbar vertebrae.  Alignment is unremarkable with no significant listhesis.  No acute fracture or compression deformity.  No aggressive focal signal abnormality.  Mild Modic type 1 edema noted at the posterior aspect of the L3 superior endplate.     Conus medullaris terminates at the L1 level.  Conus medullaris is normal in size and signal.     T12-L1: No significant disc pathology.  No significant spinal canal or neural foraminal stenosis.     L1-L2: Trace disc bulge.  No significant spinal canal or neural foraminal stenosis.     L2-L3: Trace disc bulge.  Mild bilateral facet arthropathy.  No significant spinal canal stenosis.  Mild bilateral neural foraminal stenosis.     L3-L4: Trace disc bulge.  Mild bilateral facet arthropathy with ligamentum flavum thickening.  No significant spinal canal stenosis.  Moderate left and mild right neural foraminal stenosis.     L4-L5: Trace disc bulge with left foraminal annular fissure.  Mild bilateral facet arthropathy.  No significant spinal " canal stenosis.  Moderate bilateral neural foraminal stenosis.     L5-S1: Trace disc bulge.  Mild bilateral facet arthropathy.  No significant spinal canal or neural foraminal stenosis.     Paraspinal soft tissues are unremarkable.  Visualized intra-abdominal and pelvic contents are also unremarkable.     Impression:     Multilevel degenerative changes as detailed above including Modic type 1 edema at the posterior aspect of the L3 superior endplate and left foraminal annular fissure at L4-L5.     No significant spinal canal stenosis.  Varying degrees of mild-to-moderate neural foraminal stenosis at L2-L3, L3-L4, and L4-L5 as detailed above.    XR Lumbar Spine 8/5/2024:  EXAMINATION:  XR LUMBAR SPINE AP AND LATERAL     CLINICAL HISTORY:  Other intervertebral disc degeneration, lumbar region     TECHNIQUE:  AP, lateral and spot images were performed of the lumbar spine.     COMPARISON:  N 02/15/2023 MRI of the lumbar spine one     FINDINGS:  Mild DJD.  The lumbosacral disc space is slightly narrowed.  The other disc spaces are well maintained.  No fracture, spondylolisthesis or bone destruction identified     Impression:     See above      Past Medical History:   Diagnosis Date    Hypertension      Past Surgical History:   Procedure Laterality Date    EPIDURAL STEROID INJECTION N/A 1/26/2022    Procedure: INJECTION, STEROID, EPIDURAL  L4/5 IL [place] NEEDS CONSENT;  Surgeon: Tay Nicholson MD;  Location: [facility] PAIN MGT;  Service: Pain Management;  Laterality: N/A;  1/12 RESCHEDULE    INJECTION OF ANESTHETIC AGENT AROUND NERVE Bilateral 3/1/2023    Procedure: BLOCK, NERVE BILATERAL L3,L4,L5 MEDIAL BRANCH;  Surgeon: Tay Nicholson MD;  Location: [facility] PAIN [facility]GT;  Service: Pain Management;  Laterality: Bilateral;    INJECTION OF ANESTHETIC AGENT AROUND NERVE Bilateral 4/19/2023    Procedure: BLOCK, NERVE, BILATERAL L3,L4,L5 NEDIAL BRANCH;  Surgeon: Tay Nicholson MD;  Location: [facility] PAIN MGT;  Service: Pain  Management;  Laterality: Bilateral;    RADIOFREQUENCY ABLATION Left 6/28/2023    Procedure: RADIOFREQUENCY ABLATION LEFT L3,L4,L5;  Surgeon: Tay Nicholson MD;  Location: [facility]IN [facility]GT;  Service: Pain Management;  Laterality: Left;    RADIOFREQUENCY ABLATION Right 7/12/2023    Procedure: RADIOFREQUENCY ABLATION RIGHT L3,L4,L5;  Surgeon: Tay Nicholson MD;  Location: [facility] PAIN [facility]GT;  Service: Pain Management;  Laterality: Right;    TRANSFORAMINAL EPIDURAL INJECTION OF STEROID Right 11/15/2019    Procedure: LUMBAR TRANSFORAMINAL RIGHT L4/5 AND L5/S1 TF [place];  Surgeon: Michael Montes MD;  Location: [facility] PAIN [facility]GT;  Service: Pain Management;  Laterality: Right;  NEEDS CONSENT    TRANSFORAMINAL EPIDURAL INJECTION OF STEROID Right 2/27/2020    Procedure: INJECTION, STEROID, EPIDURAL, TRANSFORAMINAL APPROACH, L2-L3 AND L4-L5;  Surgeon: Shon Brunner MD;  Location: [facility]IN [facility];  Service: Pain Management;  Laterality: Right;    TRANSFORAMINAL EPIDURAL INJECTION OF STEROID Right 10/19/2021    Procedure: Injection,steroid,epidural,transforaminal RIGHT L4/5 and L5/S1;  Surgeon: Shon Brunner MD;  Location: [facility] [place];  Service: Pain Management;  Laterality: Right;  9/16 will call to r/s house damage  9/15 CB AFTER 9/27     Social History     Socioeconomic History    Marital status:    Tobacco Use    Smoking status: Never    Smokeless tobacco: Never   Substance and Sexual Activity    Alcohol use: No     Comment: socially    Drug use: No    Sexual activity: Yes     Partners: Female     Social Determinants of Health     Financial Resource Strain: Medium Risk (5/22/2024)    Received from [facility] and Its Subsidiaries and Affiliates    Overall Financial Resource Strain (CARDIA)     Difficulty of Paying Living Expenses: Somewhat hard   Food Insecurity: No Food Insecurity (5/22/2024)    Received from [facility] and Its  Subsidiaries and Affiliates    Hunger Vital Sign     Worried About Running Out of Food in the Last Year: Never true     Ran Out of Food in the Last Year: Never true   Transportation Needs: No Transportation Needs (5/22/2024)    [facility]    PRAPARE - Transportation     Lack of Transportation (Medical): No     Lack of Transportation (Non-Medical): No   Physical Activity: Inactive (5/22/2024)    [facility]    Exercise Vital Sign     Days of Exercise per Week: 0 days     Minutes of Exercise per Session: 0 min   Stress: Stress Concern Present (5/22/2024)    Received from [facility] and Its [facility] and Affiliates    [employer] - Occupational Stress Questionnaire     Feeling of Stress : To some extent     Family History   Problem Relation Name Age of Onset    Stroke Mother      Stroke Father      Heart attack Father         Review of patient's allergies indicates:  No Known Allergies    Current Outpatient Medications   Medication Sig    celecoxib (CELEBREX) 100 MG capsule Take 1 capsule (100 mg total) by mouth 2 (two) times daily.    cyclobenzaprine (FLEXERIL) 10 MG tablet Take 1 tablet (10 mg total) by mouth 3 (three) times daily as needed for Muscle spasms.    dextroamphetamine-amphetamine (ADDERALL) 15 mg tablet     gabapentin (NEURONTIN) 600 MG tablet Take 2 tablets (1,200 mg total) by mouth 3 (three) times daily.    HYDROcodone-acetaminophen (NORCO)  mg per tablet     icosapent ethyL (VASCEPA) 1 gram Cap Take 4 capsules by mouth once daily.    omega-3 acid ethyl esters (LOVAZA) 1 gram capsule     rosuvastatin (CRESTOR) 10 MG tablet Take 10 mg by mouth once daily.     No current facility-administered medications for this visit.     Facility-Administered  Medications Ordered in Other Visits   Medication    0.9%  NaCl infusion    0.9%  NaCl infusion       OPIOID MANAGEMENT:  MME: Norco   Risk:   : Reviewed today  Naloxone:   Utox:   Violations: None  Contract:     ROS:  GENERAL:  No weight loss, malaise or fevers.  HEENT:  Negative for frequent or significant headaches.  NECK:  Negative for lumps, goiter, pain and significant neck swelling.  RESPIRATORY:  Negative for cough, wheezing or shortness of breath.  CARDIOVASCULAR:  Negative for chest pain, leg swelling or palpitations.  GI:  Negative for abdominal discomfort, blood in stools or black stools or change in bowel habits.  MUSCULOSKELETAL:  See HPI.  SKIN:  Negative for lesions, rash, and itching.  PSYCH:  +ve for sleep disturbance, mood disorder and recent psychosocial stressors.  HEMATOLOGY/LYMPHOLOGY:  Negative for prolonged bleeding, bruising easily or swollen nodes.  NEURO:   No history of headaches, syncope, paralysis, seizures or tremors.  All other reviewed and negative other than HPI.      VITALS:   Vitals:    08/29/24 1405   BP: 139/84   Pulse: 67   Resp: 16   Weight: 110.8 kg (244 lb 4.3 oz)   PainSc:   9   PainLoc: Back       GENERAL: Well appearing, in no acute distress, alert and oriented x3.  PSYCH:  Mood and affect appropriate.  SKIN: Skin color, texture, turgor normal, no rashes or lesions.  HEAD/FACE:  Normocephalic, atraumatic. Cranial nerves grossly intact.  NECK: Normal ROM. Supple. No pain to palpation over the cervical paraspinous muscles. Spurling Negative. No pain with neck flexion, extension, or lateral rotation.   CV: RRR with palpation of the radial artery.  PULM: No evidence of respiratory difficulty, symmetric chest rise.  GI:  Soft and non-distended.  MSK: Straight leg raising is negative to radicular pain bilaterally. Pain to palpation over the facet joints and paraspinal muscles of the lumbar spine. Positive axial loading test bilateral. Positive tenderness over both SIJ with  positive thigh and sacral thrust test, Positive FABERE,Ganselin and Yeoman's test on the both side.negative FADIR  Decreased range of motion of the lumbar spine with pain reproduction.  Peripheral joint ROM is full and pain free without obvious instability or laxity in all four extremities. No obvious deformities, edema, or skin discoloration.  No atrophy or tone abnormalities are noted.   NEURO: Bilateral upper and lower extremity coordination and strength is symmetric. Decreased sensation along the L4 bilaterally. Down going Babinski. No Clonus.  MENTAL STATUS: A x O x 3, good concentration, speech is fluent and goal directed  MOTOR: 5/5 in all muscle groups.   GAIT: Normal. Ambulates unassisted.      ASSESSMENT: 53 y.o. year old with lower back pain, consistent with:    1. Other spondylosis, lumbosacral region  Procedure Order to Pain Management    celecoxib (CELEBREX) 100 MG capsule    cyclobenzaprine (FLEXERIL) 10 MG tablet    gabapentin (NEURONTIN) 600 MG tablet      2. DDD (degenerative disc disease), lumbar  celecoxib (CELEBREX) 100 MG capsule    gabapentin (NEURONTIN) 600 MG tablet      3. Chronic pain syndrome        4. Lumbar radiculopathy  celecoxib (CELEBREX) 100 MG capsule    gabapentin (NEURONTIN) 600 MG tablet      5. Sacroiliitis            PLAN:  - I have stressed the importance of physical activity and a home exercise plan to help with pain and improve health.  - Patient can continue with medications for now since they are providing benefits, using them appropriately, and without side effects.  -  reviewed and appropriate.   - Continue with Celebrex 100 mg 2 times a day.  - Continue with Flexeril 10 mg 3 times a day.  - Continue with gabapentin 800 mg 3 times a day  - Spoke with patient about intercept procedure as a future therapy for Modic type 1 changes seen on his most recent MRI at the posterior aspect of the L3 superior endplate  - Schedule patient for bilateral RFA L3, L4 and L5 for his  axial lower back pain.   - Follow up in the clinic 4weeks after procedure with GIULIANA.  - Counseled patient regarding the importance of activity modification, smoking cessation, alcohol cessation, constant sleeping habits, and physical therapy.      Martin Sands MD   International Pain Medicine Fellow   [facility]    I have personally reviewed the history and exam of this patient and agree with the resident/fellow/NPs note as stated above.    Tay Nicholson MD  8/29/24

## 2024-08-29 NOTE — PROGRESS NOTES
Chronic Pain - Established Patient Visit    PCP: Kerry, Primary Doctor    REFERRING PHYSICIAN: COLLIN Hdz NP    CHIEF COMPLAINT: low back and leg pain.    Interval History 8/29/2024:  53 year old male returns in follow up. At last visit with us patient was referred to neurosurgery, provided list of providers who implant intrathecal pumps, and we ordered an EMG of BLE. EMG was previously recommended by neurosurgery. SN did not recommend any NS intervention at this time and referred patient back to Pain Management. EMG was not done since last visit. Patient continues to have lower back pain that he describes as sharp, burning and shooting down both of his leg accompanied with numbness along the L4 dermatomes bilaterally. Patient said the radicular symptoms are worse on the left.  He continues to take tylenol with codeine, gabapentin 600mg TID, Celebrex 100mg BID, flexeril 10mg TID PRN which provides alleviation in his pain.  His pain score today is 8/10. Patient said reflecting back onto all his prior procedures, he felt like he got the best help with the RFA which had provided him with over 50% relief for over 6 months . Denied any recent trauma, fever, night sweats, saddle anesthesia or bowel incontinence.     Interval History 11/6/2023:  Jhony Matute presents to the clinic for a follow-up appointment for back pain.Continues to endorse relief s/p LRFA. Patient now notes that he has return of radicular leg pain bilaterally (R>L). Since the last visit, Jhony Matute states the pain has been worsening. Patient endorses cramp aching soreness of his low back associated with sharp, stabbing, shooting, burning pain down the posterolateral aspect of his bilateral legs down to his feet (R>L). Also associated numbness in the L4 dermatome bilaterally.  Pain is exacerbated by sitting or standing for extended periods of time, lifting, bending, activity. Pain is improved with rest, laying flat, heat, ice, medications.  Current medications include tylenol with codeine, gabapentin 600mg TID, Celebrex 100mg BID, flexeril 10mg TID PRN. Current pain intensity is 9/10. He notes new bladder incontinence with numbness and tingling of his groin that began in May of this year that is persistent. Patient denies red flags including weakness or major unexpected weight loss/gain. Subjective night sweats/fevers.     Interval History 10/17/2023:  The patient returns to clinic today for follow up of back pain via virtual visit. He is s/p left L3,4,5 RFA on 6/28/2023 and right L3,4,5 RFA on 7/12/2023. He reports 50% relief of his pain. He continues to report low back pain. He reports intermittent radiating pain into his legs. His pain is worse with activity. He is taking Celebrex, Flexeril, and Gabapentin. He does need refills. He is taking Norco from an outside provider. He is interested in keeping all pain management care with our office. He denies any other health changes.      Interval History 3/8/2023:  The patient returns to clinic today for follow up of low back pain via virtual visit. He is s/p bilateral L3,4,5 MBB on 3/1/2023. He reports 80% relief of his low back pain for 3 days. His pain has returned to baseline. He continues to report low back pain, throbbing in nature. His pain is worse with bending, twisting, standing and walking. He does endorse morning stiffness. He continues to take Celebrex, Flexeril, and Gabapentin. He denies any other health changes.      Interval History 1/24/2023:  The patient returns to clinic today for follow up of low back pain. He has not been seen in over 6 months. He was previously scheduled for MBB but this was not done. He continues to report low back pain with intermittent radiating pain into the lateral aspect of both legs to the his heels. He describes this pain as throbbing in nature. His back pain is greater than his leg pain. His pain is worse with bending, twisting, standing, and walking. He  does endorse morning stiffness. He tried physical therapy with limited relief. He has had limited relief with ESIs. He is currently taking Gabapentin and Celebrex with benefit. Of note, his wife mentions that he is having episodes of urinary incontinence. He does not feel the urge to go. He denies any weakness. He denies any other health changes. His pain today is 10/10.     Interval History 6/27/2022:  The patient returns to clinic today for follow up of low back pain. He continues to report low back pain that radiates into the radiates into the lateral aspect of his right leg to his knee. He describes this pain as numb in nature. He reports intermittent radiating pain into the left leg in the same pattern. His back pain is greater than leg pain. His pain is worse with standing and walking. He is currently taking Gabapentin and Celebrex with some benefit. He did see Dr. Villarreal in Neurosurgery who recommended PT and EMG. These are scheduled. He denies any other health changes. His pain today is 9/10.     Interval History 4/18/2022:   Patient returns to clinic today for follow-up of chronic low back pain.  He has had multiple A[place] with little improvement.  In addition to his back pain, he reports numbness in the lateral aspect of his right thigh with decreased sensation throughout his right lower extremity.  He is currently taking Norco, gabapentin, and Flexeril with no reported side effects.  He also reports episodes of bladder incontinence over the past 1-2 years.  He denies any saddle anesthesia or bowel incontinence.     Interval History 2/24/2022:  The patient returns to clinic today for follow up of low back pain. He is s/p L4/5 IL [place] on 1/26/2022. He reports no relief, in fact his pain worsened after the procedure. He continues to report low back pain that radiates into the lateral aspect of both legs to his heels. He describes this pain as shocking in nature. His pain is worse with prolonged standing and  walking. He is frustrated with his continued pain. He continues to take Gabapentin, Cymbalta, and Nabumetone. He has completed physical therapy in the past without relief. He denies any other health changes. His pain today is 10/10.     Interval History 11/5/2021:  The patient returns to clinic today for follow up of low back pain. He is s/p right L4/5 and L5/S1 TF [place] on 10/19/2021. He reports limited relief of his pain. He continues to report low back pain that radiates into the lateral aspect of his right leg to under his foot. He does report numbness to the bottom of both feet. His pain is worse with prolonged standing and walking. He also reports difficulty sleeping due to pain. He does feel as though his right leg will give out. His wife reports that he is depressed. He reports episodes of urinary incontinence without the urge to go. He has not seen Urology. He does have an upcoming appointment with his PCP. He denies any other health changes. His pain today is 8/10.     Interval History 6/22/21:  Recently treated for acute cystitis, symptoms resolved.  Continues to use cymbalta 30mg / day gabapentin 600mg TID.  Continues to have substantial lower back pain and right lower extremity radiculopathy.  He has previously been in physical therapy before [facility] and had to discontinue formal physical therapy but was able to continue doing exercise program on his own at home but continued to have substantial lower back pain and radicular symptoms at this time.      Interval history 09/16/2020:  Since previous encounter the patient has been taking gabapentin 1800 mg per day without any side effects and some improvement in his symptoms, and we will previously scheduled to perform a the epidural injection for his lower extremity radicular pain but was canceled secondary to COVID.  Currently he continues to have bilateral lower extremity radicular pain symptoms worse on the right.  Previous EMG showed mild chronic bilateral  lower extremity radicular pains     Interval History 4/7/20:  Follow up for lumbar radicular pain. Patient's scheduled L5-S1 ILESI has been postponed due to covid outbreak.He continues to have lower back pain with bilateral radicular pain to his heels. His gabapentin was increased to 600 mg tid since last visit. He notices slight improvement in pain since increasing gabapentin. He also takes Norco 10 tid prn that is prescribed by his PCP. He is also taking Cymbalta 20 mg qday.      Interval history 03/11/2020:  Since previous encounter the patient is status post right-sided L2-3 and L4-5 transforaminal epidural steroid injections on 02/27/2020 he had about 30% improvement from the previous injection, he has also had some improvement from increasing his gabapentin to 300 mg t.i.d..  He does not have any side effects to this medication.  He continues to have weakness in his right lower extremity continues to have radicular pain symptoms, he is undergoing physical therapy without any worsening of his symptoms or significant improvement.  He needs paperwork filled out for FMLA for his with job.     Initial encounter:     Jhony Matute presents to the clinic for the evaluation of lower back pain. The pain started several eyars ago following no inciting event and symptoms have been unchanged.     Brief history:     Pain Description:  The pain is located in the lumbosacral area and radiates to the BLE to his heels. For several years he has had numbness of his Right lateral thigh. He endorses weakness in his thighs after prolonged sitting and sometimes feels he needs to prop himself up at his desk.         8/29/2024     2:04 PM   Last 3 PDI Scores   Pain Disability Index (PDI) 63     Pain Medications:  Current:  Gabapentin  Flexeril  Nabumetone  Cymbalta  Norco     Tried in Past:  NSAIDs -nabumetone  TCA -Never  SNRI -Never  Anti-convulsants -gabapentin  Muscle Relaxants -flexeril  Opioids-norco     Physical Therapy/Home  "Exercise: yes (in 2015 or 2016) "did not help much"      report:  Reviewed and consistent with medication use as prescribed.     Pain Procedures:   -11/15/19 R L4-5 and L5-S1 TFESI  - 3 other ESIs over past 3 years in [place]  02/27/2020-right L2-3 and L4-5 transforaminal epidural steroid  10/19/2021- Right L4/5 and L5/S1 TF [place]  1/26/2022- L4/5 IL [place]A  3/1/2023- Bilateral L3,4,5 MBB  4/19/2023- Bilateral L3,4,5 MBB'  6/28/2023- Left L3,4,5 RFA  7/12/2023- Right L3,4,5 RFA    IMAGING:    MRI Lumbar Spine 8/8/2024  EXAMINATION:  MRI LUMBAR SPINE WITHOUT CONTRAST     CLINICAL HISTORY:  Lumbar radiculopathy, symptoms persist with conservative treatment; Radiculopathy, lumbar region     TECHNIQUE:  Multiplanar, multisequence MR images were acquired from the thoracolumbar junction to the sacrum without the administration of contrast.     COMPARISON:  X-ray dated 08/05/2024.  MRI dated 02/15/2023     FINDINGS:  There are 5 non-rib-bearing lumbar vertebrae.  Alignment is unremarkable with no significant listhesis.  No acute fracture or compression deformity.  No aggressive focal signal abnormality.  Mild Modic type 1 edema noted at the posterior aspect of the L3 superior endplate.     Conus medullaris terminates at the L1 level.  Conus medullaris is normal in size and signal.     T12-L1: No significant disc pathology.  No significant spinal canal or neural foraminal stenosis.     L1-L2: Trace disc bulge.  No significant spinal canal or neural foraminal stenosis.     L2-L3: Trace disc bulge.  Mild bilateral facet arthropathy.  No significant spinal canal stenosis.  Mild bilateral neural foraminal stenosis.     L3-L4: Trace disc bulge.  Mild bilateral facet arthropathy with ligamentum flavum thickening.  No significant spinal canal stenosis.  Moderate left and mild right neural foraminal stenosis.     L4-L5: Trace disc bulge with left foraminal annular fissure.  Mild bilateral facet arthropathy.  No significant spinal " canal stenosis.  Moderate bilateral neural foraminal stenosis.     L5-S1: Trace disc bulge.  Mild bilateral facet arthropathy.  No significant spinal canal or neural foraminal stenosis.     Paraspinal soft tissues are unremarkable.  Visualized intra-abdominal and pelvic contents are also unremarkable.     Impression:     Multilevel degenerative changes as detailed above including Modic type 1 edema at the posterior aspect of the L3 superior endplate and left foraminal annular fissure at L4-L5.     No significant spinal canal stenosis.  Varying degrees of mild-to-moderate neural foraminal stenosis at L2-L3, L3-L4, and L4-L5 as detailed above.    XR Lumbar Spine 8/5/2024:  EXAMINATION:  XR LUMBAR SPINE AP AND LATERAL     CLINICAL HISTORY:  Other intervertebral disc degeneration, lumbar region     TECHNIQUE:  AP, lateral and spot images were performed of the lumbar spine.     COMPARISON:  N 02/15/2023 MRI of the lumbar spine one     FINDINGS:  Mild DJD.  The lumbosacral disc space is slightly narrowed.  The other disc spaces are well maintained.  No fracture, spondylolisthesis or bone destruction identified     Impression:     See above      Past Medical History:   Diagnosis Date    Hypertension      Past Surgical History:   Procedure Laterality Date    EPIDURAL STEROID INJECTION N/A 1/26/2022    Procedure: INJECTION, STEROID, EPIDURAL  L4/5 IL [place] NEEDS CONSENT;  Surgeon: Tay Nicholson MD;  Location: [facility]IN [facility];  Service: Pain Management;  Laterality: N/A;  1/12 RESCHEDULE    INJECTION OF ANESTHETIC AGENT AROUND NERVE Bilateral 3/1/2023    Procedure: BLOCK, NERVE BILATERAL L3,L4,L5 MEDIAL BRANCH;  Surgeon: Tay Nicholson MD;  Location: [facility] PAIN [facility]GT;  Service: Pain Management;  Laterality: Bilateral;    INJECTION OF ANESTHETIC AGENT AROUND NERVE Bilateral 4/19/2023    Procedure: BLOCK, NERVE, BILATERAL L3,L4,L5 NEDIAL BRANCH;  Surgeon: Tay Nicholson MD;  Location: [facility] PAIN [facility]GT;  Service: Pain  Management;  Laterality: Bilateral;    RADIOFREQUENCY ABLATION Left 6/28/2023    Procedure: RADIOFREQUENCY ABLATION LEFT L3,L4,L5;  Surgeon: Tay Nicholson MD;  Location: [facility]IN [facility];  Service: Pain Management;  Laterality: Left;    RADIOFREQUENCY ABLATION Right 7/12/2023    Procedure: RADIOFREQUENCY ABLATION RIGHT L3,L4,L5;  Surgeon: Tay Nicholson MD;  Location: [facility] PAIN [facility]GT;  Service: Pain Management;  Laterality: Right;    TRANSFORAMINAL EPIDURAL INJECTION OF STEROID Right 11/15/2019    Procedure: LUMBAR TRANSFORAMINAL RIGHT L4/5 AND L5/S1 TF [place];  Surgeon: Michael Montes MD;  Location: [facility] PAIN [facility]GT;  Service: Pain Management;  Laterality: Right;  NEEDS CONSENT    TRANSFORAMINAL EPIDURAL INJECTION OF STEROID Right 2/27/2020    Procedure: INJECTION, STEROID, EPIDURAL, TRANSFORAMINAL APPROACH, L2-L3 AND L4-L5;  Surgeon: Shon Brunner MD;  Location: [facility];  Service: Pain Management;  Laterality: Right;    TRANSFORAMINAL EPIDURAL INJECTION OF STEROID Right 10/19/2021    Procedure: Injection,steroid,epidural,transforaminal RIGHT L4/5 and L5/S1;  Surgeon: Shon Brunner MD;  Location: [facility]IN [facility];  Service: Pain Management;  Laterality: Right;  9/16 will call to r/s house damage  9/15 CB AFTER 9/27     Social History     Socioeconomic History    Marital status:    Tobacco Use    Smoking status: Never    Smokeless tobacco: Never   Substance and Sexual Activity    Alcohol use: No     Comment: socially    Drug use: No    Sexual activity: Yes     Partners: Female     Social Determinants of Health     Financial Resource Strain: Medium Risk (5/22/2024)    Received from [facility] and Its Subsidiaries and Affiliates    Overall Financial Resource Strain (CARDIA)     Difficulty of Paying Living Expenses: Somewhat hard   Food Insecurity: No Food Insecurity (5/22/2024)    Received from [facility] and Its  Subsidiaries and Affiliates    Hunger Vital Sign     Worried About Running Out of Food in the Last Year: Never true     Ran Out of Food in the Last Year: Never true   Transportation Needs: No Transportation Needs (5/22/2024)    [facility]    PRAPARE - Transportation     Lack of Transportation (Medical): No     Lack of Transportation (Non-Medical): No   Physical Activity: Inactive (5/22/2024)    [facility]    Exercise Vital Sign     Days of Exercise per Week: 0 days     Minutes of Exercise per Session: 0 min   Stress: Stress Concern Present (5/22/2024)    Received from [facility] Its [facility] and [facility]iliates    [place] [place] [employer] - Occupational Stress Questionnaire     Feeling of Stress : To some extent     Family History   Problem Relation Name Age of Onset    Stroke Mother      Stroke Father      Heart attack Father         Review of patient's allergies indicates:  No Known Allergies    Current Outpatient Medications   Medication Sig    celecoxib (CELEBREX) 100 MG capsule Take 1 capsule (100 mg total) by mouth 2 (two) times daily.    cyclobenzaprine (FLEXERIL) 10 MG tablet Take 1 tablet (10 mg total) by mouth 3 (three) times daily as needed for Muscle spasms.    dextroamphetamine-amphetamine (ADDERALL) 15 mg tablet     gabapentin (NEURONTIN) 600 MG tablet Take 2 tablets (1,200 mg total) by mouth 3 (three) times daily.    HYDROcodone-acetaminophen (NORCO)  mg per tablet     icosapent ethyL (VASCEPA) 1 gram Cap Take 4 capsules by mouth once daily.    omega-3 acid ethyl esters (LOVAZA) 1 gram capsule     rosuvastatin (CRESTOR) 10 MG tablet Take 10 mg by mouth once daily.     No current facility-administered medications for this visit.     Facility-Administered  Medications Ordered in Other Visits   Medication    0.9%  NaCl infusion    0.9%  NaCl infusion       OPIOID MANAGEMENT:  MME: Norco   Risk:   : Reviewed today  Naloxone:   Utox:   Violations: None  Contract:     ROS:  GENERAL:  No weight loss, malaise or fevers.  HEENT:  Negative for frequent or significant headaches.  NECK:  Negative for lumps, goiter, pain and significant neck swelling.  RESPIRATORY:  Negative for cough, wheezing or shortness of breath.  CARDIOVASCULAR:  Negative for chest pain, leg swelling or palpitations.  GI:  Negative for abdominal discomfort, blood in stools or black stools or change in bowel habits.  MUSCULOSKELETAL:  See HPI.  SKIN:  Negative for lesions, rash, and itching.  PSYCH:  +ve for sleep disturbance, mood disorder and recent psychosocial stressors.  HEMATOLOGY/LYMPHOLOGY:  Negative for prolonged bleeding, bruising easily or swollen nodes.  NEURO:   No history of headaches, syncope, paralysis, seizures or tremors.  All other reviewed and negative other than HPI.      VITALS:   Vitals:    08/29/24 1405   BP: 139/84   Pulse: 67   Resp: 16   Weight: 110.8 kg (244 lb 4.3 oz)   PainSc:   9   PainLoc: Back       GENERAL: Well appearing, in no acute distress, alert and oriented x3.  PSYCH:  Mood and affect appropriate.  SKIN: Skin color, texture, turgor normal, no rashes or lesions.  HEAD/FACE:  Normocephalic, atraumatic. Cranial nerves grossly intact.  NECK: Normal ROM. Supple. No pain to palpation over the cervical paraspinous muscles. Spurling Negative. No pain with neck flexion, extension, or lateral rotation.   CV: RRR with palpation of the radial artery.  PULM: No evidence of respiratory difficulty, symmetric chest rise.  GI:  Soft and non-distended.  MSK: Straight leg raising is negative to radicular pain bilaterally. Pain to palpation over the facet joints and paraspinal muscles of the lumbar spine. Positive axial loading test bilateral. Positive tenderness over both SIJ with  positive thigh and sacral thrust test, Positive FABERE,Ganselin and Yeoman's test on the both side.negative FADIR  Decreased range of motion of the lumbar spine with pain reproduction.  Peripheral joint ROM is full and pain free without obvious instability or laxity in all four extremities. No obvious deformities, edema, or skin discoloration.  No atrophy or tone abnormalities are noted.   NEURO: Bilateral upper and lower extremity coordination and strength is symmetric. Decreased sensation along the L4 bilaterally. Down going Babinski. No Clonus.  MENTAL STATUS: A x O x 3, good concentration, speech is fluent and goal directed  MOTOR: 5/5 in all muscle groups.   GAIT: Normal. Ambulates unassisted.      ASSESSMENT: 53 y.o. year old with lower back pain, consistent with:    1. Other spondylosis, lumbosacral region  Procedure Order to Pain Management    celecoxib (CELEBREX) 100 MG capsule    cyclobenzaprine (FLEXERIL) 10 MG tablet    gabapentin (NEURONTIN) 600 MG tablet      2. DDD (degenerative disc disease), lumbar  celecoxib (CELEBREX) 100 MG capsule    gabapentin (NEURONTIN) 600 MG tablet      3. Chronic pain syndrome        4. Lumbar radiculopathy  celecoxib (CELEBREX) 100 MG capsule    gabapentin (NEURONTIN) 600 MG tablet      5. Sacroiliitis            PLAN:  - I have stressed the importance of physical activity and a home exercise plan to help with pain and improve health.  - Patient can continue with medications for now since they are providing benefits, using them appropriately, and without side effects.  -  reviewed and appropriate.   - Continue with Celebrex 100 mg 2 times a day.  - Continue with Flexeril 10 mg 3 times a day.  - Continue with gabapentin 800 mg 3 times a day  - Spoke with patient about intercept procedure as a future therapy for Modic type 1 changes seen on his most recent MRI at the posterior aspect of the L3 superior endplate  - Schedule patient for bilateral RFA L3, L4 and L5 for his  axial lower back pain.   - Follow up in the clinic 4weeks after procedure with GIULIANA.  - Counseled patient regarding the importance of activity modification, smoking cessation, alcohol cessation, constant sleeping habits, and physical therapy.      Martin Sands MD   International Pain Medicine Fellow   [facility]    I have personally reviewed the history and exam of this patient and agree with the resident/fellow/NPs note as stated above.    Tay Nicholson MD  8/29/24

## 2024-08-30 ENCOUNTER — PATIENT MESSAGE (OUTPATIENT)
Dept: PAIN MEDICINE | Facility: OTHER | Age: 54
End: 2024-08-30
Payer: MEDICARE

## 2024-09-03 PROBLEM — M46.1 SACROILIITIS: Status: ACTIVE | Noted: 2024-09-03

## 2024-09-03 PROBLEM — M47.897 OTHER SPONDYLOSIS, LUMBOSACRAL REGION: Status: ACTIVE | Noted: 2024-09-03

## 2024-09-10 ENCOUNTER — PATIENT MESSAGE (OUTPATIENT)
Dept: PAIN MEDICINE | Facility: OTHER | Age: 54
End: 2024-09-10
Payer: MEDICARE

## 2024-09-23 ENCOUNTER — PATIENT MESSAGE (OUTPATIENT)
Dept: PAIN MEDICINE | Facility: OTHER | Age: 54
End: 2024-09-23
Payer: MEDICARE

## 2024-09-25 ENCOUNTER — HOSPITAL ENCOUNTER (OUTPATIENT)
Facility: OTHER | Age: 54
Discharge: HOME | End: 2024-09-25
Attending: ANESTHESIOLOGY | Admitting: ANESTHESIOLOGY
Payer: MEDICARE

## 2024-09-25 VITALS
WEIGHT: 235 LBS | TEMPERATURE: 98 F | RESPIRATION RATE: 18 BRPM | HEIGHT: 73 IN | SYSTOLIC BLOOD PRESSURE: 136 MMHG | HEART RATE: 68 BPM | DIASTOLIC BLOOD PRESSURE: 95 MMHG | OXYGEN SATURATION: 97 % | BODY MASS INDEX: 31.14 KG/M2

## 2024-09-25 DIAGNOSIS — M47.897 OTHER SPONDYLOSIS, LUMBOSACRAL REGION: Primary | ICD-10-CM

## 2024-09-25 DIAGNOSIS — G89.29 CHRONIC PAIN: ICD-10-CM

## 2024-09-25 PROCEDURE — 64635 DESTROY LUMB/SAC FACET JNT: CPT | Mod: 50 | Performed by: ANESTHESIOLOGY

## 2024-09-25 PROCEDURE — 25000003 PHARM REV CODE 250: Performed by: STUDENT IN AN ORGANIZED HEALTH CARE EDUCATION/TRAINING PROGRAM

## 2024-09-25 PROCEDURE — 64636 DESTROY L/S FACET JNT ADDL: CPT | Mod: 50,,, | Performed by: ANESTHESIOLOGY

## 2024-09-25 PROCEDURE — 64636 DESTROY L/S FACET JNT ADDL: CPT | Mod: 50 | Performed by: ANESTHESIOLOGY

## 2024-09-25 PROCEDURE — 25000003 PHARM REV CODE 250: Performed by: ANESTHESIOLOGY

## 2024-09-25 PROCEDURE — 63600175 PHARM REV CODE 636 W HCPCS: Performed by: ANESTHESIOLOGY

## 2024-09-25 PROCEDURE — 64635 DESTROY LUMB/SAC FACET JNT: CPT | Mod: 50,,, | Performed by: ANESTHESIOLOGY

## 2024-09-25 PROCEDURE — 99152 MOD SED SAME PHYS/QHP 5/>YRS: CPT | Performed by: ANESTHESIOLOGY

## 2024-09-25 RX ORDER — DEXAMETHASONE SODIUM PHOSPHATE 10 MG/ML
INJECTION INTRAMUSCULAR; INTRAVENOUS
Status: DISCONTINUED | OUTPATIENT
Start: 2024-09-25 | End: 2024-09-25 | Stop reason: HOSPADM

## 2024-09-25 RX ORDER — SODIUM CHLORIDE 9 MG/ML
INJECTION, SOLUTION INTRAVENOUS CONTINUOUS
Status: DISCONTINUED | OUTPATIENT
Start: 2024-09-25 | End: 2024-09-25 | Stop reason: HOSPADM

## 2024-09-25 RX ORDER — LIDOCAINE HYDROCHLORIDE 20 MG/ML
INJECTION, SOLUTION INFILTRATION; PERINEURAL
Status: DISCONTINUED | OUTPATIENT
Start: 2024-09-25 | End: 2024-09-25 | Stop reason: HOSPADM

## 2024-09-25 RX ORDER — FENTANYL CITRATE 50 UG/ML
INJECTION, SOLUTION INTRAMUSCULAR; INTRAVENOUS
Status: DISCONTINUED | OUTPATIENT
Start: 2024-09-25 | End: 2024-09-25 | Stop reason: HOSPADM

## 2024-09-25 RX ORDER — BUPIVACAINE HYDROCHLORIDE 2.5 MG/ML
INJECTION, SOLUTION EPIDURAL; INFILTRATION; INTRACAUDAL
Status: DISCONTINUED | OUTPATIENT
Start: 2024-09-25 | End: 2024-09-25 | Stop reason: HOSPADM

## 2024-09-25 RX ORDER — MIDAZOLAM HYDROCHLORIDE 1 MG/ML
INJECTION INTRAMUSCULAR; INTRAVENOUS
Status: DISCONTINUED | OUTPATIENT
Start: 2024-09-25 | End: 2024-09-25 | Stop reason: HOSPADM

## 2024-09-25 NOTE — OP NOTE
Therapeutic Lumbar Medial Branch Radiofrequency Ablation under Fluoroscopy     The procedure, risks, benefits, and options were discussed with the patient. There are no contraindications to the procedure. The patent expressed understanding and agreed to the procedure. Informed written consent was obtained prior to the start of the procedure and can be found in the patient's chart.        PATIENT NAME: Jhony Matute   MRN: [id number]     DATE OF PROCEDURE: 09/25/2024     PROCEDURE:  Bilateral L3, L4, and L5 Lumbar Radiofrequency Ablation under Fluoroscopy    PRE-OP DIAGNOSIS: Spondylosis of lumbar region without myelopathy or radiculopathy [M47.816] Lumbar spondylosis [M47.816]    POST-OP DIAGNOSIS: Same    PHYSICIAN: Tay Nicholson MD    ASSISTANTS: MD Maria Isabel Persaudslydia Pain Fellow       MEDICATIONS INJECTED:  Preservative-free Decadron 10mg with 9cc of Bupivicaine 0.25%    LOCAL ANESTHETIC INJECTED:   Xylocaine 2%    SEDATION: Versed 2mg and Fentanyl 100mcg                                                                                                                                                                                     Conscious sedation ordered by M.D. Patient re-evaluation prior to administration of conscious sedation. No changes noted in patient's status from initial evaluation. The patient's vital signs were monitored by RN and patient remained hemodynamically stable throughout the procedure.    Event Time In   Sedation Start 1418   Sedation End 1437       ESTIMATED BLOOD LOSS:  None    COMPLICATIONS:  None     INTERVAL HISTORY: Patient has clinical and imaging findings suggestive of facet mediated pain. Patients has completed 2 previous diagnostic medial branch blocks at specified levels with at least 80% relief for the expected duration of the local anesthetic utilized.    TECHNIQUE: Time-out was performed to identify the patient and procedure to be performed. With the patient laying in  a prone position, the surgical area was prepped and draped in the usual sterile fashion using ChloraPrep and fenestrated drape. The levels were determined under fluoroscopic guidance. Skin anesthesia was achieved by injecting Lidocaine 2% over the injection sites. A 18 gauge 10mm curved active tip needle was introduced to the anatomic local of the medial branch at each of the above levels using AP, lateral and/or contralateral oblique fluoroscopic imaging. Then sensory and motor testing was performed to confirm that the needle tips were in the correct location. After negative aspiration for blood or CSF was confirmed, 1 mL of the lidocaine 2% listed above was injected slowly at each site. This was followed by thermal lesioning at 80 degrees celsius for 90 seconds. That was followed by slowly injecting 1 mL of the medication mixture listed above at each site. The needles were removed and bleeding was nil. A sterile dressing was applied. No specimens collected. The patient tolerated the procedure well and did not have any procedure related motor deficit at the conclusion of the procedure.      The patient was monitored after the procedure in the recovery area. They were given post-procedure and discharge instructions to follow at home. The patient was discharged in a stable condition.    Pablo Sheehan MD    I reviewed and edited the fellow's note. I conducted my own interview and physical examination. I agree with the findings. I was present and supervising all critical portions of the procedure.    Tay Nicholson MD

## 2024-09-25 NOTE — DISCHARGE INSTRUCTIONS

## 2024-09-25 NOTE — DISCHARGE SUMMARY
Discharge Note  Short Stay      SUMMARY     Admit Date: 9/25/2024    Attending Physician: Tay Nicholson MD    Discharge Physician: Tay Nicholson MD      Discharge Date: 9/25/2024 2:17 PM    Procedure(s) (LRB):  RADIOFREQUENCY ABLATION BILATERAL L3, L4, L5 (Bilateral)    Final Diagnosis: Spondylosis of lumbar region without myelopathy or radiculopathy [M47.816]    Disposition: Home or self care    Patient Instructions:   Current Discharge Medication List        CONTINUE these medications which have NOT CHANGED    Details   celecoxib (CELEBREX) 100 MG capsule Take 1 capsule (100 mg total) by mouth 2 (two) times daily.  Qty: 60 capsule, Refills: 1    Associated Diagnoses: DDD (degenerative disc disease), lumbar; Other spondylosis, lumbosacral region; Lumbar radiculopathy      cyclobenzaprine (FLEXERIL) 10 MG tablet Take 1 tablet (10 mg total) by mouth 3 (three) times daily as needed for Muscle spasms.  Qty: 90 tablet, Refills: 0    Associated Diagnoses: Other spondylosis, lumbosacral region      dextroamphetamine-amphetamine (ADDERALL) 15 mg tablet       gabapentin (NEURONTIN) 600 MG tablet Take 2 tablets (1,200 mg total) by mouth 3 (three) times daily.  Qty: 180 tablet, Refills: 5    Associated Diagnoses: DDD (degenerative disc disease), lumbar; Other spondylosis, lumbosacral region; Lumbar radiculopathy      HYDROcodone-acetaminophen (NORCO)  mg per tablet       icosapent ethyL (VASCEPA) 1 gram Cap Take 4 capsules by mouth once daily.      omega-3 acid ethyl esters (LOVAZA) 1 gram capsule       rosuvastatin (CRESTOR) 10 MG tablet Take 10 mg by mouth once daily.                 Discharge Diagnosis: Spondylosis of lumbar region without myelopathy or radiculopathy [M47.816]  Condition on Discharge: Stable with no complications to procedure   Diet on Discharge: Same as before.  Activity: as per instruction sheet.  Discharge to: Home with a responsible adult.  Follow up: 2-4 weeks       Please call my  office or pager at [phone] if experienced any weakness or loss of sensation, fever > 101.5, pain uncontrolled with oral medications, persistent nausea/vomiting/or diarrhea, redness or drainage from the incisions, or any other worrisome concerns. If physician on call was not reached or could not communicate with our office for any reason please go to the nearest emergency department     Pablo Sheehan M.D.  PGY-5  Interventional Pain Management Fellow  [facility]  Pager: [phone]

## (undated) DEVICE — BANDAGE ADHESIVE

## (undated) DEVICE — DRESSING LEUKOPLAST FLEX 1X3IN